# Patient Record
Sex: FEMALE | Race: WHITE | NOT HISPANIC OR LATINO | Employment: OTHER | URBAN - METROPOLITAN AREA
[De-identification: names, ages, dates, MRNs, and addresses within clinical notes are randomized per-mention and may not be internally consistent; named-entity substitution may affect disease eponyms.]

---

## 2020-07-13 ENCOUNTER — OFFICE VISIT CONVERTED (OUTPATIENT)
Dept: INTERNAL MEDICINE | Facility: CLINIC | Age: 60
End: 2020-07-13
Attending: INTERNAL MEDICINE

## 2020-07-13 ENCOUNTER — HOSPITAL ENCOUNTER (OUTPATIENT)
Dept: OTHER | Facility: HOSPITAL | Age: 60
Discharge: HOME | End: 2020-07-13
Attending: INTERNAL MEDICINE

## 2020-07-13 LAB
ALBUMIN SERPL-MCNC: 4.5 G/DL (ref 3.5–5)
ALBUMIN/GLOB SERPL: 1.5 {RATIO} (ref 1.4–2.6)
ALP SERPL-CCNC: 96 U/L (ref 43–160)
ALT SERPL-CCNC: 27 U/L (ref 10–40)
ANION GAP SERPL CALC-SCNC: 15 MMOL/L (ref 8–19)
AST SERPL-CCNC: 21 U/L (ref 15–50)
BASOPHILS # BLD AUTO: 0.05 10*3/UL (ref 0–0.2)
BASOPHILS NFR BLD AUTO: 0.5 % (ref 0–3)
BILIRUB SERPL-MCNC: 0.28 MG/DL (ref 0.2–1.3)
BUN SERPL-MCNC: 24 MG/DL (ref 5–25)
BUN/CREAT SERPL: 26 {RATIO} (ref 6–20)
CALCIUM SERPL-MCNC: 9.9 MG/DL (ref 8.7–10.4)
CHLORIDE SERPL-SCNC: 104 MMOL/L (ref 99–111)
CHOLEST SERPL-MCNC: 228 MG/DL (ref 107–200)
CHOLEST/HDLC SERPL: 5 {RATIO} (ref 3–6)
CONV ABS IMM GRAN: 0.03 10*3/UL (ref 0–0.2)
CONV CO2: 26 MMOL/L (ref 22–32)
CONV CREATININE URINE, RANDOM: 111 MG/DL (ref 10–300)
CONV IMMATURE GRAN: 0.3 % (ref 0–1.8)
CONV MICROALBUM.,U,RANDOM: <12 MG/L (ref 0–20)
CONV TOTAL PROTEIN: 7.5 G/DL (ref 6.3–8.2)
CREAT UR-MCNC: 0.94 MG/DL (ref 0.5–0.9)
DEPRECATED RDW RBC AUTO: 47.4 FL (ref 36.4–46.3)
EOSINOPHIL # BLD AUTO: 0.28 10*3/UL (ref 0–0.7)
EOSINOPHIL # BLD AUTO: 2.6 % (ref 0–7)
ERYTHROCYTE [DISTWIDTH] IN BLOOD BY AUTOMATED COUNT: 13.2 % (ref 11.7–14.4)
EST. AVERAGE GLUCOSE BLD GHB EST-MCNC: 140 MG/DL
GFR SERPLBLD BASED ON 1.73 SQ M-ARVRAT: >60 ML/MIN/{1.73_M2}
GLOBULIN UR ELPH-MCNC: 3 G/DL (ref 2–3.5)
GLUCOSE SERPL-MCNC: 129 MG/DL (ref 65–99)
HBA1C MFR BLD: 6.5 % (ref 3.5–5.7)
HCT VFR BLD AUTO: 46.4 % (ref 37–47)
HDLC SERPL-MCNC: 46 MG/DL (ref 40–60)
HGB BLD-MCNC: 14.9 G/DL (ref 12–16)
LDLC SERPL CALC-MCNC: 127 MG/DL (ref 70–100)
LYMPHOCYTES # BLD AUTO: 3.98 10*3/UL (ref 1–5)
LYMPHOCYTES NFR BLD AUTO: 37.1 % (ref 20–45)
MCH RBC QN AUTO: 31.2 PG (ref 27–31)
MCHC RBC AUTO-ENTMCNC: 32.1 G/DL (ref 33–37)
MCV RBC AUTO: 97.3 FL (ref 81–99)
MICROALBUMIN/CREAT UR: 10.8 MG/G{CRE} (ref 0–35)
MONOCYTES # BLD AUTO: 0.6 10*3/UL (ref 0.2–1.2)
MONOCYTES NFR BLD AUTO: 5.6 % (ref 3–10)
NEUTROPHILS # BLD AUTO: 5.79 10*3/UL (ref 2–8)
NEUTROPHILS NFR BLD AUTO: 53.9 % (ref 30–85)
NRBC CBCN: 0 % (ref 0–0.7)
OSMOLALITY SERPL CALC.SUM OF ELEC: 296 MOSM/KG (ref 273–304)
PLATELET # BLD AUTO: 297 10*3/UL (ref 130–400)
PMV BLD AUTO: 10.6 FL (ref 9.4–12.3)
POTASSIUM SERPL-SCNC: 5 MMOL/L (ref 3.5–5.3)
RBC # BLD AUTO: 4.77 10*6/UL (ref 4.2–5.4)
SODIUM SERPL-SCNC: 140 MMOL/L (ref 135–147)
TRIGL SERPL-MCNC: 276 MG/DL (ref 40–150)
TSH SERPL-ACNC: 1.47 M[IU]/L (ref 0.27–4.2)
VLDLC SERPL-MCNC: 55 MG/DL (ref 5–37)
WBC # BLD AUTO: 10.73 10*3/UL (ref 4.8–10.8)

## 2020-07-27 ENCOUNTER — OFFICE VISIT CONVERTED (OUTPATIENT)
Dept: INTERNAL MEDICINE | Facility: CLINIC | Age: 60
End: 2020-07-27
Attending: INTERNAL MEDICINE

## 2020-07-29 ENCOUNTER — OFFICE VISIT CONVERTED (OUTPATIENT)
Dept: INTERNAL MEDICINE | Facility: CLINIC | Age: 60
End: 2020-07-29
Attending: INTERNAL MEDICINE

## 2020-08-03 ENCOUNTER — OFFICE VISIT CONVERTED (OUTPATIENT)
Dept: INTERNAL MEDICINE | Facility: CLINIC | Age: 60
End: 2020-08-03
Attending: INTERNAL MEDICINE

## 2020-10-14 ENCOUNTER — OFFICE VISIT CONVERTED (OUTPATIENT)
Dept: INTERNAL MEDICINE | Facility: CLINIC | Age: 60
End: 2020-10-14
Attending: PHYSICIAN ASSISTANT

## 2020-10-15 ENCOUNTER — HOSPITAL ENCOUNTER (OUTPATIENT)
Dept: OTHER | Facility: HOSPITAL | Age: 60
Discharge: HOME | End: 2020-10-15
Attending: PHYSICIAN ASSISTANT

## 2020-10-15 LAB
ALBUMIN SERPL-MCNC: 4.3 G/DL (ref 3.5–5)
ALBUMIN/GLOB SERPL: 1.8 {RATIO} (ref 1.4–2.6)
ALP SERPL-CCNC: 88 U/L (ref 43–160)
ALT SERPL-CCNC: 35 U/L (ref 10–40)
ANION GAP SERPL CALC-SCNC: 19 MMOL/L (ref 8–19)
AST SERPL-CCNC: 27 U/L (ref 15–50)
BASOPHILS # BLD AUTO: 0.05 10*3/UL (ref 0–0.2)
BASOPHILS NFR BLD AUTO: 0.5 % (ref 0–3)
BILIRUB SERPL-MCNC: 0.31 MG/DL (ref 0.2–1.3)
BUN SERPL-MCNC: 21 MG/DL (ref 5–25)
BUN/CREAT SERPL: 23 {RATIO} (ref 6–20)
CALCIUM SERPL-MCNC: 9.3 MG/DL (ref 8.7–10.4)
CHLORIDE SERPL-SCNC: 105 MMOL/L (ref 99–111)
CHOLEST SERPL-MCNC: 201 MG/DL (ref 107–200)
CHOLEST/HDLC SERPL: 4.7 {RATIO} (ref 3–6)
CONV ABS IMM GRAN: 0.03 10*3/UL (ref 0–0.2)
CONV CO2: 20 MMOL/L (ref 22–32)
CONV IMMATURE GRAN: 0.3 % (ref 0–1.8)
CONV TOTAL PROTEIN: 6.7 G/DL (ref 6.3–8.2)
CREAT UR-MCNC: 0.92 MG/DL (ref 0.5–0.9)
DEPRECATED RDW RBC AUTO: 43.6 FL (ref 36.4–46.3)
EOSINOPHIL # BLD AUTO: 0.23 10*3/UL (ref 0–0.7)
EOSINOPHIL # BLD AUTO: 2.4 % (ref 0–7)
ERYTHROCYTE [DISTWIDTH] IN BLOOD BY AUTOMATED COUNT: 12.5 % (ref 11.7–14.4)
EST. AVERAGE GLUCOSE BLD GHB EST-MCNC: 140 MG/DL
GFR SERPLBLD BASED ON 1.73 SQ M-ARVRAT: >60 ML/MIN/{1.73_M2}
GLOBULIN UR ELPH-MCNC: 2.4 G/DL (ref 2–3.5)
GLUCOSE SERPL-MCNC: 132 MG/DL (ref 65–99)
HBA1C MFR BLD: 6.5 % (ref 3.5–5.7)
HCT VFR BLD AUTO: 44.5 % (ref 37–47)
HDLC SERPL-MCNC: 43 MG/DL (ref 40–60)
HGB BLD-MCNC: 14.9 G/DL (ref 12–16)
LDLC SERPL CALC-MCNC: 115 MG/DL (ref 70–100)
LYMPHOCYTES # BLD AUTO: 3.25 10*3/UL (ref 1–5)
LYMPHOCYTES NFR BLD AUTO: 33.5 % (ref 20–45)
MCH RBC QN AUTO: 31.4 PG (ref 27–31)
MCHC RBC AUTO-ENTMCNC: 33.5 G/DL (ref 33–37)
MCV RBC AUTO: 93.9 FL (ref 81–99)
MONOCYTES # BLD AUTO: 0.59 10*3/UL (ref 0.2–1.2)
MONOCYTES NFR BLD AUTO: 6.1 % (ref 3–10)
NEUTROPHILS # BLD AUTO: 5.54 10*3/UL (ref 2–8)
NEUTROPHILS NFR BLD AUTO: 57.2 % (ref 30–85)
NRBC CBCN: 0 % (ref 0–0.7)
OSMOLALITY SERPL CALC.SUM OF ELEC: 293 MOSM/KG (ref 273–304)
PLATELET # BLD AUTO: 264 10*3/UL (ref 130–400)
PMV BLD AUTO: 10.7 FL (ref 9.4–12.3)
POTASSIUM SERPL-SCNC: 4.5 MMOL/L (ref 3.5–5.3)
RBC # BLD AUTO: 4.74 10*6/UL (ref 4.2–5.4)
SODIUM SERPL-SCNC: 139 MMOL/L (ref 135–147)
TRIGL SERPL-MCNC: 216 MG/DL (ref 40–150)
TSH SERPL-ACNC: 2.12 M[IU]/L (ref 0.27–4.2)
VLDLC SERPL-MCNC: 43 MG/DL (ref 5–37)
WBC # BLD AUTO: 9.69 10*3/UL (ref 4.8–10.8)

## 2021-01-01 ENCOUNTER — OFFICE VISIT (OUTPATIENT)
Dept: INTERNAL MEDICINE | Facility: CLINIC | Age: 61
End: 2021-01-01

## 2021-01-01 ENCOUNTER — TELEPHONE (OUTPATIENT)
Dept: INTERNAL MEDICINE | Facility: CLINIC | Age: 61
End: 2021-01-01

## 2021-01-01 ENCOUNTER — PROCEDURE VISIT (OUTPATIENT)
Dept: INTERNAL MEDICINE | Facility: CLINIC | Age: 61
End: 2021-01-01

## 2021-01-01 VITALS
BODY MASS INDEX: 35.42 KG/M2 | HEIGHT: 66 IN | SYSTOLIC BLOOD PRESSURE: 108 MMHG | DIASTOLIC BLOOD PRESSURE: 74 MMHG | TEMPERATURE: 97.6 F | WEIGHT: 220.4 LBS | RESPIRATION RATE: 22 BRPM | HEART RATE: 72 BPM | OXYGEN SATURATION: 96 %

## 2021-01-01 VITALS
SYSTOLIC BLOOD PRESSURE: 130 MMHG | TEMPERATURE: 97.4 F | WEIGHT: 216.8 LBS | HEART RATE: 89 BPM | DIASTOLIC BLOOD PRESSURE: 74 MMHG | HEIGHT: 63 IN | OXYGEN SATURATION: 95 % | BODY MASS INDEX: 38.41 KG/M2

## 2021-01-01 DIAGNOSIS — E11.9 TYPE 2 DIABETES MELLITUS WITHOUT COMPLICATION, WITHOUT LONG-TERM CURRENT USE OF INSULIN (HCC): Primary | ICD-10-CM

## 2021-01-01 DIAGNOSIS — L72.9 CYST OF SKIN: Primary | ICD-10-CM

## 2021-01-01 DIAGNOSIS — M54.9 BACK PAIN, UNSPECIFIED BACK LOCATION, UNSPECIFIED BACK PAIN LATERALITY, UNSPECIFIED CHRONICITY: ICD-10-CM

## 2021-01-01 DIAGNOSIS — E78.5 HYPERLIPIDEMIA, UNSPECIFIED HYPERLIPIDEMIA TYPE: ICD-10-CM

## 2021-01-01 DIAGNOSIS — I10 ESSENTIAL HYPERTENSION: ICD-10-CM

## 2021-01-01 LAB
ALBUMIN SERPL-MCNC: 4.5 G/DL (ref 3.5–5.2)
ALBUMIN UR-MCNC: <1.2 MG/DL
ALBUMIN/GLOB SERPL: 1.5 G/DL
ALP SERPL-CCNC: 95 U/L (ref 39–117)
ALT SERPL W P-5'-P-CCNC: 33 U/L (ref 1–33)
ANION GAP SERPL CALCULATED.3IONS-SCNC: 12.2 MMOL/L (ref 5–15)
AST SERPL-CCNC: 36 U/L (ref 1–32)
BASOPHILS # BLD AUTO: 0.05 10*3/MM3 (ref 0–0.2)
BASOPHILS NFR BLD AUTO: 0.4 % (ref 0–1.5)
BILIRUB SERPL-MCNC: 0.4 MG/DL (ref 0–1.2)
BUN SERPL-MCNC: 20 MG/DL (ref 8–23)
BUN/CREAT SERPL: 22 (ref 7–25)
CALCIUM SPEC-SCNC: 9.6 MG/DL (ref 8.6–10.5)
CHLORIDE SERPL-SCNC: 102 MMOL/L (ref 98–107)
CHOLEST SERPL-MCNC: 224 MG/DL (ref 0–200)
CO2 SERPL-SCNC: 24.8 MMOL/L (ref 22–29)
CREAT SERPL-MCNC: 0.91 MG/DL (ref 0.57–1)
CREAT UR-MCNC: 142.9 MG/DL
DEPRECATED RDW RBC AUTO: 42.3 FL (ref 37–54)
EOSINOPHIL # BLD AUTO: 0.24 10*3/MM3 (ref 0–0.4)
EOSINOPHIL NFR BLD AUTO: 2 % (ref 0.3–6.2)
ERYTHROCYTE [DISTWIDTH] IN BLOOD BY AUTOMATED COUNT: 12.1 % (ref 12.3–15.4)
GFR SERPL CREATININE-BSD FRML MDRD: 62 ML/MIN/1.73
GLOBULIN UR ELPH-MCNC: 3 GM/DL
GLUCOSE SERPL-MCNC: 123 MG/DL (ref 65–99)
HBA1C MFR BLD: 7.74 % (ref 4.8–5.6)
HCT VFR BLD AUTO: 44.5 % (ref 34–46.6)
HDLC SERPL-MCNC: 44 MG/DL (ref 40–60)
HGB BLD-MCNC: 14.9 G/DL (ref 12–15.9)
IMM GRANULOCYTES # BLD AUTO: 0.05 10*3/MM3 (ref 0–0.05)
IMM GRANULOCYTES NFR BLD AUTO: 0.4 % (ref 0–0.5)
LDLC SERPL CALC-MCNC: 140 MG/DL (ref 0–100)
LDLC/HDLC SERPL: 3.08 {RATIO}
LYMPHOCYTES # BLD AUTO: 3.8 10*3/MM3 (ref 0.7–3.1)
LYMPHOCYTES NFR BLD AUTO: 31.7 % (ref 19.6–45.3)
MCH RBC QN AUTO: 31.5 PG (ref 26.6–33)
MCHC RBC AUTO-ENTMCNC: 33.5 G/DL (ref 31.5–35.7)
MCV RBC AUTO: 94.1 FL (ref 79–97)
MICROALBUMIN/CREAT UR: NORMAL MG/G{CREAT}
MONOCYTES # BLD AUTO: 0.67 10*3/MM3 (ref 0.1–0.9)
MONOCYTES NFR BLD AUTO: 5.6 % (ref 5–12)
NEUTROPHILS NFR BLD AUTO: 59.9 % (ref 42.7–76)
NEUTROPHILS NFR BLD AUTO: 7.18 10*3/MM3 (ref 1.7–7)
NRBC BLD AUTO-RTO: 0 /100 WBC (ref 0–0.2)
PLATELET # BLD AUTO: 280 10*3/MM3 (ref 140–450)
PMV BLD AUTO: 10.8 FL (ref 6–12)
POTASSIUM SERPL-SCNC: 4.5 MMOL/L (ref 3.5–5.2)
PROT SERPL-MCNC: 7.5 G/DL (ref 6–8.5)
RBC # BLD AUTO: 4.73 10*6/MM3 (ref 3.77–5.28)
SODIUM SERPL-SCNC: 139 MMOL/L (ref 136–145)
TRIGL SERPL-MCNC: 222 MG/DL (ref 0–150)
VLDLC SERPL-MCNC: 40 MG/DL (ref 5–40)
WBC # BLD AUTO: 11.99 10*3/MM3 (ref 3.4–10.8)

## 2021-01-01 PROCEDURE — 82043 UR ALBUMIN QUANTITATIVE: CPT | Performed by: INTERNAL MEDICINE

## 2021-01-01 PROCEDURE — 99214 OFFICE O/P EST MOD 30 MIN: CPT | Performed by: INTERNAL MEDICINE

## 2021-01-01 PROCEDURE — 83036 HEMOGLOBIN GLYCOSYLATED A1C: CPT | Performed by: INTERNAL MEDICINE

## 2021-01-01 PROCEDURE — 82570 ASSAY OF URINE CREATININE: CPT | Performed by: INTERNAL MEDICINE

## 2021-01-01 PROCEDURE — 80053 COMPREHEN METABOLIC PANEL: CPT | Performed by: INTERNAL MEDICINE

## 2021-01-01 PROCEDURE — 85025 COMPLETE CBC W/AUTO DIFF WBC: CPT | Performed by: INTERNAL MEDICINE

## 2021-01-01 PROCEDURE — 80061 LIPID PANEL: CPT | Performed by: INTERNAL MEDICINE

## 2021-01-01 PROCEDURE — 17110 DESTRUCTION B9 LES UP TO 14: CPT | Performed by: INTERNAL MEDICINE

## 2021-01-01 PROCEDURE — 36415 COLL VENOUS BLD VENIPUNCTURE: CPT | Performed by: INTERNAL MEDICINE

## 2021-01-01 RX ORDER — AMLODIPINE BESYLATE 10 MG/1
10 TABLET ORAL DAILY
Qty: 30 TABLET | Refills: 5 | Status: SHIPPED | OUTPATIENT
Start: 2021-01-01 | End: 2022-01-01 | Stop reason: SDUPTHER

## 2021-01-01 RX ORDER — LISINOPRIL 40 MG/1
40 TABLET ORAL DAILY
Qty: 30 TABLET | Refills: 5 | Status: SHIPPED | OUTPATIENT
Start: 2021-01-01 | End: 2022-01-01 | Stop reason: SDUPTHER

## 2021-01-01 RX ORDER — CYCLOBENZAPRINE HCL 10 MG
10 TABLET ORAL 3 TIMES DAILY PRN
Qty: 90 TABLET | Refills: 0 | Status: SHIPPED | OUTPATIENT
Start: 2021-01-01 | End: 2022-01-01 | Stop reason: SDUPTHER

## 2021-01-01 RX ORDER — METHYLPREDNISOLONE ACETATE 80 MG/ML
80 INJECTION, SUSPENSION INTRA-ARTICULAR; INTRALESIONAL; INTRAMUSCULAR; SOFT TISSUE ONCE
Status: DISCONTINUED | OUTPATIENT
Start: 2021-01-01 | End: 2022-01-01

## 2021-01-01 RX ORDER — ATENOLOL 25 MG/1
25 TABLET ORAL DAILY
Qty: 30 TABLET | Refills: 5 | Status: SHIPPED | OUTPATIENT
Start: 2021-01-01 | End: 2022-01-01 | Stop reason: SDUPTHER

## 2021-01-15 ENCOUNTER — OFFICE VISIT CONVERTED (OUTPATIENT)
Dept: INTERNAL MEDICINE | Facility: CLINIC | Age: 61
End: 2021-01-15
Attending: INTERNAL MEDICINE

## 2021-01-15 ENCOUNTER — HOSPITAL ENCOUNTER (OUTPATIENT)
Dept: OTHER | Facility: HOSPITAL | Age: 61
Discharge: HOME | End: 2021-01-15
Attending: INTERNAL MEDICINE

## 2021-01-15 LAB
ALBUMIN SERPL-MCNC: 4.6 G/DL (ref 3.5–5)
ALBUMIN/GLOB SERPL: 1.5 {RATIO} (ref 1.4–2.6)
ALP SERPL-CCNC: 102 U/L (ref 43–160)
ALT SERPL-CCNC: 38 U/L (ref 10–40)
ANION GAP SERPL CALC-SCNC: 18 MMOL/L (ref 8–19)
AST SERPL-CCNC: 29 U/L (ref 15–50)
BASOPHILS # BLD AUTO: 0.07 10*3/UL (ref 0–0.2)
BASOPHILS NFR BLD AUTO: 0.6 % (ref 0–3)
BILIRUB SERPL-MCNC: 0.27 MG/DL (ref 0.2–1.3)
BUN SERPL-MCNC: 20 MG/DL (ref 5–25)
BUN/CREAT SERPL: 21 {RATIO} (ref 6–20)
CALCIUM SERPL-MCNC: 10.4 MG/DL (ref 8.7–10.4)
CHLORIDE SERPL-SCNC: 100 MMOL/L (ref 99–111)
CHOLEST SERPL-MCNC: 242 MG/DL (ref 107–200)
CHOLEST/HDLC SERPL: 5.3 {RATIO} (ref 3–6)
CONV ABS IMM GRAN: 0.05 10*3/UL (ref 0–0.2)
CONV CO2: 23 MMOL/L (ref 22–32)
CONV IMMATURE GRAN: 0.4 % (ref 0–1.8)
CONV TOTAL PROTEIN: 7.7 G/DL (ref 6.3–8.2)
CREAT UR-MCNC: 0.95 MG/DL (ref 0.5–0.9)
DEPRECATED RDW RBC AUTO: 43.6 FL (ref 36.4–46.3)
EOSINOPHIL # BLD AUTO: 0.28 10*3/UL (ref 0–0.7)
EOSINOPHIL # BLD AUTO: 2.3 % (ref 0–7)
ERYTHROCYTE [DISTWIDTH] IN BLOOD BY AUTOMATED COUNT: 12.6 % (ref 11.7–14.4)
EST. AVERAGE GLUCOSE BLD GHB EST-MCNC: 151 MG/DL
GFR SERPLBLD BASED ON 1.73 SQ M-ARVRAT: >60 ML/MIN/{1.73_M2}
GLOBULIN UR ELPH-MCNC: 3.1 G/DL (ref 2–3.5)
GLUCOSE SERPL-MCNC: 105 MG/DL (ref 65–99)
HBA1C MFR BLD: 6.9 % (ref 3.5–5.7)
HCT VFR BLD AUTO: 45.8 % (ref 37–47)
HDLC SERPL-MCNC: 46 MG/DL (ref 40–60)
HGB BLD-MCNC: 15.2 G/DL (ref 12–16)
LDLC SERPL CALC-MCNC: 133 MG/DL (ref 70–100)
LYMPHOCYTES # BLD AUTO: 4.06 10*3/UL (ref 1–5)
LYMPHOCYTES NFR BLD AUTO: 33.9 % (ref 20–45)
MCH RBC QN AUTO: 30.8 PG (ref 27–31)
MCHC RBC AUTO-ENTMCNC: 33.2 G/DL (ref 33–37)
MCV RBC AUTO: 92.9 FL (ref 81–99)
MONOCYTES # BLD AUTO: 0.85 10*3/UL (ref 0.2–1.2)
MONOCYTES NFR BLD AUTO: 7.1 % (ref 3–10)
NEUTROPHILS # BLD AUTO: 6.67 10*3/UL (ref 2–8)
NEUTROPHILS NFR BLD AUTO: 55.7 % (ref 30–85)
NRBC CBCN: 0 % (ref 0–0.7)
OSMOLALITY SERPL CALC.SUM OF ELEC: 285 MOSM/KG (ref 273–304)
PLATELET # BLD AUTO: 279 10*3/UL (ref 130–400)
PMV BLD AUTO: 10.9 FL (ref 9.4–12.3)
POTASSIUM SERPL-SCNC: 4.7 MMOL/L (ref 3.5–5.3)
RBC # BLD AUTO: 4.93 10*6/UL (ref 4.2–5.4)
SODIUM SERPL-SCNC: 136 MMOL/L (ref 135–147)
TRIGL SERPL-MCNC: 313 MG/DL (ref 40–150)
TSH SERPL-ACNC: 2.49 M[IU]/L (ref 0.27–4.2)
VLDLC SERPL-MCNC: 63 MG/DL (ref 5–37)
WBC # BLD AUTO: 11.98 10*3/UL (ref 4.8–10.8)

## 2021-01-18 LAB
CONV HEPATITIS C AB WITH REFLEX TO CONFIRMATION: 0.1 S/CO RATIO (ref 0–0.9)
CONV HEPATITIS COMMENT: NORMAL

## 2021-05-10 NOTE — H&P
History and Physical      Patient Name: Karen Goldstein   Patient ID: [id number]   Sex: Female   YOB: 1960    Referring Provider: Luiza BRITO    Visit Date: July 13, 2020    Provider: Lakshmi Gibson MD   Location: [facility]   Location Address: [address] [place] [address], [address]  [place], [place]  [id number]   Location Phone: [phone]          Chief Complaint  · est care      History Of Present Illness  Karen Goldstein is a 60 year old /White female who presents for evaluation and treatment of:      chronic back pain, has seen pain management, was prescribed percocet, was too expensive, asked for oxycodone but they would not prescribe this for her so does not go anymore, taking Flexeril at bedtime as needed. States that she does smoke marijuana sometimes which helps with her pain.     Shoulder pain, MRI right shoulder scanned into chart    sebaceous cyst on back, has had one removed from chest, has another one starting on right leg. Would like to have one on back removed.     DM: newly diagnosed, on metformin 500mg daily, trying to limit bread    HTN: well controlled today    HLD: on statin    needs mammogram scheduled    needs colon cancer screening           Past Medical History  Disease Name Date Onset Notes   Arthritis --  --    Cervicalgia --  --    Cervicalgia 03/07/2017 --    DM2 (diabetes mellitus, type 2) --  --    Essential hypertension --  --    Hyperlipidemia --  --    Hypertension --  --    Lumbago/low back pain 03/07/2017 --    Paresthesia 03/07/2017 --    Sciatica 03/07/2017 --          Past Surgical History  Procedure Name Date Notes   Back --  --    Discectomy --  --    Tonsilectomy --  --    Tonsillectomy --  --          Medication List  Name Date Started Instructions   amlodipine 10 mg oral tablet  take 1 tablet (10 mg) by oral route once daily   atenolol 25 mg oral tablet  take 1 tablet (25 mg) by oral route once daily   cyclobenzaprine 10 mg oral tablet   take 1 tablet (10 mg) by oral route 3 times per day   gabapentin 300 mg oral capsule  take 1 capsule (300 mg) by oral route 3 times per day   lisinopril 40 mg oral tablet  take 1 tablet (40 mg) by oral route once daily   oxycodone-acetaminophen  mg oral tablet  take 1 tablet by oral route 2 times a day         Allergy List  Allergen Name Date Reaction Notes   Codeine Sulfate --  --  --    ibuprofen --  --  --          Family Medical History  Disease Name Relative/Age Notes   Spine Problems Father/  Sister/   --    Family history of certain chronic disabling diseases; arthritis Father/  Sister/   Father; Sister         Social History  Finding Status Start/Stop Quantity Notes   Alcohol Use Current some day --/-- --  occasionally drinks, less than 1 drink per day, has been drinking for 21-30 years   lives with other --  --/-- --  --    Recreational Drug Use Never --/-- --  no   Retired. --  --/-- --  --    Single. --  --/-- --  --    Tobacco Current every day --/-- 1 PPD current every day smoker, 1 packs per day, smoked 11-20 years         Review of Systems  · Constitutional  o Denies  o : fever, fatigue, weight loss, weight gain  · Eyes  o Denies  o : discharge from eye, blurred vision  · HENT  o Denies  o : headaches, nasal congestion  · Cardiovascular  o Denies  o : lower extremity edema, claudication, chest pressure, palpitations  · Respiratory  o Denies  o : shortness of breath, wheezing, frequent cough, hemoptysis, dyspnea on exertion  · Gastrointestinal  o Denies  o : nausea, vomiting, diarrhea, constipation, abdominal pain  · Integument  o Admits  o : new skin lesions  o Denies  o : rash  · Neurologic  o Denies  o : tingling or numbness, memory problems  · Musculoskeletal  o Admits  o : back pain, shoulder pain  · Psychiatric  o Denies  o : anxiety, depression      Vitals  Date Time BP Position Site L\R Cuff Size HR RR TEMP (F) WT  HT  BMI kg/m2 BSA m2 O2 Sat HC       03/07/2017 09:42 /87 Sitting     "   213lbs 16oz 5'  6\" 34.54 2.13     03/28/2017 10:58 AM      80 - R   200lbs 0oz 5'  6\" 32.28 2.06 97 %    07/13/2020 10:23 /74 Sitting    83 - R  97.5 207lbs 2oz 5'  6\" 33.43 2.09 98 %          Physical Examination  · Constitutional  o Appearance  o : no acute distress, well-nourished  · Head and Face  o Head  o :   § Inspection  § : atraumatic, normocephalic  · Eyes  o Eyes  o : extraocular movements intact, no scleral icterus, no conjunctival injection  · Ears, Nose, Mouth and Throat  o Ears  o :   § External Ears  § : normal  § Otoscopic Examination  § : tympanic membrane appearance within normal limits bilaterally  o Nose  o :   § Intranasal Exam  § : nares patent  o Oral Cavity  o :   § Oral Mucosa  § : moist mucous membranes  o Throat  o :   § Oropharynx  § : no inflammation or lesions present, tonsils within normal limits  · Neck  o Thyroid  o : gland size normal, nontender, no nodules or masses present on palpation, symmetric  · Respiratory  o Respiratory Effort  o : breathing comfortably, symmetric chest rise  o Auscultation of Lungs  o : clear to asculatation bilaterally, no wheezes, rales, or rhonchii  · Cardiovascular  o Heart  o :   § Auscultation of Heart  § : regular rate and rhythm, no murmurs, rubs, or gallops  o Peripheral Vascular System  o :   § Extremities  § : no edema  · Gastrointestinal  o Abdominal Examination  o :   § Abdomen  § : bowel sounds present, non-distended, non-tender  · Lymphatic  o Neck  o : no lymphadenopathy present  · Skin and Subcutaneous Tissue  o General Inspection  o : no lesions present, no areas of discoloration, skin turgor normal  · Neurologic  o Mental Status Examination  o :   § Orientation  § : grossly oriented to person, place and time  o Gait and Station  o :   § Gait Screening  § : normal gait  · Psychiatric  o General  o : normal mood and affect          Assessment  · Lumbago/low back pain     724.3/M54.40  · Screening for " depression     V79.0/Z13.89  · Screening for colon cancer     V76.51/Z12.11  · Visit for screening mammogram     V76.12/Z12.31  · Diabetes mellitus, type 2     250.00/E11.9  labs today  continue metformin  continue diet control  · Essential hypertension     401.9/I10  well controlled  · Hyperlipidemia     272.4/E78.5  labs today  continue statin  · Sebaceous cyst     706.2/L72.3  will schedule procedure visit for removal      Plan  · Orders  o ACO-18: Negative screen for clinical depression using a standardized tool () - V79.0/Z13.89 - 07/13/2020  o Cologuard (83271) - V76.51/Z12.11 - 07/13/2020  o Screening Mammography; Bilateral 2D (, 30784) - V76.12/Z12.31 - 07/13/2020  o Diabetes 2 Panel (Urine Microalbumin, CMP, Lipid, A1c, ) [facility] (09585, 02647, 19969, 51816) - 250.00/E11.9, 272.4/E78.5 - 07/13/2020  o CBC with Auto Diff [facility] (67802) - 250.00/E11.9 - 07/13/2020  o TSH [facility] (06536) - 250.00/E11.9 - 07/13/2020  o ACO-17: Screened for tobacco use AND received tobacco cessation intervention (4004F) - - 07/13/2020  o ACO-39: Current medications updated and reviewed () - - 07/13/2020  · Instructions  o Depression Screen completed and scanned into the EMR under the designated folder within the patient's documents.  o Today's PHQ-9 result is _3__  o Continue blood sugar monitoring daily and record. Bring your log to office visits. Call the office for readings below 70 and above 250 or any complications.  o Daily foot care. Avoid walking barefoot. Annual Dilated Eye Exam.  o Discussed with patient blood pressure monitoring, hemoglobin A1C levels need to be below 7.0, and LDL (Lipid) goals below 70.  o Patient advised to monitor blood pressure (B/P) at home and journal readings. Patient informed that a B/P reading at home of more than 130/80 is considered hypertension. For readings greater lyof677/90 or higher patient is advised to follow up in the office with readings for management. Patient advised to limit  sodium intake.  o Advised that cheeses and other sources of dairy fats, animal fats, fast food, and the extras (candy, pastries, pies, doughnuts and cookies) all contain LDL raising nutrients. Advised to increase fruits, vegetables, whole grains, and to monitor portion sizes.   o Take all medications as prescribed/directed.  o Patient was educated/instructed on their diagnosis, treatment and medications prior to discharge from the clinic today.  o Call the office with any concerns or questions.  · Disposition  o Follow up in 3 months  o Labs drawn in clinic  o Radiology Order to be scheduled  o Needs Procedure Visit (40 minutes)            Electronically Signed by: Lakshmi Gibson MD -Author on July 13, 2020 10:50:33 AM

## 2021-05-13 NOTE — PROGRESS NOTES
Progress Note      Patient Name: Karen Goldstein   Patient ID: [id number]   Sex: Female   YOB: 1960    Primary Care Provider: Lakshmi Gibson MD   Referring Provider: Lakshmi Gibson MD    Visit Date: July 29, 2020    Provider: Lakshmi Gibosn MD   Location: [facility]   Location Address: [address] [place] [address]  [id number]   Location Phone: [phone]          Chief Complaint  · dressing change      History Of Present Illness  Karen Goldstein is a 60 year old /White female who presents for evaluation and treatment of:      Patient presents for follow up after removal of sebaceous cyst from lower back.     Packing in place       Past Medical History  Disease Name Date Onset Notes   Arthritis --  --    Cervical cancer screening 3/2020 --    Cervicalgia --  --    Cervicalgia 03/07/2017 --    DM2 (diabetes mellitus, type 2) --  --    Essential hypertension --  --    Hyperlipidemia --  --    Hypertension --  --    Lumbago/low back pain 03/07/2017 --    Paresthesia 03/07/2017 --    Sciatica 03/07/2017 --    Screening for breast cancer 2016 --    Screening for colon cancer --  --          Past Surgical History  Procedure Name Date Notes   Back --  --    Discectomy --  --    Tonsilectomy --  --    Tonsillectomy --  --          Medication List  Name Date Started Instructions   amlodipine 10 mg oral tablet  take 1 tablet (10 mg) by oral route once daily   apple cider vinegar oral  --    atenolol 25 mg oral tablet  take 1 tablet (25 mg) by oral route once daily   biotin 10,000 mcg oral capsule  take 1 capsule by oral route 2 times a day   Co Q-10 200 mg oral capsule  take 1 capsule by oral route daily   cyclobenzaprine 10 mg oral tablet  take 1 tablet (10 mg) by oral route 3 times per day   Fish, Flax andBorage Oil(Prim) 400-400-200 mg oral capsule  take 1 capsule by oral route 2 times a day   gabapentin 300 mg oral capsule  take 1 capsule (300 mg) by oral route  "3 times per day   lisinopril 40 mg oral tablet  take 1 tablet (40 mg) by oral route once daily   Multi For Her 50 Plus 400-80 mcg oral capsule  take 1 capsule by oral route daily   oxycodone-acetaminophen  mg oral tablet  take 1 tablet by oral route 2 times a day   Vitamin C 1,000 mg oral tablet  take 1 tablet by oral route 2 times a day   Vitamin D3 125 mcg (5,000 unit) oral tablet  take 1 tablet by oral route 2 times a day   Vitamins B Complex oral capsule  take 1 capsule by oral route daily   zinc 50 mg oral tablet  take 1 tablet by oral route daily         Allergy List  Allergen Name Date Reaction Notes   Codeine Sulfate --  --  --    ibuprofen --  --  --          Family Medical History  Disease Name Relative/Age Notes   Spine Problems Father/  Sister/   --    Family history of certain chronic disabling diseases; arthritis Father/  Sister/   Father; Sister         Social History  Finding Status Start/Stop Quantity Notes   Alcohol Use Current some day --/-- --  occasionally drinks, less than 1 drink per day, has been drinking for 21-30 years   lives with other --  --/-- --  --    Recreational Drug Use Never --/-- --  no   Retired. --  --/-- --  --    Single. --  --/-- --  --    Tobacco Current every day --/-- 1 PPD current every day smoker, 1 packs per day, smoked 11-20 years         Review of Systems  · Constitutional  o Denies  o : fever, fatigue, weight loss, weight gain  · Cardiovascular  o Denies  o : lower extremity edema, claudication, chest pressure, palpitations  · Respiratory  o Denies  o : shortness of breath, wheezing, frequent cough, hemoptysis, dyspnea on exertion  · Gastrointestinal  o Denies  o : nausea, vomiting, diarrhea, constipation, abdominal pain      Vitals  Date Time BP Position Site L\R Cuff Size HR RR TEMP (F) WT  HT  BMI kg/m2 BSA m2 O2 Sat HC       07/13/2020 10:23 /74 Sitting    83 - R  97.5 207lbs 2oz 5'  6\" 33.43 2.09 98 %    07/27/2020 08:13 /82 Sitting    81 - R  " "97 211lbs 8oz 5'  6\" 34.14 2.11 97 %    07/29/2020 09:29 /76 Sitting    88 - R  97.6 211lbs 0oz 5'  6\" 34.06 2.11 95 %          Physical Examination  · Constitutional  o Appearance  o : no acute distress, well-nourished  · Head and Face  o Head  o :   § Inspection  § : atraumatic, normocephalic  · Eyes  o Eyes  o : extraocular movements intact, no scleral icterus, no conjunctival injection  · Ears, Nose, Mouth and Throat  o Ears  o :   § External Ears  § : normal  o Nose  o :   § Intranasal Exam  § : nares patent  o Oral Cavity  o :   § Oral Mucosa  § : moist mucous membranes  · Respiratory  o Respiratory Effort  o : breathing comfortably, symmetric chest rise  o Auscultation of Lungs  o : clear to asculatation bilaterally, no wheezes, rales, or rhonchii  · Cardiovascular  o Heart  o :   § Auscultation of Heart  § : regular rate and rhythm, no murmurs, rubs, or gallops  o Peripheral Vascular System  o :   § Extremities  § : no edema  · Skin and Subcutaneous Tissue  o General Inspection  o : incision healing well, packing in place and clean  · Neurologic  o Mental Status Examination  o :   § Orientation  § : grossly oriented to person, place and time  o Gait and Station  o :   § Gait Screening  § : normal gait  · Psychiatric  o General  o : normal mood and affect     Packing removed from wound. Wound flushed with sterile saline. Closure of incision completed with additional 2 simple interrupted sutures.           Assessment  · Sebaceous cyst     706.2/L72.3  packing removed and closure of incision completed  follow up in 5 days for removal of sutures    Problems Reconciled  Plan  · Orders  o ACO-39: Current medications updated and reviewed () - - 07/29/2020  · Medications  o Medications have been Reconciled  o Transition of Care or Provider Policy  · Instructions  o Patient was educated/instructed on their diagnosis, treatment and medications prior to discharge from the clinic today.  o Call the office " with any concerns or questions.            Electronically Signed by: Lakshmi Gibson MD -Author on August 3, 2020 09:39:46 AM

## 2021-05-13 NOTE — PROGRESS NOTES
"   Progress Note      Patient Name: Karen Goldstein   Patient ID: [id number]   Sex: Female   YOB: 1960    Primary Care Provider: Lakshmi Gibson MD   Referring Provider: Lakshmi Gibson MD    Visit Date: August 3, 2020    Provider: Lakshmi Gibson MD   Location: [facility]   Location Address: [address] [place] [address], [address]  [id number]   Location Phone: [phone]          Chief Complaint  · \"She is going to check my stitches.\"      History Of Present Illness  Karen Goldstein is a 60 year old /White female who presents for evaluation and treatment of:      follow up after procedure  removal of sutures    denies any complaints with the procedure site       Past Medical History  Disease Name Date Onset Notes   Arthritis --  --    Cervical cancer screening 3/2020 --    Cervicalgia --  --    Cervicalgia 03/07/2017 --    DM2 (diabetes mellitus, type 2) --  --    Essential hypertension --  --    Hyperlipidemia --  --    Hypertension --  --    Lumbago/low back pain 03/07/2017 --    Paresthesia 03/07/2017 --    Sciatica 03/07/2017 --    Screening for breast cancer 2016 --    Screening for colon cancer --  --          Past Surgical History  Procedure Name Date Notes   Back --  --    Discectomy --  --    Tonsilectomy --  --    Tonsillectomy --  --          Medication List  Name Date Started Instructions   amlodipine 10 mg oral tablet  take 1 tablet (10 mg) by oral route once daily   apple cider vinegar oral  --    atenolol 25 mg oral tablet  take 1 tablet (25 mg) by oral route once daily   biotin 10,000 mcg oral capsule  take 1 capsule by oral route 2 times a day   Co Q-10 200 mg oral capsule  take 1 capsule by oral route daily   cyclobenzaprine 10 mg oral tablet  take 1 tablet (10 mg) by oral route 3 times per day   Fish, Flax andBorage Oil(Prim) 400-400-200 mg oral capsule  take 1 capsule by oral route 2 times a day   gabapentin 300 mg oral capsule  take 1 capsule (300 " "mg) by oral route 3 times per day   lisinopril 40 mg oral tablet  take 1 tablet (40 mg) by oral route once daily   Multi For Her 50 Plus 400-80 mcg oral capsule  take 1 capsule by oral route daily   oxycodone-acetaminophen  mg oral tablet  take 1 tablet by oral route 2 times a day   Vitamin C 1,000 mg oral tablet  take 1 tablet by oral route 2 times a day   Vitamin D3 125 mcg (5,000 unit) oral tablet  take 1 tablet by oral route 2 times a day   Vitamins B Complex oral capsule  take 1 capsule by oral route daily   zinc 50 mg oral tablet  take 1 tablet by oral route daily         Allergy List  Allergen Name Date Reaction Notes   Codeine Sulfate --  --  --    ibuprofen --  --  --          Family Medical History  Disease Name Relative/Age Notes   Spine Problems Father/  Sister/   --    Family history of certain chronic disabling diseases; arthritis Father/  Sister/   Father; Sister         Social History  Finding Status Start/Stop Quantity Notes   Alcohol Use Current some day --/-- --  occasionally drinks, less than 1 drink per day, has been drinking for 21-30 years   lives with other --  --/-- --  --    Recreational Drug Use Never --/-- --  no   Retired. --  --/-- --  --    Single. --  --/-- --  --    Tobacco Current every day --/-- 1 PPD current every day smoker, 1 packs per day, smoked 11-20 years         Review of Systems  · Constitutional  o Denies  o : fever, fatigue, weight loss, weight gain  · Cardiovascular  o Denies  o : lower extremity edema, claudication, chest pressure, palpitations  · Respiratory  o Denies  o : shortness of breath, wheezing, frequent cough, hemoptysis, dyspnea on exertion  · Gastrointestinal  o Denies  o : nausea, vomiting, diarrhea, constipation, abdominal pain      Vitals  Date Time BP Position Site L\R Cuff Size HR RR TEMP (F) WT  HT  BMI kg/m2 BSA m2 O2 Sat HC       07/29/2020 09:29 /76 Sitting    88 - R  97.6 211lbs 0oz 5'  6\" 34.06 2.11 95 %    08/03/2020 08:44 /74 " "Sitting    80 - R  97.4 213lbs 2oz 5'  3\" 37.75 2.07 99 %    08/03/2020 08:44 AM         213lbs 2oz 5'  3\" 37.75 2.07           Physical Examination  · Constitutional  o Appearance  o : no acute distress, well-nourished  · Head and Face  o Head  o :   § Inspection  § : atraumatic, normocephalic  · Ears, Nose, Mouth and Throat  o Ears  o :   § External Ears  § : normal  o Nose  o :   § Intranasal Exam  § : nares patent  o Oral Cavity  o :   § Oral Mucosa  § : moist mucous membranes  · Respiratory  o Respiratory Effort  o : breathing comfortably, symmetric chest rise  o Auscultation of Lungs  o : clear to asculatation bilaterally, no wheezes, rales, or rhonchii  · Cardiovascular  o Heart  o :   § Auscultation of Heart  § : regular rate and rhythm, no murmurs, rubs, or gallops  o Peripheral Vascular System  o :   § Extremities  § : no edema  · Skin and Subcutaneous Tissue  o General Inspection  o : incision healing well, 6 simple interupted sutures in place  · Neurologic  o Mental Status Examination  o :   § Orientation  § : grossly oriented to person, place and time  o Gait and Station  o :   § Gait Screening  § : normal gait          Assessment  · Suture check     V58.49/Z48.89    Problems Reconciled  Plan  · Orders  o ACO-39: Current medications updated and reviewed () - - 08/03/2020  o Suture removal (53982, 55902) - V58.49/Z48.89 - 08/03/2020  · Medications  o Medications have been Reconciled  o Transition of Care or Provider Policy  · Instructions  o Patient was educated/instructed on their diagnosis, treatment and medications prior to discharge from the clinic today.  o Call the office with any concerns or questions.  · Disposition  o follow up as needed            Electronically Signed by: Lakshmi Gibson MD -Author on August 3, 2020 09:23:11 AM  "

## 2021-05-13 NOTE — PROGRESS NOTES
Progress Note      Patient Name: Karen Goldstein   Patient ID: [id number]   Sex: Female   YOB: 1960    Primary Care Provider: Lakshmi Gibson MD   Referring Provider: Lakshmi Gibson MD    Visit Date: October 14, 2020    Provider: Jeana Rivers PA-C   Location: [facility]   Location Address: [address] [place] [address], [facility]  [place], [place]  [id number]   Location Phone: [phone]          Chief Complaint  · Follow up  · Concerned about metformin recall and does not feel comfortable taking the medication any longer      History Of Present Illness  Karen Goldstein is a 60 year old /White female who presents for evaluation and treatment of:      DM: taking metformin 1x/day.  She does not check bg at home and never has  She had concerns about metformin due to recall.   She had eye exam in April and goes every April. She wear contacts daily .  She has numbness/tingling in bilateral feet on and off due to LBP.    Bp: denies cp, palpitations.    Nicotine: does not want to quit smoking    mammogram: was scheduled but had to cancel due to flare of back pain  She has number and is going to call and set up apt.       Past Medical History  Disease Name Date Onset Notes   Arthritis --  --    Cervical cancer screening 3/2020 --    Cervicalgia --  --    Cervicalgia 03/07/2017 --    DM2 (diabetes mellitus, type 2) --  --    Essential hypertension --  --    Hyperlipidemia --  --    Hypertension --  --    Lumbago/low back pain 03/07/2017 --    Paresthesia 03/07/2017 --    Sciatica 03/07/2017 --    Screening for breast cancer 2016 --    Screening for colon cancer --  --          Past Surgical History  Procedure Name Date Notes   Back --  --    Discectomy --  --    Tonsilectomy --  --    Tonsillectomy --  --          Medication List  Name Date Started Instructions   amlodipine 10 mg oral tablet 09/01/2020 take 1 tablet (10 mg) by oral route once daily for 30 days   apple cider vinegar oral  --     atenolol 25 mg oral tablet 09/01/2020 take 1 tablet (25 mg) by oral route once daily for 30 days   biotin 10,000 mcg oral capsule  take 1 capsule by oral route 2 times a day   Co Q-10 200 mg oral capsule  take 1 capsule by oral route daily   cyclobenzaprine 10 mg oral tablet  take 1 tablet (10 mg) by oral route 3 times per day   Fish, Flax andBorage Oil(Prim) 400-400-200 mg oral capsule  take 1 capsule by oral route 2 times a day   lisinopril 40 mg oral tablet 09/01/2020 take 1 tablet (40 mg) by oral route once daily for 30 days   metformin 500 mg oral tablet 09/01/2020 take 1 tablet by oral route daily for 30 days   Multi For Her 50 Plus 400-80 mcg oral capsule  take 1 capsule by oral route daily   Vitamin C 1,000 mg oral tablet  take 1 tablet by oral route 2 times a day   Vitamin D3 125 mcg (5,000 unit) oral tablet  take 1 tablet by oral route 2 times a day   Vitamins B Complex oral capsule  take 1 capsule by oral route daily   zinc 50 mg oral tablet  take 1 tablet by oral route daily         Allergy List  Allergen Name Date Reaction Notes   Codeine Sulfate --  --  --    ibuprofen --  --  --        Allergies Reconciled  Family Medical History  Disease Name Relative/Age Notes   Spine Problems Father/  Sister/   --    Family history of certain chronic disabling diseases; arthritis Father/  Sister/   Father; Sister         Social History  Finding Status Start/Stop Quantity Notes   Alcohol Use Current some day --/-- --  occasionally drinks, less than 1 drink per day, has been drinking for 21-30 years   lives with other --  --/-- --  --    Recreational Drug Use Never --/-- --  no   Retired. --  --/-- --  --    Single. --  --/-- --  --    Tobacco Current every day --/-- 1 PPD current every day smoker, 1 packs per day, smoked 11-20 years         Review of Systems  · Constitutional  o Denies  o : fatigue, night sweats  · Eyes  o Denies  o : double vision, blurred vision  · HENT  o Denies  o : vertigo, recent head  "injury  · Breasts  o Denies  o : abnormal changes in breast size  · Cardiovascular  o Denies  o : chest pain, irregular heart beats  · Respiratory  o Denies  o : shortness of breath, productive cough  · Gastrointestinal  o Denies  o : nausea, vomiting  · Genitourinary  o Denies  o : dysuria, urinary retention  · Integument  o Denies  o : hair growth change, new skin lesions  · Neurologic  o Admits  o : tingling or numbness  o Denies  o : altered mental status, seizures  · Musculoskeletal  o Denies  o : joint swelling, limitation of motion  · Endocrine  o Denies  o : cold intolerance, heat intolerance  · Heme-Lymph  o Denies  o : petechiae, lymph node enlargement or tenderness  · Allergic-Immunologic  o Denies  o : frequent illnesses      Vitals  Date Time BP Position Site L\R Cuff Size HR RR TEMP (F) WT  HT  BMI kg/m2 BSA m2 O2 Sat FR L/min FiO2 HC       07/29/2020 09:29 /76 Sitting    88 - R  97.6 211lbs 0oz 5'  6\" 34.06 2.11 95 %      08/03/2020 08:44 /74 Sitting    80 - R  97.4 213lbs 2oz 5'  3\" 37.75 2.07 99 %  21%    10/14/2020 01:55 /62 Sitting    81 - R  98.1 214lbs 16oz 5'  3\" 38.09 2.08 95 %  21%          Physical Examination  · Constitutional  o Appearance  o : no acute distress, well-nourished  · Head and Face  o Head  o :   § Inspection  § : atraumatic, normocephalic  · Eyes  o Eyes  o : extraocular movements intact, no scleral icterus, no conjunctival injection  · Ears, Nose, Mouth and Throat  o Ears  o :   § External Ears  § : normal  o Nose  o :   § Intranasal Exam  § : nares patent  o Oral Cavity  o :   § Oral Mucosa  § : moist mucous membranes  · Respiratory  o Respiratory Effort  o : breathing comfortably, symmetric chest rise  o Auscultation of Lungs  o : clear to asculatation bilaterally, no wheezes, rales, or rhonchii  · Cardiovascular  o Heart  o :   § Auscultation of Heart  § : regular rate and rhythm, no murmurs, rubs, or gallops  o Peripheral Vascular System  o : "   § Extremities  § : no edema  · Skin and Subcutaneous Tissue  o General Inspection  o : no lesions present, no areas of discoloration, skin turgor normal  · Neurologic  o Mental Status Examination  o :   § Orientation  § : grossly oriented to person, place and time  o Gait and Station  o :   § Gait Screening  § : normal gait  · Psychiatric  o General  o : normal mood and affect  · Left DM Foot Exam  o Sensation  o : normal sensory exam perceptible to 10-gram nylon monofilament exam (5/5), vibration and light touch.  o Visual Inspection  o : visual inspection is normal with no signs of breakdown, ulcerations or deformities unless otherwise noted.   o Vascular  o : palpable dorsalis pedis and posterir tibialis pulses present, normal capillary refill  · Right DM Foot Exam  o Sensation  o : normal sensory exam perceptible to 10-gram nylon monofilament exam (5/5), vibration and light touch.  o Visual Inspection  o : visual inspection is normal with no signs of breakdown, ulcerations or deformities unless otherwise noted.   o Vascular  o : palpable dorsalis pedis and posterir tibialis pulses present, normal capillary refill      Figure 1.0: Blank Canvas     Figure 2.0: Diabetic Foot Screen             Assessment  · Visit for screening mammogram     V76.12/Z12.31  Pt will reschedule mammogram apt  · Hyperlipidemia     272.4/E78.5  Pt will rtc for fasting labs. Discussed need to start statin if chol still elevated due to risks with dm.  · Nicotine dependence     305.1/F17.200  Discussed risks of smoking with patient including death, bp elevation, mi, stroke, CKD, blindness, slow wound healing. Pt is not ready to quit smoking at this time, encouraged to RTC once ready to quit.  · Lumbago/low back pain     724.3/M54.40  · Hypertension     401.9/I10  BP low today. Cont current meds. Discussed if still low at follow up we may discuss decreasing or d/c bp medication.   · DM2 (diabetes mellitus, type  2)     250.00/E11.9  Discussed dm. Pt will rtc for labs. Discussed medication recall. Pt will restart metformin and educated on monitoring bg at home. Requesting recent eye dr note.      Plan  · Orders  o Smoking cessation counseling, 3-10 minutes [facility] ([id number]) - 305.1/F17.200 - 10/14/2020  o ACO-17: Screened for tobacco use AND received tobacco cessation intervention (4004F) - 305.1/F17.200 - 10/14/2020  o Hgb A1c [facility] ([id number]) - 250.00/E11.9 - 10/14/2020  o Physical, Primary Care Panel (CBC, CMP, Lipid, TSH) [facility] ([id number], [id number], 11282, [id number]) - 401.9/I10, 250.00/E11.9, 724.3/M54.40, 305.1/F17.200 - 10/14/2020  o ACO-39: Current medications updated and reviewed (1159F, ) - - 10/14/2020  o ACO-14: Influenza immunization was not administered for reasons documented [facility] () - - 10/14/2020   Refuses  · Medications  o blood glucose strip-disp meter   SI meter, 100 strips to check bg once daily. dx dm type 2   DISP: (1) Kit with 0 refills  Prescribed on 10/14/2020     o Medications have been Reconciled  o Transition of Care or Provider Policy  · Instructions  o Advised that cheeses and other sources of dairy fats, animal fats, fast food, and the extras (candy, pastries, pies, doughnuts and cookies) all contain LDL raising nutrients. Advised to increase fruits, vegetables, whole grains, and to monitor portion sizes.   o *Form of nicotine being used: cigarettes  o Patient was strongly encouraged to discontinue use of any nicotine containing product or minimize the use of the product.  o Handouts were given to patient: labs  o Patient was educated/instructed on their diagnosis, treatment and medications prior to discharge from the clinic today.  o Electronically Identified Patient Education Materials Provided Electronically  · Disposition  o Call or Return if symptoms worsen or persist.  o Follow up in 3 months  o Follow up with Dr. Gibson            Electronically Signed by: CHRISTA GermainC -Author on   2020 02:47:51 PM  Electronically Co-signed by: Lakshmi Gibson MD -Reviewer on October 14, 2020 04:08:19 PM

## 2021-05-13 NOTE — PROGRESS NOTES
Progress Note      Patient Name: Karen Goldstein   Patient ID: [id number]   Sex: Female   YOB: 1960    Primary Care Provider: Lakshmi Gibson MD   Referring Provider: Lakshmi Gibson MD    Visit Date: July 27, 2020    Provider: Lakshmi Gibson MD   Location: [facility]   Location Address: [address] [place] [address]  [id number]   Location Phone: [phone]          Chief Complaint  · sebaceous cyst on back      History Of Present Illness  Karen Goldstein is a 60 year old /White female who presents for evaluation and treatment of:      patient presents for removal of sebaceous cyst on lower back    cyst is sometimes painful    not currently red or inflammed       Past Medical History  Disease Name Date Onset Notes   Arthritis --  --    Cervical cancer screening 3/2020 --    Cervicalgia --  --    Cervicalgia 03/07/2017 --    DM2 (diabetes mellitus, type 2) --  --    Essential hypertension --  --    Hyperlipidemia --  --    Hypertension --  --    Lumbago/low back pain 03/07/2017 --    Paresthesia 03/07/2017 --    Sciatica 03/07/2017 --    Screening for breast cancer 2016 --    Screening for colon cancer --  --          Past Surgical History  Procedure Name Date Notes   Back --  --    Discectomy --  --    Tonsilectomy --  --    Tonsillectomy --  --          Medication List  Name Date Started Instructions   amlodipine 10 mg oral tablet  take 1 tablet (10 mg) by oral route once daily   apple cider vinegar oral  --    atenolol 25 mg oral tablet  take 1 tablet (25 mg) by oral route once daily   biotin 10,000 mcg oral capsule  take 1 capsule by oral route 2 times a day   Co Q-10 200 mg oral capsule  take 1 capsule by oral route daily   cyclobenzaprine 10 mg oral tablet  take 1 tablet (10 mg) by oral route 3 times per day   Fish, Flax andBorage Oil(Prim) 400-400-200 mg oral capsule  take 1 capsule by oral route 2 times a day   gabapentin 300 mg oral capsule  take 1  "capsule (300 mg) by oral route 3 times per day   lisinopril 40 mg oral tablet  take 1 tablet (40 mg) by oral route once daily   Multi For Her 50 Plus 400-80 mcg oral capsule  take 1 capsule by oral route daily   oxycodone-acetaminophen  mg oral tablet  take 1 tablet by oral route 2 times a day   Vitamin C 1,000 mg oral tablet  take 1 tablet by oral route 2 times a day   Vitamin D3 125 mcg (5,000 unit) oral tablet  take 1 tablet by oral route 2 times a day   Vitamins B Complex oral capsule  take 1 capsule by oral route daily   zinc 50 mg oral tablet  take 1 tablet by oral route daily         Allergy List  Allergen Name Date Reaction Notes   Codeine Sulfate --  --  --    ibuprofen --  --  --          Family Medical History  Disease Name Relative/Age Notes   Spine Problems Father/  Sister/   --    Family history of certain chronic disabling diseases; arthritis Father/  Sister/   Father; Sister         Social History  Finding Status Start/Stop Quantity Notes   Alcohol Use Current some day --/-- --  occasionally drinks, less than 1 drink per day, has been drinking for 21-30 years   lives with other --  --/-- --  --    Recreational Drug Use Never --/-- --  no   Retired. --  --/-- --  --    Single. --  --/-- --  --    Tobacco Current every day --/-- 1 PPD current every day smoker, 1 packs per day, smoked 11-20 years         Review of Systems  · Constitutional  o Denies  o : fever, fatigue, weight loss, weight gain  · Cardiovascular  o Denies  o : lower extremity edema, claudication, chest pressure, palpitations  · Respiratory  o Denies  o : shortness of breath, wheezing, frequent cough, hemoptysis, dyspnea on exertion  · Gastrointestinal  o Denies  o : nausea, vomiting, diarrhea, constipation, abdominal pain  · Integument  o Admits  o : new skin lesions      Vitals  Date Time BP Position Site L\R Cuff Size HR RR TEMP (F) WT  HT  BMI kg/m2 BSA m2 O2 Sat        03/28/2017 10:58 AM      80 - R   200lbs 0oz 5'  6\" " "32.28 2.06 97 %    07/13/2020 10:23 /74 Sitting    83 - R  97.5 207lbs 2oz 5'  6\" 33.43 2.09 98 %    07/27/2020 08:13 /82 Sitting    81 - R  97 211lbs 8oz 5'  6\" 34.14 2.11 97 %          Physical Examination  · Constitutional  o Appearance  o : no acute distress, well-nourished  · Head and Face  o Head  o :   § Inspection  § : atraumatic, normocephalic  · Eyes  o Eyes  o : extraocular movements intact, no scleral icterus, no conjunctival injection  · Ears, Nose, Mouth and Throat  o Ears  o :   § External Ears  § : normal  o Nose  o :   § Intranasal Exam  § : nares patent  o Oral Cavity  o :   § Oral Mucosa  § : moist mucous membranes  · Respiratory  o Respiratory Effort  o : breathing comfortably, symmetric chest rise  o Auscultation of Lungs  o : clear to asculatation bilaterally, no wheezes, rales, or rhonchii  · Cardiovascular  o Heart  o :   § Auscultation of Heart  § : regular rate and rhythm, no murmurs, rubs, or gallops  o Peripheral Vascular System  o :   § Extremities  § : no edema  · Skin and Subcutaneous Tissue  o General Inspection  o : 1cm sebaceous cyst on right lower back.   · Neurologic  o Mental Status Examination  o :   § Orientation  § : grossly oriented to person, place and time  o Gait and Station  o :   § Gait Screening  § : normal gait  · Psychiatric  o General  o : normal mood and affect     Removal of sebaceous cyst:  Risks of procedure discussed with patient including but not limited to bleeding, infection, damage nerve/vessels/underlying tissues, scarring, need for repeat procedures. Written consent obtained.   Area on lower back was prepped and draped in usual sterile fashion. Local analgesia was obtained by subcutaneous infiltration of 2cc of 1% lidocaine with epinephrine. Incision was made over the cyst and tissue dissected from the cyst wall. Cyst wall and contents were removed. Hemostasis was achieved with direct pressure. wound was flushed with sterile saline. Sterile " packing strip was placed in the center of the wound at deepest point and distal ends of incision were closed with 4 simple interupted sutures. Betadine was cleaned from skin. Wound was dressed with antibiotic ointment and adhesive bandage. Patient tolerated procedure well.             Assessment  · Sebaceous cyst     706.2/L72.3  removed  patient tolerated procedure well  return in 2 days for removal of packing and closure of incision    Problems Reconciled  Plan  · Orders  o ACO-39: Current medications updated and reviewed () - - 07/27/2020  o Excision of sebaceous cyst of back (77911) - 706.2/L72.3 - 08/03/2020  · Medications  o Medications have been Reconciled  o Transition of Care or Provider Policy  · Instructions  o Patient was educated/instructed on their diagnosis, treatment and medications prior to discharge from the clinic today.  o Call the office with any concerns or questions.  · Disposition  o follow up in 2 days            Electronically Signed by: Lakshmi Gibson MD -Author on August 3, 2020 09:33:58 AM

## 2021-05-14 VITALS
TEMPERATURE: 98.1 F | DIASTOLIC BLOOD PRESSURE: 62 MMHG | HEART RATE: 81 BPM | BODY MASS INDEX: 38.09 KG/M2 | WEIGHT: 215 LBS | SYSTOLIC BLOOD PRESSURE: 100 MMHG | OXYGEN SATURATION: 95 % | HEIGHT: 63 IN

## 2021-05-14 VITALS
WEIGHT: 212.37 LBS | DIASTOLIC BLOOD PRESSURE: 80 MMHG | HEIGHT: 63 IN | BODY MASS INDEX: 37.63 KG/M2 | HEART RATE: 87 BPM | TEMPERATURE: 97 F | SYSTOLIC BLOOD PRESSURE: 130 MMHG | OXYGEN SATURATION: 96 %

## 2021-05-14 NOTE — PROGRESS NOTES
Progress Note      Patient Name: Karen Goldstein   Patient ID: [id number]   Sex: Female   YOB: 1960    Primary Care Provider: Lakshmi Gibson MD   Referring Provider: Lakshmi Gibson MD    Visit Date: January 15, 2021    Provider: Lakshmi Gibson MD   Location: [facility]   Location Address: [address] [place] [address]  [id number]   Location Phone: [phone]          Chief Complaint  · Welcome to Medicare Visit      History Of Present Illness  The patient is a 60 year old /White female who has come to this office for her Welcome to Medicare Visit. Her Primary Care Provider is Lakshmi iGbson MD. Her comprehensive Care Team list, including suppliers, has been updated on the Facesheet. Her medical/surgical/family history, height, weight, BMI, and blood pressure have been reviewed and are in the chart.   Medications are listed in the medication list.   The active problem list includes: Arthritis, Cervicalgia, DM2 (diabetes mellitus, type 2), Hyperlipidemia, Hypertension, Lumbago/low back pain, Paresthesia, and Sciatica   The patient does not have a history of substance use.   Patient reports her diet is adequate.   Patient reports her physical activity is adequate.   A hearing loss screen was completed today and the result is negative.   Patient does not have any risk factors for depression. Patient completed the PHQ-9 today and it has been scanned in the chart. The total score is.   The Timed-Up-and-Go screen was administered today and the result is positive.   The [place] Index of [place] in ADLs indicated full function (score of 6).   A Falls Risk Assessment has been completed, including a review of home fall hazards and medication review.   Her level of safety is noted to be within normal limits. Her balance/gait is within normal limits. There WHAT IS FALL ASSESSMENT in the past year. Patient-specific home safety recommendations have been  reviewed and a copy has been given to patient.   She denies issues with leaking urine.   There are no additional risk factors identified.   Living Will/Advanced Directive previously executed but not in chart.   Personalized health advice was given to the patient and a written health screening schedule was established; see Plan for details.             Past Medical History  Disease Name Date Onset Notes   Arthritis --  --    Cervical cancer screening 3/2020 --    Cervicalgia --  --    Cervicalgia 03/07/2017 --    DM2 (diabetes mellitus, type 2) --  --    Essential hypertension --  --    Hyperlipidemia --  --    Hypertension --  --    Lumbago/low back pain 03/07/2017 --    Paresthesia 03/07/2017 --    Sciatica 03/07/2017 --    Screening for breast cancer 2016 --    Screening for colon cancer --  --          Past Surgical History  Procedure Name Date Notes   Back --  --    Discectomy --  --    Tonsilectomy --  --    Tonsillectomy --  --          Medication List  Name Date Started Instructions   amlodipine 10 mg oral tablet 09/01/2020 take 1 tablet (10 mg) by oral route once daily for 30 days   apple cider vinegar oral  --    atenolol 25 mg oral tablet 09/01/2020 take 1 tablet (25 mg) by oral route once daily for 30 days   biotin 10,000 mcg oral capsule  take 1 capsule by oral route 2 times a day   blood glucose strip-disp meter 10/14/2020 1 meter, 100 strips to check bg once daily. dx dm type 2   Co Q-10 200 mg oral capsule  take 1 capsule by oral route daily   cyclobenzaprine 10 mg oral tablet  take 1 tablet (10 mg) by oral route 3 times per day   Fish, Flax andBorage Oil(Prim) 400-400-200 mg oral capsule  take 1 capsule by oral route 2 times a day   lisinopril 40 mg oral tablet 09/01/2020 take 1 tablet (40 mg) by oral route once daily for 30 days   metformin 500 mg oral tablet 09/01/2020 take 1 tablet by oral route daily for 30 days   Multi For Her 50 Plus 400-80 mcg oral capsule  take 1 capsule by oral route daily    Vitamin C 1,000 mg oral tablet  take 1 tablet by oral route 2 times a day   Vitamin D3 125 mcg (5,000 unit) oral tablet  take 1 tablet by oral route 2 times a day   Vitamins B Complex oral capsule  take 1 capsule by oral route daily   zinc 50 mg oral tablet  take 1 tablet by oral route daily         Allergy List  Allergen Name Date Reaction Notes   Codeine Sulfate --  --  --    ibuprofen --  --  --          Family Medical History  Disease Name Relative/Age Notes   Spine Problems Father/  Sister/   --    Family history of certain chronic disabling diseases; arthritis Father/  Sister/   Father; Sister         Social History  Finding Status Start/Stop Quantity Notes   Alcohol Use Current some day --/-- --  occasionally drinks, less than 1 drink per day, has been drinking for 21-30 years   lives with other --  --/-- --  --    Recreational Drug Use Never --/-- --  no   Retired. --  --/-- --  --    Single. --  --/-- --  --    Tobacco Current every day --/-- 1 PPD current every day smoker, 1 packs per day, smoked 11-20 years         Physical Examination  · Head and Face  o Head  o :   § Inspection  § : atraumatic, normocephalic  · Eyes  o Eyes  o : extraocular movements intact, no scleral icterus, no conjunctival injection  · Ears, Nose, Mouth and Throat  o Ears  o :   § External Ears  § : normal  o Nose  o :   § Intranasal Exam  § : nares patent  o Oral Cavity  o :   § Oral Mucosa  § : moist mucous membranes  · Respiratory  o Respiratory Effort  o : breathing comfortably, symmetric chest rise  o Auscultation of Lungs  o : clear to asculatation bilaterally, no wheezes, rales, or rhonchii  · Cardiovascular  o Heart  o :   § Auscultation of Heart  § : regular rate and rhythm, no murmurs, rubs, or gallops  o Peripheral Vascular System  o :   § Extremities  § : no edema  · Neurologic  o Mental Status Examination  o :   § Orientation  § : grossly oriented to person, place and time  o Gait and Station  o :   § Gait  Screening  § : normal gait  · Psychiatric  o General  o : normal mood and affect              Assessment  · Welcome to Medicare preventive visit     V70.0/Z00.00  · Screening for depression     V79.0/Z13.89      Plan  · Orders  o Falls Risk Assessment Completed (3288F) - V70.0/Z00.00 - 01/15/2021  o Brief hearing screening (written) [facility] () - V70.0/Z00.00 - 01/15/2021  o Welcome to Medicare Exam () - V70.0/Z00.00 - 01/15/2021  o Presence/Absence of Urinary Incontinence Assessed [facility] (1090F) - V70.0/Z00.00 - 01/15/2021  o ACO-13: Fall Risk Screening with no falls in past year or only one fall without injury in the past year (1101F) - V70.0/Z00.00 - 01/15/2021  o ACO-18: Negative screen for clinical depression using a standardized tool () - V79.0/Z13.89 - 01/15/2021  o ACO-39: Current medications updated and reviewed (, 1159F) - - 01/15/2021  · Medications  o Medications have been Reconciled  o Transition of Care or Provider Policy  · Instructions  o Written health screening schedule for next 5-10 years was established with patient; information scanned in chart and given to patient.  o Fall prevention methods discussed and a copy of recommendations given to patient.            Electronically Signed by: Lakshmi Gibson MD -Author on February 10, 2021 08:06:28 AM

## 2021-05-14 NOTE — PROGRESS NOTES
Progress Note      Patient Name: Karen Goldstein   Patient ID: [id number]   Sex: Female   YOB: 1960    Primary Care Provider: Lakshmi Gibson MD   Referring Provider: Lakshmi Gibson MD    Visit Date: January 15, 2021    Provider: Lakshmi Gibson MD   Location: [facility]   Location Address: [address] [place] [address], [address]  [id number]   Location Phone: [phone]          Chief Complaint  · 3 month f/up  · DM II  · HTN      History Of Present Illness  Karen Goldstein is a 60 year old /White female who presents for evaluation and treatment of:      3 month f/up-    DMII- well controlled per patient, due for labs today, tolerating medication well    HTN- well controlled in clinic today, tolerating medication well, denies headache, chest pain, vision changes, dizziness    due for mammogram- no showed in Sept 2020 for mammo, could not stand so she had to cancel. will reschedule when she can stand       Past Medical History  Disease Name Date Onset Notes   Arthritis --  --    Cervical cancer screening 3/2020 --    Cervicalgia --  --    Cervicalgia 03/07/2017 --    DM2 (diabetes mellitus, type 2) --  --    Essential hypertension --  --    Hyperlipidemia --  --    Hypertension --  --    Lumbago/low back pain 03/07/2017 --    Paresthesia 03/07/2017 --    Sciatica 03/07/2017 --    Screening for breast cancer 2016 --    Screening for colon cancer --  --          Past Surgical History  Procedure Name Date Notes   Back --  --    Discectomy --  --    Tonsilectomy --  --    Tonsillectomy --  --          Medication List  Name Date Started Instructions   amlodipine 10 mg oral tablet 09/01/2020 take 1 tablet (10 mg) by oral route once daily for 30 days   apple cider vinegar oral  --    atenolol 25 mg oral tablet 09/01/2020 take 1 tablet (25 mg) by oral route once daily for 30 days   biotin 10,000 mcg oral capsule  take 1 capsule by oral route 2 times a day   blood glucose  strip-disp meter 10/14/2020 1 meter, 100 strips to check bg once daily. dx dm type 2   Co Q-10 200 mg oral capsule  take 1 capsule by oral route daily   cyclobenzaprine 10 mg oral tablet  take 1 tablet (10 mg) by oral route 3 times per day   Fish, Flax andBorage Oil(Prim) 400-400-200 mg oral capsule  take 1 capsule by oral route 2 times a day   lisinopril 40 mg oral tablet 09/01/2020 take 1 tablet (40 mg) by oral route once daily for 30 days   metformin 500 mg oral tablet 09/01/2020 take 1 tablet by oral route daily for 30 days   Multi For Her 50 Plus 400-80 mcg oral capsule  take 1 capsule by oral route daily   Vitamin C 1,000 mg oral tablet  take 1 tablet by oral route 2 times a day   Vitamin D3 125 mcg (5,000 unit) oral tablet  take 1 tablet by oral route 2 times a day   Vitamins B Complex oral capsule  take 1 capsule by oral route daily   zinc 50 mg oral tablet  take 1 tablet by oral route daily         Allergy List  Allergen Name Date Reaction Notes   Codeine Sulfate --  --  --    ibuprofen --  --  --        Allergies Reconciled  Family Medical History  Disease Name Relative/Age Notes   Spine Problems Father/  Sister/   --    Family history of certain chronic disabling diseases; arthritis Father/  Sister/   Father; Sister         Social History  Finding Status Start/Stop Quantity Notes   Alcohol Use Current some day --/-- --  occasionally drinks, less than 1 drink per day, has been drinking for 21-30 years   lives with other --  --/-- --  --    Recreational Drug Use Never --/-- --  no   Retired. --  --/-- --  --    Single. --  --/-- --  --    Tobacco Current every day --/-- 1 PPD current every day smoker, 1 packs per day, smoked 11-20 years         Review of Systems  · Constitutional  o Denies  o : fever, fatigue, weight loss, weight gain  · Cardiovascular  o Denies  o : lower extremity edema, claudication, chest pressure, palpitations  · Respiratory  o Denies  o : shortness of breath, wheezing, cough,  "hemoptysis, dyspnea on exertion  · Gastrointestinal  o Denies  o : nausea, vomiting, diarrhea, constipation, abdominal pain      Vitals  Date Time BP Position Site L\R Cuff Size HR RR TEMP (F) WT  HT  BMI kg/m2 BSA m2 O2 Sat FR L/min FiO2 HC       08/03/2020 08:44 /74 Sitting    80 - R  97.4 213lbs 2oz 5'  3\" 37.75 2.07 99 %  21%    10/14/2020 01:55 /62 Sitting    81 - R  98.1 214lbs 16oz 5'  3\" 38.09 2.08 95 %  21%    01/15/2021 01:34 /80 Sitting    87 - R  97 212lbs 6oz 5'  3\" 37.62 2.07 96 %            Physical Examination  · Constitutional  o Appearance  o : no acute distress, well-nourished  · Head and Face  o Head  o :   § Inspection  § : atraumatic, normocephalic  · Eyes  o Eyes  o : extraocular movements intact, no scleral icterus, no conjunctival injection  · Ears, Nose, Mouth and Throat  o Ears  o :   § External Ears  § : normal  o Nose  o :   § Intranasal Exam  § : nares patent  o Oral Cavity  o :   § Oral Mucosa  § : moist mucous membranes  · Respiratory  o Respiratory Effort  o : breathing comfortably, symmetric chest rise  o Auscultation of Lungs  o : clear to asculatation bilaterally, no wheezes, rales, or rhonchii  · Cardiovascular  o Heart  o :   § Auscultation of Heart  § : regular rate and rhythm, no murmurs, rubs, or gallops  o Peripheral Vascular System  o :   § Extremities  § : no edema  · Neurologic  o Mental Status Examination  o :   § Orientation  § : grossly oriented to person, place and time  o Gait and Station  o :   § Gait Screening  § : normal gait  · Psychiatric  o General  o : normal mood and affect              Assessment  · Need for hepatitis C screening test     V73.89/Z11.59  · Hyperlipidemia     272.4/E78.5  · Hypertension     401.9/I10  well controlled  continue current medications  checking CMP lab today  · DM2 (diabetes mellitus, type 2)     250.00/E11.9  well controlled per patient  tolerating medication well  checking labs today, will adjust medication " regimen based on results      Plan  · Orders  o Hepatitis C antibody MEDICARE screening [facility] (, [address]) - V73.89/Z11.59 - 01/15/2021  o Hgb A1c [facility] ([address]2008) - 250.00/E11.9 - 01/15/2021  o Physical, Primary Care Panel (CBC, CMP, Lipid, TSH) [facility] (26480, 33002, 24612, 29584) - 401.9/I10, 272.4/E78.5 - 01/15/2021  o ACO-39: Current medications updated and reviewed (, 1159F) - - 01/15/2021  · Medications  o Medications have been Reconciled  o Transition of Care or Provider Policy  · Instructions  o Medicare suggests a once in a lifetime screening for Hepatitis C for all Medicare beneficiaries born between 5199-3039.  o Advised that cheeses and other sources of dairy fats, animal fats, fast food, and the extras (candy, pastries, pies, doughnuts and cookies) all contain LDL raising nutrients. Advised to increase fruits, vegetables, whole grains, and to monitor portion sizes.   o Take all medications as prescribed/directed.  o Patient was educated/instructed on their diagnosis, treatment and medications prior to discharge from the clinic today.  o Call the office with any concerns or questions.  · Disposition  o Follow up in 3 months            Electronically Signed by: Lakshmi Gibson MD -Author on February 10, 2021 08:11:06 AM

## 2021-05-15 VITALS
HEART RATE: 80 BPM | HEIGHT: 63 IN | DIASTOLIC BLOOD PRESSURE: 74 MMHG | BODY MASS INDEX: 37.76 KG/M2 | SYSTOLIC BLOOD PRESSURE: 130 MMHG | TEMPERATURE: 97.4 F | OXYGEN SATURATION: 99 % | WEIGHT: 213.12 LBS

## 2021-05-15 VITALS
OXYGEN SATURATION: 98 % | HEIGHT: 66 IN | HEART RATE: 83 BPM | WEIGHT: 207.12 LBS | SYSTOLIC BLOOD PRESSURE: 122 MMHG | BODY MASS INDEX: 33.29 KG/M2 | TEMPERATURE: 97.5 F | DIASTOLIC BLOOD PRESSURE: 74 MMHG

## 2021-05-15 VITALS
DIASTOLIC BLOOD PRESSURE: 76 MMHG | WEIGHT: 211 LBS | OXYGEN SATURATION: 95 % | TEMPERATURE: 97.6 F | HEIGHT: 66 IN | SYSTOLIC BLOOD PRESSURE: 120 MMHG | HEART RATE: 88 BPM | BODY MASS INDEX: 33.91 KG/M2

## 2021-05-15 VITALS
DIASTOLIC BLOOD PRESSURE: 82 MMHG | OXYGEN SATURATION: 97 % | WEIGHT: 211.5 LBS | HEIGHT: 66 IN | TEMPERATURE: 97 F | SYSTOLIC BLOOD PRESSURE: 124 MMHG | BODY MASS INDEX: 33.99 KG/M2 | HEART RATE: 81 BPM

## 2021-07-01 ENCOUNTER — OFFICE VISIT (OUTPATIENT)
Dept: INTERNAL MEDICINE | Facility: CLINIC | Age: 61
End: 2021-07-01

## 2021-07-01 VITALS
HEART RATE: 91 BPM | TEMPERATURE: 97.4 F | DIASTOLIC BLOOD PRESSURE: 80 MMHG | RESPIRATION RATE: 15 BRPM | OXYGEN SATURATION: 94 % | WEIGHT: 223 LBS | HEIGHT: 63 IN | BODY MASS INDEX: 39.51 KG/M2 | SYSTOLIC BLOOD PRESSURE: 138 MMHG

## 2021-07-01 DIAGNOSIS — H92.01 RIGHT EAR PAIN: ICD-10-CM

## 2021-07-01 DIAGNOSIS — J01.90 ACUTE SINUSITIS, RECURRENCE NOT SPECIFIED, UNSPECIFIED LOCATION: Primary | ICD-10-CM

## 2021-07-01 PROCEDURE — 99213 OFFICE O/P EST LOW 20 MIN: CPT | Performed by: PHYSICIAN ASSISTANT

## 2021-07-01 RX ORDER — AMOXICILLIN AND CLAVULANATE POTASSIUM 875; 125 MG/1; MG/1
1 TABLET, FILM COATED ORAL 2 TIMES DAILY
Qty: 14 TABLET | Refills: 0 | Status: SHIPPED | OUTPATIENT
Start: 2021-07-01 | End: 2021-07-08

## 2021-07-01 NOTE — PROGRESS NOTES
Chief Complaint  Otitis Media ([facility]. finished abx )    Subjective          Karen Goldstein presents to [facility]  Pt here with c/o pain in R ear  She states sx worse when she bends over. Admits to forehead pain too.  She was seen by  6/22 and was given ear drops and antibiotics.  Denies fever, chills, sore throat, nasal congestion, runny nose, cough, sob.  No sick contacts.   Appetite is normal   Energy normal.   She has tried ibuprofen which helped with ear pain.  She can taste and smell.      Past Medical History:   Diagnosis Date   • Arthritis    • Cervicalgia 03/07/2017   • Diabetes mellitus (CMS/[facility])    • DM2 (diabetes mellitus, type 2) (CMS/[facility])    • Essential hypertension    • Hyperlipidemia    • Hypertension    • Hypertension    • Lumbago 03/07/2017    low back pain   • Paresthesia 03/07/2017   • Sciatica 03/07/2017        Past Surgical History:   Procedure Laterality Date   • BACK SURGERY     • OTHER SURGICAL HISTORY      discectomy   • TONSILLECTOMY          Current Outpatient Medications on File Prior to Visit   Medication Sig Dispense Refill   • amLODIPine (NORVASC) 10 MG tablet amlodipine 10 mg oral tablet take 1 tablet (10 mg) by oral route once daily for 30 days 3/2/2021  Active     • Apple Cider Vinegar 188 MG capsule      • ascorbic acid (VITAMIN C) 1000 MG tablet Vitamin C 1,000 mg oral tablet take 1 tablet by oral route 2 times a day   Active     • atenolol (TENORMIN) 25 MG tablet atenolol 25 mg oral tablet take 1 tablet (25 mg) by oral route once daily for 30 days 3/2/2021  Active     • B Complex Vitamins (B COMPLEX-B12 PO) Vitamins B Complex oral capsule take 1 capsule by oral route daily   Active     • Biotin 10 MG capsule biotin 10,000 mcg oral capsule take 1 capsule by oral route 2 times a day   Active     • Cholecalciferol 125 MCG (5000 UT) tablet Vitamin D3 125 mcg (5,000 unit) oral tablet take 1 tablet by oral route 2  "times a day   Active     • Coenzyme Q10 200 MG capsule Co Q-10 200 mg oral capsule take 1 capsule by oral route daily   Active     • lisinopril (PRINIVIL,ZESTRIL) 40 MG tablet lisinopril 40 mg oral tablet take 1 tablet (40 mg) by oral route once daily for 30 days 3/2/2021  Active     • metFORMIN (GLUCOPHAGE) 500 MG tablet metformin 500 mg oral tablet take 1 tablet by oral route daily for 30 days 3/2/2021  Active     • Zinc 100 MG tablet zinc 50 mg oral tablet take 1 tablet by oral route daily   Active       No current facility-administered medications on file prior to visit.        Allergies   Allergen Reactions   • Codeine Nausea And Vomiting   • Ibuprofen GI Intolerance       Social History     Tobacco Use   Smoking Status Current Every Day Smoker   • Packs/day: 1.00   • Years: 20.00   • Pack years: 20.00   • Types: Cigarettes   Smokeless Tobacco Never Used   Tobacco Comment    smoked 11-20 years          Objective   Vital Signs:   /80   Pulse 91   Temp 97.4 °F (36.3 °C)   Resp 15   Ht 160 cm (63\")   Wt 101 kg (223 lb)   SpO2 94%   BMI 39.50 kg/m²     Physical Exam  Vitals reviewed.   Constitutional:       Appearance: Normal appearance.   HENT:      Head: Normocephalic and atraumatic.      Nose: Nose normal.      Mouth/Throat:      Mouth: Mucous membranes are moist.   Eyes:      Extraocular Movements: Extraocular movements intact.      Conjunctiva/sclera: Conjunctivae normal.      Pupils: Pupils are equal, round, and reactive to light.   Cardiovascular:      Rate and Rhythm: Normal rate and regular rhythm.   Pulmonary:      Effort: Pulmonary effort is normal.      Breath sounds: Normal breath sounds.   Abdominal:      General: Abdomen is flat. Bowel sounds are normal.      Palpations: Abdomen is soft.   Musculoskeletal:         General: Normal range of motion.   Neurological:      General: No focal deficit present.      Mental Status: She is alert and oriented to person, place, and time. "   Psychiatric:         Mood and Affect: Mood normal.        Result Review :                 Assessment and Plan    Diagnoses and all orders for this visit:    1. Acute sinusitis, recurrence not specified, unspecified location (Primary)  Comments:  Discussed sinus infection. Increase water intake, rest, hand hygiene. Tylenol/motrin PRN fever or pain. Start OTC antihistamines and nasal steroid. Pt will let us know if symptoms not resolved in 7-10 days or if symptoms worsen.    2. Right ear pain    Other orders  -     amoxicillin-clavulanate (Augmentin) 875-125 MG per tablet; Take 1 tablet by mouth 2 (Two) Times a Day for 7 days.  Dispense: 14 tablet; Refill: 0        Follow Up   No follow-ups on file.  Patient was given instructions and counseling regarding her condition or for health maintenance advice. Please see specific information pulled into the AVS if appropriate.

## 2021-09-02 PROBLEM — I10 ESSENTIAL HYPERTENSION: Status: ACTIVE | Noted: 2021-01-01

## 2021-09-02 PROBLEM — E11.9 DM2 (DIABETES MELLITUS, TYPE 2) (HCC): Status: ACTIVE | Noted: 2021-01-01

## 2021-09-02 PROBLEM — M54.30 SCIATICA: Status: ACTIVE | Noted: 2017-03-07

## 2021-09-02 PROBLEM — M19.90 ARTHRITIS: Status: ACTIVE | Noted: 2021-01-01

## 2021-09-02 PROBLEM — E78.5 HYPERLIPIDEMIA: Status: ACTIVE | Noted: 2021-01-01

## 2021-09-03 NOTE — TELEPHONE ENCOUNTER
Patient walked in and said refills were supposed to be sent at her appointment yesterday but weren't there. Meds sent to pharmacy.

## 2021-10-15 NOTE — PROGRESS NOTES
Patient presents for removal of cyst from right anterior thigh. Cyst has been growing in size and has become bothersome.     Risk and Benefits were discussed and Written consent obtained for procedure.   Patient was prepped and draped in standard sterile fashion.   5cc of 1% lidocaine with epinephrine was instilled subcutaneously into area around lesion. After an appropriate waiting period for medication to take effect, patient's sensation was tested. Patient complained that she was still able to feel sharp sensation in several areas around the lesion. An additional 5cc of 1% lidocaine with epinephrine was instilled subcutaneously into the area. Again an appropriate waiting period was given and sensation tested. Patient continued to complain that she could feel sharp sensation in one area just proximal to the lesion in the line of planned incision.   It was determined at that time that it would be best to stop the procedure and refer to dermatology for further management of cyst.     Patient left clinic in stable condition.

## 2021-11-02 NOTE — TELEPHONE ENCOUNTER
Caller: Karen Goldstein    Relationship: Self    Best call back number: [phone]     Who are you requesting to speak with (clinical staff, provider,  specific staff member): CLINICAL    What was the call regarding: THE PATIENT STATED SHE WAS REFERRED TO DERMATOLOGY BY DOCTOR YIN.  PATIENT STATED SHE HAS YET BEEN REACHED OUT TO IN REGARDS TO AN APPOINTMENT. SHE IS REQUESTING TO BE FOLLOWED UP WITH FROM OFFICE.     Do you require a callback: YES, PLEASE CALL AND ADVISE

## 2021-11-19 NOTE — TELEPHONE ENCOUNTER
Red rule verified and correct.    Pt calling regarding referral to dermatology.    Advised it was sent on 10/15/21 and approved.    Recommended she call the Derm office.      [facility]  [facility] [address] [phone]    Patient verbalized understanding.

## 2022-01-01 ENCOUNTER — APPOINTMENT (OUTPATIENT)
Dept: CT IMAGING | Facility: HOSPITAL | Age: 62
End: 2022-01-01

## 2022-01-01 ENCOUNTER — TELEPHONE (OUTPATIENT)
Dept: NUTRITION | Facility: HOSPITAL | Age: 62
End: 2022-01-01

## 2022-01-01 ENCOUNTER — APPOINTMENT (OUTPATIENT)
Dept: ULTRASOUND IMAGING | Facility: HOSPITAL | Age: 62
End: 2022-01-01

## 2022-01-01 ENCOUNTER — APPOINTMENT (OUTPATIENT)
Dept: GENERAL RADIOLOGY | Facility: HOSPITAL | Age: 62
End: 2022-01-01

## 2022-01-01 ENCOUNTER — TELEPHONE (OUTPATIENT)
Dept: INTERNAL MEDICINE | Facility: CLINIC | Age: 62
End: 2022-01-01

## 2022-01-01 ENCOUNTER — APPOINTMENT (OUTPATIENT)
Dept: NEUROLOGY | Facility: HOSPITAL | Age: 62
End: 2022-01-01

## 2022-01-01 ENCOUNTER — HOSPITAL ENCOUNTER (OUTPATIENT)
Dept: MRI IMAGING | Facility: HOSPITAL | Age: 62
Discharge: HOME | End: 2022-07-15
Admitting: INTERNAL MEDICINE

## 2022-01-01 ENCOUNTER — TELEPHONE (OUTPATIENT)
Dept: ONCOLOGY | Facility: HOSPITAL | Age: 62
End: 2022-01-01

## 2022-01-01 ENCOUNTER — TELEPHONE (OUTPATIENT)
Dept: GASTROENTEROLOGY | Facility: CLINIC | Age: 62
End: 2022-01-01

## 2022-01-01 ENCOUNTER — HOSPITAL ENCOUNTER (EMERGENCY)
Facility: HOSPITAL | Age: 62
Discharge: HOME | End: 2022-02-22
Attending: EMERGENCY MEDICINE | Admitting: EMERGENCY MEDICINE

## 2022-01-01 ENCOUNTER — LAB (OUTPATIENT)
Dept: ONCOLOGY | Facility: HOSPITAL | Age: 62
End: 2022-01-01

## 2022-01-01 ENCOUNTER — CONSULT (OUTPATIENT)
Dept: ONCOLOGY | Facility: HOSPITAL | Age: 62
End: 2022-01-01

## 2022-01-01 ENCOUNTER — OFFICE VISIT (OUTPATIENT)
Dept: PULMONOLOGY | Facility: CLINIC | Age: 62
End: 2022-01-01

## 2022-01-01 ENCOUNTER — TRANSITIONAL CARE MANAGEMENT TELEPHONE ENCOUNTER (OUTPATIENT)
Dept: CALL CENTER | Facility: HOSPITAL | Age: 62
End: 2022-01-01

## 2022-01-01 ENCOUNTER — OFFICE VISIT (OUTPATIENT)
Dept: INTERNAL MEDICINE | Facility: CLINIC | Age: 62
End: 2022-01-01

## 2022-01-01 ENCOUNTER — HOSPITAL ENCOUNTER (OUTPATIENT)
Dept: MRI IMAGING | Facility: HOSPITAL | Age: 62
Discharge: HOME | End: 2022-04-06
Admitting: PHYSICIAN ASSISTANT

## 2022-01-01 ENCOUNTER — APPOINTMENT (OUTPATIENT)
Dept: ONCOLOGY | Facility: HOSPITAL | Age: 62
End: 2022-01-01

## 2022-01-01 ENCOUNTER — OFFICE VISIT (OUTPATIENT)
Dept: PODIATRY | Facility: CLINIC | Age: 62
End: 2022-01-01

## 2022-01-01 ENCOUNTER — TELEPHONE (OUTPATIENT)
Dept: ONCOLOGY | Facility: OTHER | Age: 62
End: 2022-01-01

## 2022-01-01 ENCOUNTER — APPOINTMENT (OUTPATIENT)
Dept: MRI IMAGING | Facility: HOSPITAL | Age: 62
End: 2022-01-01

## 2022-01-01 ENCOUNTER — TELEPHONE (OUTPATIENT)
Dept: SURGERY | Facility: CLINIC | Age: 62
End: 2022-01-01

## 2022-01-01 ENCOUNTER — READMISSION MANAGEMENT (OUTPATIENT)
Dept: CALL CENTER | Facility: HOSPITAL | Age: 62
End: 2022-01-01

## 2022-01-01 ENCOUNTER — DOCUMENTATION (OUTPATIENT)
Dept: ONCOLOGY | Facility: HOSPITAL | Age: 62
End: 2022-01-01

## 2022-01-01 ENCOUNTER — HOSPITAL ENCOUNTER (INPATIENT)
Facility: HOSPITAL | Age: 62
LOS: 3 days | End: 2022-07-23
Attending: EMERGENCY MEDICINE | Admitting: FAMILY MEDICINE

## 2022-01-01 ENCOUNTER — HOSPITAL ENCOUNTER (INPATIENT)
Facility: HOSPITAL | Age: 62
LOS: 2 days | Discharge: HOME | End: 2022-07-08
Attending: EMERGENCY MEDICINE | Admitting: INTERNAL MEDICINE

## 2022-01-01 VITALS
RESPIRATION RATE: 12 BRPM | HEIGHT: 67 IN | HEART RATE: 76 BPM | DIASTOLIC BLOOD PRESSURE: 66 MMHG | OXYGEN SATURATION: 97 % | TEMPERATURE: 97.9 F | BODY MASS INDEX: 31.08 KG/M2 | WEIGHT: 198 LBS | SYSTOLIC BLOOD PRESSURE: 112 MMHG

## 2022-01-01 VITALS
HEART RATE: 80 BPM | OXYGEN SATURATION: 97 % | WEIGHT: 203.2 LBS | BODY MASS INDEX: 32.66 KG/M2 | SYSTOLIC BLOOD PRESSURE: 114 MMHG | HEIGHT: 66 IN | DIASTOLIC BLOOD PRESSURE: 72 MMHG | TEMPERATURE: 97.9 F

## 2022-01-01 VITALS
TEMPERATURE: 98.4 F | BODY MASS INDEX: 29.69 KG/M2 | HEIGHT: 66 IN | DIASTOLIC BLOOD PRESSURE: 59 MMHG | HEART RATE: 88 BPM | OXYGEN SATURATION: 95 % | WEIGHT: 184.75 LBS | SYSTOLIC BLOOD PRESSURE: 99 MMHG | RESPIRATION RATE: 16 BRPM

## 2022-01-01 VITALS
WEIGHT: 187.39 LBS | RESPIRATION RATE: 26 BRPM | SYSTOLIC BLOOD PRESSURE: 127 MMHG | OXYGEN SATURATION: 97 % | DIASTOLIC BLOOD PRESSURE: 94 MMHG | TEMPERATURE: 97.9 F | BODY MASS INDEX: 30.12 KG/M2 | HEIGHT: 66 IN | HEART RATE: 106 BPM

## 2022-01-01 VITALS
OXYGEN SATURATION: 99 % | BODY MASS INDEX: 31.53 KG/M2 | TEMPERATURE: 97.3 F | WEIGHT: 196.2 LBS | HEART RATE: 82 BPM | HEIGHT: 66 IN | DIASTOLIC BLOOD PRESSURE: 67 MMHG | SYSTOLIC BLOOD PRESSURE: 115 MMHG

## 2022-01-01 VITALS
BODY MASS INDEX: 29.73 KG/M2 | WEIGHT: 185 LBS | DIASTOLIC BLOOD PRESSURE: 60 MMHG | TEMPERATURE: 97.8 F | HEART RATE: 105 BPM | SYSTOLIC BLOOD PRESSURE: 90 MMHG | HEIGHT: 66 IN | RESPIRATION RATE: 19 BRPM | OXYGEN SATURATION: 92 %

## 2022-01-01 VITALS
DIASTOLIC BLOOD PRESSURE: 56 MMHG | HEIGHT: 66 IN | BODY MASS INDEX: 32.67 KG/M2 | TEMPERATURE: 99.7 F | RESPIRATION RATE: 18 BRPM | HEART RATE: 100 BPM | WEIGHT: 203.26 LBS | SYSTOLIC BLOOD PRESSURE: 95 MMHG | OXYGEN SATURATION: 94 %

## 2022-01-01 VITALS — HEART RATE: 89 BPM | WEIGHT: 191.58 LBS | BODY MASS INDEX: 30.79 KG/M2 | HEIGHT: 66 IN | TEMPERATURE: 99.2 F

## 2022-01-01 DIAGNOSIS — F17.210 NICOTINE DEPENDENCE, CIGARETTES, UNCOMPLICATED: ICD-10-CM

## 2022-01-01 DIAGNOSIS — R18.8 CIRRHOSIS OF LIVER WITH ASCITES, UNSPECIFIED HEPATIC CIRRHOSIS TYPE: ICD-10-CM

## 2022-01-01 DIAGNOSIS — E11.9 TYPE 2 DIABETES MELLITUS WITHOUT COMPLICATION, WITHOUT LONG-TERM CURRENT USE OF INSULIN: Primary | ICD-10-CM

## 2022-01-01 DIAGNOSIS — L65.0 TELOGEN EFFLUVIUM: ICD-10-CM

## 2022-01-01 DIAGNOSIS — R10.11 RIGHT UPPER QUADRANT ABDOMINAL PAIN: Primary | ICD-10-CM

## 2022-01-01 DIAGNOSIS — C78.7 LIVER METASTASIS: ICD-10-CM

## 2022-01-01 DIAGNOSIS — E78.5 HYPERLIPIDEMIA, UNSPECIFIED HYPERLIPIDEMIA TYPE: ICD-10-CM

## 2022-01-01 DIAGNOSIS — C79.51 BONE METASTASIS: ICD-10-CM

## 2022-01-01 DIAGNOSIS — C34.90 PRIMARY MALIGNANT NEOPLASM OF LUNG METASTATIC TO OTHER SITE, UNSPECIFIED LATERALITY: Primary | ICD-10-CM

## 2022-01-01 DIAGNOSIS — C34.01 MALIGNANT NEOPLASM OF HILUS OF RIGHT LUNG: Primary | ICD-10-CM

## 2022-01-01 DIAGNOSIS — R11.11 VOMITING WITHOUT NAUSEA, INTRACTABILITY OF VOMITING NOT SPECIFIED, UNSPECIFIED VOMITING TYPE: Primary | ICD-10-CM

## 2022-01-01 DIAGNOSIS — K74.60 CIRRHOSIS OF LIVER WITH ASCITES, UNSPECIFIED HEPATIC CIRRHOSIS TYPE: ICD-10-CM

## 2022-01-01 DIAGNOSIS — R26.2 DIFFICULTY WALKING: ICD-10-CM

## 2022-01-01 DIAGNOSIS — E86.0 DEHYDRATION: ICD-10-CM

## 2022-01-01 DIAGNOSIS — C34.01 MALIGNANT NEOPLASM OF HILUS OF RIGHT LUNG: ICD-10-CM

## 2022-01-01 DIAGNOSIS — R13.12 DYSPHAGIA, OROPHARYNGEAL: ICD-10-CM

## 2022-01-01 DIAGNOSIS — M76.822 POSTERIOR TIBIAL TENDINITIS OF LEFT LEG: ICD-10-CM

## 2022-01-01 DIAGNOSIS — Z72.0 TOBACCO ABUSE: ICD-10-CM

## 2022-01-01 DIAGNOSIS — R53.83 FATIGUE, UNSPECIFIED TYPE: ICD-10-CM

## 2022-01-01 DIAGNOSIS — E80.6 HYPERBILIRUBINEMIA: ICD-10-CM

## 2022-01-01 DIAGNOSIS — M87.876 OS NAVICULARE PEDIS MALACIA: ICD-10-CM

## 2022-01-01 DIAGNOSIS — I10 ESSENTIAL HYPERTENSION: ICD-10-CM

## 2022-01-01 DIAGNOSIS — R74.8 ELEVATED LIVER ENZYMES: ICD-10-CM

## 2022-01-01 DIAGNOSIS — Q74.2 ACCESSORY BONE OF FOOT: ICD-10-CM

## 2022-01-01 DIAGNOSIS — R06.09 DYSPNEA ON EXERTION: ICD-10-CM

## 2022-01-01 DIAGNOSIS — Z71.6 ENCOUNTER FOR SMOKING CESSATION COUNSELING: ICD-10-CM

## 2022-01-01 DIAGNOSIS — D72.829 LEUKOCYTOSIS, UNSPECIFIED TYPE: ICD-10-CM

## 2022-01-01 DIAGNOSIS — M79.672 LEFT FOOT PAIN: ICD-10-CM

## 2022-01-01 DIAGNOSIS — R17 ELEVATED BILIRUBIN: ICD-10-CM

## 2022-01-01 DIAGNOSIS — M79.672 FOOT PAIN, LEFT: Primary | ICD-10-CM

## 2022-01-01 LAB
ACB CMPLX DNA BAL NAA+NON-PRB-NCNCRNG: NOT DETECTED
ALBUMIN SERPL-MCNC: 2 G/DL (ref 3.5–5.2)
ALBUMIN SERPL-MCNC: 2.3 G/DL (ref 3.5–5.2)
ALBUMIN SERPL-MCNC: 2.5 G/DL (ref 3.5–5.2)
ALBUMIN SERPL-MCNC: 2.91 G/DL (ref 3.5–5.2)
ALBUMIN SERPL-MCNC: 3.1 G/DL (ref 3.5–5.2)
ALBUMIN SERPL-MCNC: 3.5 G/DL (ref 3.5–5.2)
ALBUMIN SERPL-MCNC: 3.9 G/DL (ref 3.5–5.2)
ALBUMIN SERPL-MCNC: 4 G/DL (ref 3.5–5.2)
ALBUMIN SERPL-MCNC: 4.3 G/DL (ref 3.5–5.2)
ALBUMIN/GLOB SERPL: 0.7 G/DL
ALBUMIN/GLOB SERPL: 0.8 G/DL
ALBUMIN/GLOB SERPL: 0.8 G/DL
ALBUMIN/GLOB SERPL: 0.9 G/DL
ALBUMIN/GLOB SERPL: 1 G/DL
ALBUMIN/GLOB SERPL: 1 G/DL
ALBUMIN/GLOB SERPL: 1.1 G/DL
ALBUMIN/GLOB SERPL: 1.3 G/DL
ALBUMIN/GLOB SERPL: 1.3 G/DL
ALP SERPL-CCNC: 106 U/L (ref 39–117)
ALP SERPL-CCNC: 119 U/L (ref 39–117)
ALP SERPL-CCNC: 325 U/L (ref 39–117)
ALP SERPL-CCNC: 385 U/L (ref 39–117)
ALP SERPL-CCNC: 412 U/L (ref 39–117)
ALP SERPL-CCNC: 420 U/L (ref 39–117)
ALP SERPL-CCNC: 422 U/L (ref 39–117)
ALP SERPL-CCNC: 488 U/L (ref 39–117)
ALP SERPL-CCNC: 506 U/L (ref 39–117)
ALP SERPL-CCNC: 597 U/L (ref 39–117)
ALP SERPL-CCNC: 92 U/L (ref 39–117)
ALPHA-FETOPROTEIN: <2 NG/ML (ref 0–8.3)
ALT SERPL W P-5'-P-CCNC: 104 U/L (ref 1–33)
ALT SERPL W P-5'-P-CCNC: 1098 U/L (ref 1–33)
ALT SERPL W P-5'-P-CCNC: 111 U/L (ref 1–33)
ALT SERPL W P-5'-P-CCNC: 121 U/L (ref 1–33)
ALT SERPL W P-5'-P-CCNC: 131 U/L (ref 1–33)
ALT SERPL W P-5'-P-CCNC: 20 U/L (ref 1–33)
ALT SERPL W P-5'-P-CCNC: 23 U/L (ref 1–33)
ALT SERPL W P-5'-P-CCNC: 40 U/L (ref 1–33)
ALT SERPL W P-5'-P-CCNC: 779 U/L (ref 1–33)
ALT SERPL W P-5'-P-CCNC: 860 U/L (ref 1–33)
ALT SERPL W P-5'-P-CCNC: 93 U/L (ref 1–33)
AMMONIA BLD-SCNC: 75 UMOL/L (ref 11–51)
AMMONIA BLD-SCNC: 77 UMOL/L (ref 11–51)
ANION GAP SERPL CALCULATED.3IONS-SCNC: 12.6 MMOL/L (ref 5–15)
ANION GAP SERPL CALCULATED.3IONS-SCNC: 12.7 MMOL/L (ref 5–15)
ANION GAP SERPL CALCULATED.3IONS-SCNC: 13.2 MMOL/L (ref 5–15)
ANION GAP SERPL CALCULATED.3IONS-SCNC: 13.4 MMOL/L (ref 5–15)
ANION GAP SERPL CALCULATED.3IONS-SCNC: 14 MMOL/L (ref 5–15)
ANION GAP SERPL CALCULATED.3IONS-SCNC: 15.3 MMOL/L (ref 5–15)
ANION GAP SERPL CALCULATED.3IONS-SCNC: 17.6 MMOL/L (ref 5–15)
ANION GAP SERPL CALCULATED.3IONS-SCNC: 18.6 MMOL/L (ref 5–15)
ANION GAP SERPL CALCULATED.3IONS-SCNC: 19.4 MMOL/L (ref 5–15)
ANION GAP SERPL CALCULATED.3IONS-SCNC: 19.8 MMOL/L (ref 5–15)
ANION GAP SERPL CALCULATED.3IONS-SCNC: 20.1 MMOL/L (ref 5–15)
ANION GAP SERPL CALCULATED.3IONS-SCNC: 21 MMOL/L (ref 5–15)
ANION GAP SERPL CALCULATED.3IONS-SCNC: 30.2 MMOL/L (ref 5–15)
ANISOCYTOSIS BLD QL: ABNORMAL
ARTERIAL PATENCY WRIST A: POSITIVE
AST SERPL-CCNC: 156 U/L (ref 1–32)
AST SERPL-CCNC: 189 U/L (ref 1–32)
AST SERPL-CCNC: 260 U/L (ref 1–32)
AST SERPL-CCNC: 29 U/L (ref 1–32)
AST SERPL-CCNC: 297 U/L (ref 1–32)
AST SERPL-CCNC: 3128 U/L (ref 1–32)
AST SERPL-CCNC: 3496 U/L (ref 1–32)
AST SERPL-CCNC: 35 U/L (ref 1–32)
AST SERPL-CCNC: 416 U/L (ref 1–32)
AST SERPL-CCNC: 4462 U/L (ref 1–32)
AST SERPL-CCNC: 45 U/L (ref 1–32)
BACTERIA SPEC AEROBE CULT: NORMAL
BACTERIA SPEC AEROBE CULT: NORMAL
BACTERIA UR QL AUTO: ABNORMAL /HPF
BACTERIA UR QL AUTO: ABNORMAL /HPF
BASE EXCESS BLDA CALC-SCNC: -1.6 MMOL/L (ref -2–2)
BASE EXCESS BLDA CALC-SCNC: -11.8 MMOL/L (ref -2–2)
BASE EXCESS BLDA CALC-SCNC: -11.9 MMOL/L (ref -2–2)
BASE EXCESS BLDA CALC-SCNC: -17.1 MMOL/L (ref -2–2)
BASE EXCESS BLDA CALC-SCNC: -8 MMOL/L (ref -2–2)
BASOPHILS # BLD AUTO: 0.02 10*3/MM3 (ref 0–0.2)
BASOPHILS # BLD AUTO: 0.02 10*3/MM3 (ref 0–0.2)
BASOPHILS # BLD AUTO: 0.03 10*3/MM3 (ref 0–0.2)
BASOPHILS # BLD AUTO: 0.04 10*3/MM3 (ref 0–0.2)
BASOPHILS # BLD AUTO: 0.05 10*3/MM3 (ref 0–0.2)
BASOPHILS # BLD AUTO: 0.05 10*3/MM3 (ref 0–0.2)
BASOPHILS # BLD AUTO: 0.06 10*3/MM3 (ref 0–0.2)
BASOPHILS # BLD AUTO: 0.07 10*3/MM3 (ref 0–0.2)
BASOPHILS # BLD AUTO: 0.08 10*3/MM3 (ref 0–0.2)
BASOPHILS NFR BLD AUTO: 0.1 % (ref 0–1.5)
BASOPHILS NFR BLD AUTO: 0.2 % (ref 0–1.5)
BASOPHILS NFR BLD AUTO: 0.2 % (ref 0–1.5)
BASOPHILS NFR BLD AUTO: 0.3 % (ref 0–1.5)
BASOPHILS NFR BLD AUTO: 0.4 % (ref 0–1.5)
BASOPHILS NFR BLD AUTO: 0.6 % (ref 0–1.5)
BASOPHILS NFR BLD AUTO: 0.6 % (ref 0–1.5)
BDY SITE: ABNORMAL
BILIRUB SERPL-MCNC: 0.2 MG/DL (ref 0–1.2)
BILIRUB SERPL-MCNC: 0.3 MG/DL (ref 0–1.2)
BILIRUB SERPL-MCNC: 0.5 MG/DL (ref 0–1.2)
BILIRUB SERPL-MCNC: 11.8 MG/DL (ref 0–1.2)
BILIRUB SERPL-MCNC: 12.7 MG/DL (ref 0–1.2)
BILIRUB SERPL-MCNC: 12.8 MG/DL (ref 0–1.2)
BILIRUB SERPL-MCNC: 12.9 MG/DL (ref 0–1.2)
BILIRUB SERPL-MCNC: 13.5 MG/DL (ref 0–1.2)
BILIRUB SERPL-MCNC: 2.5 MG/DL (ref 0–1.2)
BILIRUB SERPL-MCNC: 2.9 MG/DL (ref 0–1.2)
BILIRUB SERPL-MCNC: 5.6 MG/DL (ref 0–1.2)
BILIRUB UR QL STRIP: ABNORMAL
BILIRUB UR QL STRIP: ABNORMAL
BILIRUB UR QL STRIP: NEGATIVE
BLACTX-M ISLT/SPM QL: NORMAL
BLAIMP ISLT/SPM QL: NORMAL
BLAKPC ISLT/SPM QL: NORMAL
BLAOXA-48-LIKE ISLT/SPM QL: NORMAL
BLAVIM ISLT/SPM QL: NORMAL
BUN SERPL-MCNC: 15 MG/DL (ref 8–23)
BUN SERPL-MCNC: 15 MG/DL (ref 8–23)
BUN SERPL-MCNC: 16 MG/DL (ref 8–23)
BUN SERPL-MCNC: 17 MG/DL (ref 8–23)
BUN SERPL-MCNC: 24 MG/DL (ref 8–23)
BUN SERPL-MCNC: 27 MG/DL (ref 8–23)
BUN SERPL-MCNC: 27 MG/DL (ref 8–23)
BUN SERPL-MCNC: 35 MG/DL (ref 8–23)
BUN SERPL-MCNC: 37 MG/DL (ref 8–23)
BUN SERPL-MCNC: 49 MG/DL (ref 8–23)
BUN SERPL-MCNC: 51 MG/DL (ref 8–23)
BUN SERPL-MCNC: 55 MG/DL (ref 8–23)
BUN SERPL-MCNC: 65 MG/DL (ref 8–23)
BUN/CREAT SERPL: 14 (ref 7–25)
BUN/CREAT SERPL: 15.5 (ref 7–25)
BUN/CREAT SERPL: 18.7 (ref 7–25)
BUN/CREAT SERPL: 21.1 (ref 7–25)
BUN/CREAT SERPL: 22.9 (ref 7–25)
BUN/CREAT SERPL: 23.3 (ref 7–25)
BUN/CREAT SERPL: 24.2 (ref 7–25)
BUN/CREAT SERPL: 26 (ref 7–25)
BUN/CREAT SERPL: 27.1 (ref 7–25)
BUN/CREAT SERPL: 28.5 (ref 7–25)
BUN/CREAT SERPL: 29.1 (ref 7–25)
BUN/CREAT SERPL: 32.5 (ref 7–25)
BUN/CREAT SERPL: 34 (ref 7–25)
C PNEUM DNA NPH QL NAA+NON-PROBE: NOT DETECTED
CA-I BLDA-SCNC: 1.07 MMOL/L (ref 1.13–1.32)
CA-I BLDA-SCNC: 1.13 MMOL/L (ref 1.13–1.32)
CA-I BLDA-SCNC: 1.17 MMOL/L (ref 1.13–1.32)
CA-I BLDA-SCNC: 1.19 MMOL/L (ref 1.13–1.32)
CALCIUM SPEC-SCNC: 10 MG/DL (ref 8.6–10.5)
CALCIUM SPEC-SCNC: 10.1 MG/DL (ref 8.6–10.5)
CALCIUM SPEC-SCNC: 10.2 MG/DL (ref 8.6–10.5)
CALCIUM SPEC-SCNC: 10.3 MG/DL (ref 8.6–10.5)
CALCIUM SPEC-SCNC: 6.5 MG/DL (ref 8.6–10.5)
CALCIUM SPEC-SCNC: 7.3 MG/DL (ref 8.6–10.5)
CALCIUM SPEC-SCNC: 7.4 MG/DL (ref 8.6–10.5)
CALCIUM SPEC-SCNC: 7.7 MG/DL (ref 8.6–10.5)
CALCIUM SPEC-SCNC: 9 MG/DL (ref 8.6–10.5)
CALCIUM SPEC-SCNC: 9.3 MG/DL (ref 8.6–10.5)
CALCIUM SPEC-SCNC: 9.3 MG/DL (ref 8.6–10.5)
CALCIUM SPEC-SCNC: 9.4 MG/DL (ref 8.6–10.5)
CALCIUM SPEC-SCNC: 9.8 MG/DL (ref 8.6–10.5)
CANCER AG19-9 SERPL-ACNC: 228 U/ML
CEA SERPL-MCNC: 6.88 NG/ML
CHLORIDE BLDA-SCNC: 100 MMOL/L (ref 98–106)
CHLORIDE BLDA-SCNC: 97 MMOL/L (ref 98–106)
CHLORIDE BLDA-SCNC: 97 MMOL/L (ref 98–106)
CHLORIDE BLDA-SCNC: 98 MMOL/L (ref 98–106)
CHLORIDE SERPL-SCNC: 101 MMOL/L (ref 98–107)
CHLORIDE SERPL-SCNC: 88 MMOL/L (ref 98–107)
CHLORIDE SERPL-SCNC: 89 MMOL/L (ref 98–107)
CHLORIDE SERPL-SCNC: 90 MMOL/L (ref 98–107)
CHLORIDE SERPL-SCNC: 90 MMOL/L (ref 98–107)
CHLORIDE SERPL-SCNC: 91 MMOL/L (ref 98–107)
CHLORIDE SERPL-SCNC: 92 MMOL/L (ref 98–107)
CHLORIDE SERPL-SCNC: 93 MMOL/L (ref 98–107)
CHLORIDE SERPL-SCNC: 95 MMOL/L (ref 98–107)
CHLORIDE SERPL-SCNC: 99 MMOL/L (ref 98–107)
CHOLEST SERPL-MCNC: 198 MG/DL (ref 0–200)
CHOLEST SERPL-MCNC: 229 MG/DL (ref 0–200)
CLARITY UR: CLEAR
CO2 SERPL-SCNC: 13.8 MMOL/L (ref 22–29)
CO2 SERPL-SCNC: 14.4 MMOL/L (ref 22–29)
CO2 SERPL-SCNC: 15 MMOL/L (ref 22–29)
CO2 SERPL-SCNC: 16.2 MMOL/L (ref 22–29)
CO2 SERPL-SCNC: 19.3 MMOL/L (ref 22–29)
CO2 SERPL-SCNC: 19.4 MMOL/L (ref 22–29)
CO2 SERPL-SCNC: 20.6 MMOL/L (ref 22–29)
CO2 SERPL-SCNC: 20.8 MMOL/L (ref 22–29)
CO2 SERPL-SCNC: 21.4 MMOL/L (ref 22–29)
CO2 SERPL-SCNC: 22 MMOL/L (ref 22–29)
CO2 SERPL-SCNC: 22.6 MMOL/L (ref 22–29)
CO2 SERPL-SCNC: 22.9 MMOL/L (ref 22–29)
CO2 SERPL-SCNC: 26.7 MMOL/L (ref 22–29)
COHGB MFR BLD: 0.1 % (ref 0–1.5)
COHGB MFR BLD: 0.3 % (ref 0–1.5)
COHGB MFR BLD: 0.3 % (ref 0–1.5)
COHGB MFR BLD: 0.4 % (ref 0–1.5)
COHGB MFR BLD: 0.5 % (ref 0–1.5)
COLOR UR: ABNORMAL
COLOR UR: ABNORMAL
COLOR UR: YELLOW
CREAT SERPL-MCNC: 0.76 MG/DL (ref 0.57–1)
CREAT SERPL-MCNC: 0.91 MG/DL (ref 0.57–1)
CREAT SERPL-MCNC: 0.97 MG/DL (ref 0.57–1)
CREAT SERPL-MCNC: 1.04 MG/DL (ref 0.57–1)
CREAT SERPL-MCNC: 1.05 MG/DL (ref 0.57–1)
CREAT SERPL-MCNC: 1.07 MG/DL (ref 0.57–1)
CREAT SERPL-MCNC: 1.16 MG/DL (ref 0.57–1)
CREAT SERPL-MCNC: 1.27 MG/DL (ref 0.57–1)
CREAT SERPL-MCNC: 1.29 MG/DL (ref 0.57–1)
CREAT SERPL-MCNC: 1.44 MG/DL (ref 0.57–1)
CREAT SERPL-MCNC: 1.57 MG/DL (ref 0.57–1)
CREAT SERPL-MCNC: 1.93 MG/DL (ref 0.57–1)
CREAT SERPL-MCNC: 2.69 MG/DL (ref 0.57–1)
CYTO UR: NORMAL
D-LACTATE SERPL-SCNC: 1.9 MMOL/L (ref 0.5–2)
D-LACTATE SERPL-SCNC: 11.8 MMOL/L (ref 0.5–2)
D-LACTATE SERPL-SCNC: 3.8 MMOL/L (ref 0.5–2)
D-LACTATE SERPL-SCNC: 3.9 MMOL/L (ref 0.5–2)
D-LACTATE SERPL-SCNC: 4 MMOL/L (ref 0.5–2)
D-LACTATE SERPL-SCNC: 4.4 MMOL/L (ref 0.5–2)
D-LACTATE SERPL-SCNC: 4.9 MMOL/L (ref 0.5–2)
D-LACTATE SERPL-SCNC: 5.2 MMOL/L (ref 0.5–2)
D-LACTATE SERPL-SCNC: 5.3 MMOL/L (ref 0.5–2)
D-LACTATE SERPL-SCNC: 5.8 MMOL/L (ref 0.5–2)
D-LACTATE SERPL-SCNC: 8.3 MMOL/L (ref 0.5–2)
D-LACTATE SERPL-SCNC: 8.4 MMOL/L (ref 0.5–2)
D-LACTATE SERPL-SCNC: 8.5 MMOL/L (ref 0.5–2)
D-LACTATE SERPL-SCNC: 8.8 MMOL/L (ref 0.5–2)
D-LACTATE SERPL-SCNC: 9.8 MMOL/L (ref 0.5–2)
DEPRECATED RDW RBC AUTO: 40.1 FL (ref 37–54)
DEPRECATED RDW RBC AUTO: 41.6 FL (ref 37–54)
DEPRECATED RDW RBC AUTO: 42.2 FL (ref 37–54)
DEPRECATED RDW RBC AUTO: 48.5 FL (ref 37–54)
DEPRECATED RDW RBC AUTO: 48.8 FL (ref 37–54)
DEPRECATED RDW RBC AUTO: 57.2 FL (ref 37–54)
DEPRECATED RDW RBC AUTO: 63.9 FL (ref 37–54)
DEPRECATED RDW RBC AUTO: 65.6 FL (ref 37–54)
DEPRECATED RDW RBC AUTO: 70.8 FL (ref 37–54)
DEPRECATED RDW RBC AUTO: 73.7 FL (ref 37–54)
DEPRECATED RDW RBC AUTO: 74.4 FL (ref 37–54)
E CLOAC COMP DNA BAL NAA+NON-PRB-NCNCRNG: NOT DETECTED
E COLI DNA BAL NAA+NON-PRB-NCNCRNG: NOT DETECTED
EGFRCR SERPLBLD CKD-EPI 2021: 19.2 ML/MIN/1.73
EGFRCR SERPLBLD CKD-EPI 2021: 28.6 ML/MIN/1.73
EGFRCR SERPLBLD CKD-EPI 2021: 36.7 ML/MIN/1.73
EGFRCR SERPLBLD CKD-EPI 2021: 40.7 ML/MIN/1.73
EGFRCR SERPLBLD CKD-EPI 2021: 46.4 ML/MIN/1.73
EGFRCR SERPLBLD CKD-EPI 2021: 47.3 ML/MIN/1.73
EGFRCR SERPLBLD CKD-EPI 2021: 52.8 ML/MIN/1.73
EGFRCR SERPLBLD CKD-EPI 2021: 58.1 ML/MIN/1.73
EGFRCR SERPLBLD CKD-EPI 2021: 60.1 ML/MIN/1.73
EGFRCR SERPLBLD CKD-EPI 2021: 65.4 ML/MIN/1.73
EGFRCR SERPLBLD CKD-EPI 2021: 70.6 ML/MIN/1.73
EGFRCR SERPLBLD CKD-EPI 2021: 88.2 ML/MIN/1.73
EOSINOPHIL # BLD AUTO: 0.01 10*3/MM3 (ref 0–0.4)
EOSINOPHIL # BLD AUTO: 0.02 10*3/MM3 (ref 0–0.4)
EOSINOPHIL # BLD AUTO: 0.02 10*3/MM3 (ref 0–0.4)
EOSINOPHIL # BLD AUTO: 0.04 10*3/MM3 (ref 0–0.4)
EOSINOPHIL # BLD AUTO: 0.05 10*3/MM3 (ref 0–0.4)
EOSINOPHIL # BLD AUTO: 0.1 10*3/MM3 (ref 0–0.4)
EOSINOPHIL # BLD AUTO: 0.19 10*3/MM3 (ref 0–0.4)
EOSINOPHIL # BLD AUTO: 0.24 10*3/MM3 (ref 0–0.4)
EOSINOPHIL # BLD AUTO: 0.25 10*3/MM3 (ref 0–0.4)
EOSINOPHIL NFR BLD AUTO: 0.1 % (ref 0.3–6.2)
EOSINOPHIL NFR BLD AUTO: 0.1 % (ref 0.3–6.2)
EOSINOPHIL NFR BLD AUTO: 0.2 % (ref 0.3–6.2)
EOSINOPHIL NFR BLD AUTO: 0.3 % (ref 0.3–6.2)
EOSINOPHIL NFR BLD AUTO: 0.4 % (ref 0.3–6.2)
EOSINOPHIL NFR BLD AUTO: 0.6 % (ref 0.3–6.2)
EOSINOPHIL NFR BLD AUTO: 1.4 % (ref 0.3–6.2)
EOSINOPHIL NFR BLD AUTO: 2 % (ref 0.3–6.2)
EOSINOPHIL NFR BLD AUTO: 2.1 % (ref 0.3–6.2)
EOSINOPHIL NFR FLD MANUAL: 1 %
ERYTHROCYTE [DISTWIDTH] IN BLOOD BY AUTOMATED COUNT: 12.3 % (ref 12.3–15.4)
ERYTHROCYTE [DISTWIDTH] IN BLOOD BY AUTOMATED COUNT: 12.9 % (ref 12.3–15.4)
ERYTHROCYTE [DISTWIDTH] IN BLOOD BY AUTOMATED COUNT: 12.9 % (ref 12.3–15.4)
ERYTHROCYTE [DISTWIDTH] IN BLOOD BY AUTOMATED COUNT: 14.7 % (ref 12.3–15.4)
ERYTHROCYTE [DISTWIDTH] IN BLOOD BY AUTOMATED COUNT: 15 % (ref 12.3–15.4)
ERYTHROCYTE [DISTWIDTH] IN BLOOD BY AUTOMATED COUNT: 18.1 % (ref 12.3–15.4)
ERYTHROCYTE [DISTWIDTH] IN BLOOD BY AUTOMATED COUNT: 20.1 % (ref 12.3–15.4)
ERYTHROCYTE [DISTWIDTH] IN BLOOD BY AUTOMATED COUNT: 20.5 % (ref 12.3–15.4)
ERYTHROCYTE [DISTWIDTH] IN BLOOD BY AUTOMATED COUNT: 20.6 % (ref 12.3–15.4)
ERYTHROCYTE [DISTWIDTH] IN BLOOD BY AUTOMATED COUNT: 20.7 % (ref 12.3–15.4)
ERYTHROCYTE [DISTWIDTH] IN BLOOD BY AUTOMATED COUNT: 21.1 % (ref 12.3–15.4)
FHHB: 2.5 % (ref 0–5)
FHHB: 4.3 % (ref 0–5)
FHHB: 5.9 % (ref 0–5)
FHHB: 6.6 % (ref 0–5)
FHHB: 7 % (ref 0–5)
FLUAV AG NPH QL: NEGATIVE
FLUAV SUBTYP SPEC NAA+PROBE: NOT DETECTED
FLUBV AG NPH QL IA: NEGATIVE
FLUBV RNA ISLT QL NAA+PROBE: NOT DETECTED
GAS FLOW AIRWAY: 15 LPM
GAS FLOW AIRWAY: 60 LPM
GFR SERPL CREATININE-BSD FRML MDRD: 53 ML/MIN/1.73
GLOBULIN UR ELPH-MCNC: 2.2 GM/DL
GLOBULIN UR ELPH-MCNC: 2.2 GM/DL
GLOBULIN UR ELPH-MCNC: 2.5 GM/DL
GLOBULIN UR ELPH-MCNC: 2.8 GM/DL
GLOBULIN UR ELPH-MCNC: 3 GM/DL
GLOBULIN UR ELPH-MCNC: 3.2 GM/DL
GLOBULIN UR ELPH-MCNC: 3.2 GM/DL
GLOBULIN UR ELPH-MCNC: 3.4 GM/DL
GLOBULIN UR ELPH-MCNC: 3.4 GM/DL
GLOBULIN UR ELPH-MCNC: 3.7 GM/DL
GLOBULIN UR ELPH-MCNC: 3.8 GM/DL
GLUCOSE BLDA-MCNC: 123 MMOL/L (ref 65–99)
GLUCOSE BLDA-MCNC: 152 MMOL/L (ref 65–99)
GLUCOSE BLDA-MCNC: 188 MMOL/L (ref 65–99)
GLUCOSE BLDA-MCNC: 202 MMOL/L (ref 65–99)
GLUCOSE BLDC GLUCOMTR-MCNC: 104 MG/DL (ref 70–99)
GLUCOSE BLDC GLUCOMTR-MCNC: 106 MG/DL (ref 70–99)
GLUCOSE BLDC GLUCOMTR-MCNC: 108 MG/DL (ref 70–99)
GLUCOSE BLDC GLUCOMTR-MCNC: 123 MG/DL (ref 70–99)
GLUCOSE BLDC GLUCOMTR-MCNC: 124 MG/DL (ref 70–99)
GLUCOSE BLDC GLUCOMTR-MCNC: 132 MG/DL (ref 70–99)
GLUCOSE BLDC GLUCOMTR-MCNC: 140 MG/DL (ref 70–99)
GLUCOSE BLDC GLUCOMTR-MCNC: 142 MG/DL (ref 70–99)
GLUCOSE BLDC GLUCOMTR-MCNC: 147 MG/DL (ref 70–99)
GLUCOSE BLDC GLUCOMTR-MCNC: 151 MG/DL (ref 70–99)
GLUCOSE BLDC GLUCOMTR-MCNC: 151 MG/DL (ref 70–99)
GLUCOSE BLDC GLUCOMTR-MCNC: 158 MG/DL (ref 70–99)
GLUCOSE BLDC GLUCOMTR-MCNC: 183 MG/DL (ref 70–99)
GLUCOSE BLDC GLUCOMTR-MCNC: 186 MG/DL (ref 70–99)
GLUCOSE BLDC GLUCOMTR-MCNC: 214 MG/DL (ref 70–99)
GLUCOSE BLDC GLUCOMTR-MCNC: 218 MG/DL (ref 70–99)
GLUCOSE BLDC GLUCOMTR-MCNC: 223 MG/DL (ref 70–99)
GLUCOSE BLDC GLUCOMTR-MCNC: 231 MG/DL (ref 70–99)
GLUCOSE BLDC GLUCOMTR-MCNC: 262 MG/DL (ref 70–99)
GLUCOSE BLDC GLUCOMTR-MCNC: 65 MG/DL (ref 70–99)
GLUCOSE BLDC GLUCOMTR-MCNC: 66 MG/DL (ref 70–99)
GLUCOSE BLDC GLUCOMTR-MCNC: 75 MG/DL (ref 70–99)
GLUCOSE BLDC GLUCOMTR-MCNC: 77 MG/DL (ref 70–99)
GLUCOSE BLDC GLUCOMTR-MCNC: 77 MG/DL (ref 70–99)
GLUCOSE BLDC GLUCOMTR-MCNC: 79 MG/DL (ref 70–99)
GLUCOSE BLDC GLUCOMTR-MCNC: 80 MG/DL (ref 70–99)
GLUCOSE BLDC GLUCOMTR-MCNC: 80 MG/DL (ref 70–99)
GLUCOSE BLDC GLUCOMTR-MCNC: 83 MG/DL (ref 70–99)
GLUCOSE BLDC GLUCOMTR-MCNC: 87 MG/DL (ref 70–99)
GLUCOSE BLDC GLUCOMTR-MCNC: 87 MG/DL (ref 70–99)
GLUCOSE BLDC GLUCOMTR-MCNC: 88 MG/DL (ref 70–99)
GLUCOSE BLDC GLUCOMTR-MCNC: 89 MG/DL (ref 70–99)
GLUCOSE BLDC GLUCOMTR-MCNC: 90 MG/DL (ref 70–99)
GLUCOSE BLDC GLUCOMTR-MCNC: 91 MG/DL (ref 70–99)
GLUCOSE BLDC GLUCOMTR-MCNC: 92 MG/DL (ref 70–99)
GLUCOSE BLDC GLUCOMTR-MCNC: 92 MG/DL (ref 70–99)
GLUCOSE BLDC GLUCOMTR-MCNC: 94 MG/DL (ref 70–99)
GLUCOSE BLDC GLUCOMTR-MCNC: 94 MG/DL (ref 70–99)
GLUCOSE BLDC GLUCOMTR-MCNC: 96 MG/DL (ref 70–99)
GLUCOSE BLDC GLUCOMTR-MCNC: 98 MG/DL (ref 70–99)
GLUCOSE SERPL-MCNC: 102 MG/DL (ref 65–99)
GLUCOSE SERPL-MCNC: 103 MG/DL (ref 65–99)
GLUCOSE SERPL-MCNC: 103 MG/DL (ref 65–99)
GLUCOSE SERPL-MCNC: 111 MG/DL (ref 65–99)
GLUCOSE SERPL-MCNC: 114 MG/DL (ref 65–99)
GLUCOSE SERPL-MCNC: 126 MG/DL (ref 65–99)
GLUCOSE SERPL-MCNC: 127 MG/DL (ref 65–99)
GLUCOSE SERPL-MCNC: 157 MG/DL (ref 65–99)
GLUCOSE SERPL-MCNC: 185 MG/DL (ref 65–99)
GLUCOSE SERPL-MCNC: 240 MG/DL (ref 65–99)
GLUCOSE SERPL-MCNC: 256 MG/DL (ref 65–99)
GLUCOSE SERPL-MCNC: 79 MG/DL (ref 65–99)
GLUCOSE SERPL-MCNC: 90 MG/DL (ref 65–99)
GLUCOSE UR STRIP-MCNC: NEGATIVE MG/DL
GP B STREP DNA BAL NAA+NON-PRB-NCNCRNG: NOT DETECTED
GRAM STN SPEC: NORMAL
HADV DNA SPEC NAA+PROBE: NOT DETECTED
HAEM INFLU DNA BAL NAA+NON-PRB-NCNCRNG: NOT DETECTED
HBA1C MFR BLD: 6.9 % (ref 4.8–5.6)
HBA1C MFR BLD: 7.1 % (ref 4.8–5.6)
HCO3 BLDA-SCNC: 11.3 MMOL/L (ref 22–26)
HCO3 BLDA-SCNC: 18.4 MMOL/L (ref 22–26)
HCO3 BLDA-SCNC: 19.5 MMOL/L (ref 22–26)
HCO3 BLDA-SCNC: 20.6 MMOL/L (ref 22–26)
HCO3 BLDA-SCNC: 25.3 MMOL/L (ref 22–26)
HCOV RNA LOWER RESP QL NAA+NON-PROBE: NOT DETECTED
HCT VFR BLD AUTO: 39.4 % (ref 34–46.6)
HCT VFR BLD AUTO: 41.1 % (ref 34–46.6)
HCT VFR BLD AUTO: 42.6 % (ref 34–46.6)
HCT VFR BLD AUTO: 43.2 % (ref 34–46.6)
HCT VFR BLD AUTO: 43.3 % (ref 34–46.6)
HCT VFR BLD AUTO: 43.4 % (ref 34–46.6)
HCT VFR BLD AUTO: 44.9 % (ref 34–46.6)
HCT VFR BLD AUTO: 44.9 % (ref 34–46.6)
HCT VFR BLD AUTO: 45.5 % (ref 34–46.6)
HCT VFR BLD AUTO: 47.3 % (ref 34–46.6)
HCT VFR BLD AUTO: 50.5 % (ref 34–46.6)
HDLC SERPL-MCNC: 38 MG/DL (ref 40–60)
HDLC SERPL-MCNC: 46 MG/DL (ref 40–60)
HGB BLD-MCNC: 12.9 G/DL (ref 12–15.9)
HGB BLD-MCNC: 13.7 G/DL (ref 12–15.9)
HGB BLD-MCNC: 14.3 G/DL (ref 12–15.9)
HGB BLD-MCNC: 15 G/DL (ref 12–15.9)
HGB BLD-MCNC: 15.3 G/DL (ref 12–15.9)
HGB BLD-MCNC: 15.4 G/DL (ref 12–15.9)
HGB BLD-MCNC: 15.8 G/DL (ref 12–15.9)
HGB BLD-MCNC: 15.8 G/DL (ref 12–15.9)
HGB BLD-MCNC: 17 G/DL (ref 12–15.9)
HGB BLDA-MCNC: 13.4 G/DL (ref 11.7–14.6)
HGB BLDA-MCNC: 13.8 G/DL (ref 11.7–14.6)
HGB BLDA-MCNC: 14 G/DL (ref 11.7–14.6)
HGB BLDA-MCNC: 14.8 G/DL (ref 11.7–14.6)
HGB BLDA-MCNC: 15.1 G/DL (ref 11.7–14.6)
HGB UR QL STRIP.AUTO: NEGATIVE
HMPV RNA NPH QL NAA+NON-PROBE: NOT DETECTED
HOLD SPECIMEN: NORMAL
HPIV RNA LOWER RESP QL NAA+NON-PROBE: NOT DETECTED
HYALINE CASTS UR QL AUTO: ABNORMAL /LPF
HYALINE CASTS UR QL AUTO: ABNORMAL /LPF
IMM GRANULOCYTES # BLD AUTO: 0.03 10*3/MM3 (ref 0–0.05)
IMM GRANULOCYTES # BLD AUTO: 0.04 10*3/MM3 (ref 0–0.05)
IMM GRANULOCYTES # BLD AUTO: 0.08 10*3/MM3 (ref 0–0.05)
IMM GRANULOCYTES # BLD AUTO: 0.08 10*3/MM3 (ref 0–0.05)
IMM GRANULOCYTES # BLD AUTO: 0.09 10*3/MM3 (ref 0–0.05)
IMM GRANULOCYTES # BLD AUTO: 0.1 10*3/MM3 (ref 0–0.05)
IMM GRANULOCYTES # BLD AUTO: 0.16 10*3/MM3 (ref 0–0.05)
IMM GRANULOCYTES # BLD AUTO: 0.16 10*3/MM3 (ref 0–0.05)
IMM GRANULOCYTES # BLD AUTO: 0.71 10*3/MM3 (ref 0–0.05)
IMM GRANULOCYTES NFR BLD AUTO: 0.3 % (ref 0–0.5)
IMM GRANULOCYTES NFR BLD AUTO: 0.5 % (ref 0–0.5)
IMM GRANULOCYTES NFR BLD AUTO: 0.5 % (ref 0–0.5)
IMM GRANULOCYTES NFR BLD AUTO: 0.6 % (ref 0–0.5)
IMM GRANULOCYTES NFR BLD AUTO: 0.7 % (ref 0–0.5)
IMM GRANULOCYTES NFR BLD AUTO: 0.8 % (ref 0–0.5)
IMM GRANULOCYTES NFR BLD AUTO: 0.8 % (ref 0–0.5)
IMM GRANULOCYTES NFR BLD AUTO: 0.9 % (ref 0–0.5)
IMM GRANULOCYTES NFR BLD AUTO: 5.2 % (ref 0–0.5)
INHALED O2 CONCENTRATION: 100 %
INHALED O2 CONCENTRATION: 85 %
INHALED O2 CONCENTRATION: 90 %
INR PPP: 1.08 (ref 0.86–1.15)
INR PPP: 2.42 (ref 0.86–1.15)
K AEROGENES DNA BAL NAA+NON-PRB-NCNCRNG: NOT DETECTED
K OXYTOCA DNA BAL NAA+NON-PRB-NCNCRNG: NOT DETECTED
K PNEU GRP DNA BAL NAA+NON-PRB-NCNCRNG: NOT DETECTED
KETONES UR QL STRIP: ABNORMAL
L PNEUMO DNA LOWER RESP QL NAA+NON-PROBE: NOT DETECTED
L PNEUMO1 AG UR QL IA: NEGATIVE
LAB AP CASE REPORT: NORMAL
LAB AP CLINICAL INFORMATION: NORMAL
LAB AP SPECIAL STAINS: NORMAL
LACTATE BLDA-SCNC: 7.55 MMOL/L (ref 0.5–2)
LACTATE BLDA-SCNC: 7.63 MMOL/L (ref 0.5–2)
LACTATE BLDA-SCNC: 7.71 MMOL/L (ref 0.5–2)
LACTATE BLDA-SCNC: 9.46 MMOL/L (ref 0.5–2)
LDH SERPL-CCNC: 277 U/L (ref 135–214)
LDLC SERPL CALC-MCNC: 124 MG/DL (ref 0–100)
LDLC SERPL CALC-MCNC: 148 MG/DL (ref 0–100)
LDLC/HDLC SERPL: 2.61 {RATIO}
LDLC/HDLC SERPL: 3.78 {RATIO}
LEUKOCYTE ESTERASE UR QL STRIP.AUTO: ABNORMAL
LEUKOCYTE ESTERASE UR QL STRIP.AUTO: ABNORMAL
LEUKOCYTE ESTERASE UR QL STRIP.AUTO: NEGATIVE
LIPASE SERPL-CCNC: 117 U/L (ref 13–60)
LIPASE SERPL-CCNC: 31 U/L (ref 13–60)
LIPASE SERPL-CCNC: 80 U/L (ref 13–60)
LYMPHOCYTES # BLD AUTO: 0.72 10*3/MM3 (ref 0.7–3.1)
LYMPHOCYTES # BLD AUTO: 1.1 10*3/MM3 (ref 0.7–3.1)
LYMPHOCYTES # BLD AUTO: 1.31 10*3/MM3 (ref 0.7–3.1)
LYMPHOCYTES # BLD AUTO: 1.75 10*3/MM3 (ref 0.7–3.1)
LYMPHOCYTES # BLD AUTO: 1.89 10*3/MM3 (ref 0.7–3.1)
LYMPHOCYTES # BLD AUTO: 2 10*3/MM3 (ref 0.7–3.1)
LYMPHOCYTES # BLD AUTO: 2.29 10*3/MM3 (ref 0.7–3.1)
LYMPHOCYTES # BLD AUTO: 3.06 10*3/MM3 (ref 0.7–3.1)
LYMPHOCYTES # BLD AUTO: 3.13 10*3/MM3 (ref 0.7–3.1)
LYMPHOCYTES # BLD MANUAL: 0.33 10*3/MM3 (ref 0.7–3.1)
LYMPHOCYTES NFR BLD AUTO: 13.1 % (ref 19.6–45.3)
LYMPHOCYTES NFR BLD AUTO: 13.6 % (ref 19.6–45.3)
LYMPHOCYTES NFR BLD AUTO: 15.4 % (ref 19.6–45.3)
LYMPHOCYTES NFR BLD AUTO: 25.1 % (ref 19.6–45.3)
LYMPHOCYTES NFR BLD AUTO: 26.9 % (ref 19.6–45.3)
LYMPHOCYTES NFR BLD AUTO: 27.6 % (ref 19.6–45.3)
LYMPHOCYTES NFR BLD AUTO: 5.3 % (ref 19.6–45.3)
LYMPHOCYTES NFR BLD AUTO: 5.8 % (ref 19.6–45.3)
LYMPHOCYTES NFR BLD AUTO: 7 % (ref 19.6–45.3)
LYMPHOCYTES NFR FLD MANUAL: 4 %
M CATARRHALIS DNA BAL NAA+NON-PRB-NCNCRNG: NOT DETECTED
M PNEUMO IGG SER IA-ACNC: NOT DETECTED
MACROCYTES BLD QL SMEAR: ABNORMAL
MACROPHAGE FLUID: 3 %
MAGNESIUM SERPL-MCNC: 2 MG/DL (ref 1.6–2.4)
MAGNESIUM SERPL-MCNC: 2.1 MG/DL (ref 1.6–2.4)
MAGNESIUM SERPL-MCNC: 2.1 MG/DL (ref 1.6–2.4)
MAGNESIUM SERPL-MCNC: 2.4 MG/DL (ref 1.6–2.4)
MCH RBC QN AUTO: 30.2 PG (ref 26.6–33)
MCH RBC QN AUTO: 30.6 PG (ref 26.6–33)
MCH RBC QN AUTO: 30.8 PG (ref 26.6–33)
MCH RBC QN AUTO: 31.4 PG (ref 26.6–33)
MCH RBC QN AUTO: 31.5 PG (ref 26.6–33)
MCH RBC QN AUTO: 31.8 PG (ref 26.6–33)
MCH RBC QN AUTO: 31.8 PG (ref 26.6–33)
MCH RBC QN AUTO: 31.9 PG (ref 26.6–33)
MCH RBC QN AUTO: 32.3 PG (ref 26.6–33)
MCHC RBC AUTO-ENTMCNC: 31.6 G/DL (ref 31.5–35.7)
MCHC RBC AUTO-ENTMCNC: 31.6 G/DL (ref 31.5–35.7)
MCHC RBC AUTO-ENTMCNC: 32.7 G/DL (ref 31.5–35.7)
MCHC RBC AUTO-ENTMCNC: 33.3 G/DL (ref 31.5–35.7)
MCHC RBC AUTO-ENTMCNC: 33.4 G/DL (ref 31.5–35.7)
MCHC RBC AUTO-ENTMCNC: 33.6 G/DL (ref 31.5–35.7)
MCHC RBC AUTO-ENTMCNC: 33.7 G/DL (ref 31.5–35.7)
MCHC RBC AUTO-ENTMCNC: 34.1 G/DL (ref 31.5–35.7)
MCHC RBC AUTO-ENTMCNC: 34.3 G/DL (ref 31.5–35.7)
MCHC RBC AUTO-ENTMCNC: 34.7 G/DL (ref 31.5–35.7)
MCHC RBC AUTO-ENTMCNC: 34.7 G/DL (ref 31.5–35.7)
MCV RBC AUTO: 100.7 FL (ref 79–97)
MCV RBC AUTO: 100.7 FL (ref 79–97)
MCV RBC AUTO: 88.7 FL (ref 79–97)
MCV RBC AUTO: 90.5 FL (ref 79–97)
MCV RBC AUTO: 90.6 FL (ref 79–97)
MCV RBC AUTO: 90.7 FL (ref 79–97)
MCV RBC AUTO: 90.8 FL (ref 79–97)
MCV RBC AUTO: 92.2 FL (ref 79–97)
MCV RBC AUTO: 92.6 FL (ref 79–97)
MCV RBC AUTO: 93.6 FL (ref 79–97)
MCV RBC AUTO: 98.5 FL (ref 79–97)
MECA+MECC ISLT/SPM QL: NORMAL
METHGB BLD QL: 0.4 % (ref 0–1.5)
METHGB BLD QL: 0.5 % (ref 0–1.5)
METHGB BLD QL: 0.6 % (ref 0–1.5)
MODALITY: ABNORMAL
MONOCYTES # BLD AUTO: 0.2 10*3/MM3 (ref 0.1–0.9)
MONOCYTES # BLD AUTO: 0.78 10*3/MM3 (ref 0.1–0.9)
MONOCYTES # BLD AUTO: 0.79 10*3/MM3 (ref 0.1–0.9)
MONOCYTES # BLD AUTO: 0.8 10*3/MM3 (ref 0.1–0.9)
MONOCYTES # BLD AUTO: 1.14 10*3/MM3 (ref 0.1–0.9)
MONOCYTES # BLD AUTO: 1.32 10*3/MM3 (ref 0.1–0.9)
MONOCYTES # BLD AUTO: 1.43 10*3/MM3 (ref 0.1–0.9)
MONOCYTES # BLD AUTO: 1.52 10*3/MM3 (ref 0.1–0.9)
MONOCYTES # BLD AUTO: 1.53 10*3/MM3 (ref 0.1–0.9)
MONOCYTES NFR BLD AUTO: 1.5 % (ref 5–12)
MONOCYTES NFR BLD AUTO: 12.4 % (ref 5–12)
MONOCYTES NFR BLD AUTO: 6.4 % (ref 5–12)
MONOCYTES NFR BLD AUTO: 6.9 % (ref 5–12)
MONOCYTES NFR BLD AUTO: 7.6 % (ref 5–12)
MONOCYTES NFR BLD AUTO: 8 % (ref 5–12)
MONOCYTES NFR BLD AUTO: 8.8 % (ref 5–12)
MONOCYTES NFR BLD AUTO: 9 % (ref 5–12)
MONOCYTES NFR BLD AUTO: 9.1 % (ref 5–12)
MRSA DNA SPEC QL NAA+PROBE: NORMAL
NDM GENE: NORMAL
NEUTROPHILS # BLD AUTO: 16.37 10*3/MM3 (ref 1.7–7)
NEUTROPHILS NFR BLD AUTO: 11.06 10*3/MM3 (ref 1.7–7)
NEUTROPHILS NFR BLD AUTO: 11.74 10*3/MM3 (ref 1.7–7)
NEUTROPHILS NFR BLD AUTO: 12.82 10*3/MM3 (ref 1.7–7)
NEUTROPHILS NFR BLD AUTO: 15.82 10*3/MM3 (ref 1.7–7)
NEUTROPHILS NFR BLD AUTO: 16.15 10*3/MM3 (ref 1.7–7)
NEUTROPHILS NFR BLD AUTO: 3.75 10*3/MM3 (ref 1.7–7)
NEUTROPHILS NFR BLD AUTO: 59 % (ref 42.7–76)
NEUTROPHILS NFR BLD AUTO: 62.9 % (ref 42.7–76)
NEUTROPHILS NFR BLD AUTO: 65.3 % (ref 42.7–76)
NEUTROPHILS NFR BLD AUTO: 7.14 10*3/MM3 (ref 1.7–7)
NEUTROPHILS NFR BLD AUTO: 74.9 % (ref 42.7–76)
NEUTROPHILS NFR BLD AUTO: 76 % (ref 42.7–76)
NEUTROPHILS NFR BLD AUTO: 76.7 % (ref 42.7–76)
NEUTROPHILS NFR BLD AUTO: 8.15 10*3/MM3 (ref 1.7–7)
NEUTROPHILS NFR BLD AUTO: 84.2 % (ref 42.7–76)
NEUTROPHILS NFR BLD AUTO: 85 % (ref 42.7–76)
NEUTROPHILS NFR BLD AUTO: 86 % (ref 42.7–76)
NEUTROPHILS NFR BLD AUTO: 9.69 10*3/MM3 (ref 1.7–7)
NEUTROPHILS NFR BLD MANUAL: 89 % (ref 42.7–76)
NEUTROPHILS NFR FLD MANUAL: 92 %
NEUTS BAND NFR BLD MANUAL: 9 % (ref 0–5)
NITRITE UR QL STRIP: NEGATIVE
NOTE: ABNORMAL
NRBC BLD AUTO-RTO: 0 /100 WBC (ref 0–0.2)
NRBC BLD AUTO-RTO: 0.2 /100 WBC (ref 0–0.2)
NRBC BLD AUTO-RTO: 0.2 /100 WBC (ref 0–0.2)
NRBC BLD AUTO-RTO: 0.4 /100 WBC (ref 0–0.2)
NT-PROBNP SERPL-MCNC: 3046 PG/ML (ref 0–900)
OXYHGB MFR BLDV: 92.3 % (ref 94–99)
OXYHGB MFR BLDV: 92.9 % (ref 94–99)
OXYHGB MFR BLDV: 93.1 % (ref 94–99)
OXYHGB MFR BLDV: 94.7 % (ref 94–99)
OXYHGB MFR BLDV: 96.8 % (ref 94–99)
P AERUGINOSA DNA BAL NAA+NON-PRB-NCNCRNG: NOT DETECTED
PATH REPORT.FINAL DX SPEC: NORMAL
PATH REPORT.GROSS SPEC: NORMAL
PCO2 BLDA: 35.5 MM HG (ref 35–45)
PCO2 BLDA: 51.7 MM HG (ref 35–45)
PCO2 BLDA: 54.6 MM HG (ref 35–45)
PCO2 BLDA: 60.3 MM HG (ref 35–45)
PCO2 BLDA: 70 MM HG (ref 35–45)
PH BLDA: 7.06 PH UNITS (ref 7.35–7.45)
PH BLDA: 7.1 PH UNITS (ref 7.35–7.45)
PH BLDA: 7.12 PH UNITS (ref 7.35–7.45)
PH BLDA: 7.19 PH UNITS (ref 7.35–7.45)
PH BLDA: 7.31 PH UNITS (ref 7.35–7.45)
PH UR STRIP.AUTO: 5.5 [PH] (ref 5–8)
PH UR STRIP.AUTO: 5.5 [PH] (ref 5–8)
PH UR STRIP.AUTO: <=5 [PH] (ref 5–8)
PHOSPHATE SERPL-MCNC: 11.3 MG/DL (ref 2.5–4.5)
PHOSPHATE SERPL-MCNC: 2.8 MG/DL (ref 2.5–4.5)
PHOSPHATE SERPL-MCNC: 3 MG/DL (ref 2.5–4.5)
PHOSPHATE SERPL-MCNC: 3.9 MG/DL (ref 2.5–4.5)
PHOSPHATE SERPL-MCNC: 6.5 MG/DL (ref 2.5–4.5)
PHOSPHATE SERPL-MCNC: 7 MG/DL (ref 2.5–4.5)
PLATELET # BLD AUTO: 149 10*3/MM3 (ref 140–450)
PLATELET # BLD AUTO: 206 10*3/MM3 (ref 140–450)
PLATELET # BLD AUTO: 216 10*3/MM3 (ref 140–450)
PLATELET # BLD AUTO: 258 10*3/MM3 (ref 140–450)
PLATELET # BLD AUTO: 268 10*3/MM3 (ref 140–450)
PLATELET # BLD AUTO: 276 10*3/MM3 (ref 140–450)
PLATELET # BLD AUTO: 279 10*3/MM3 (ref 140–450)
PLATELET # BLD AUTO: 287 10*3/MM3 (ref 140–450)
PLATELET # BLD AUTO: 322 10*3/MM3 (ref 140–450)
PLATELET # BLD AUTO: 404 10*3/MM3 (ref 140–450)
PLATELET # BLD AUTO: 92 10*3/MM3 (ref 140–450)
PMV BLD AUTO: 10.1 FL (ref 6–12)
PMV BLD AUTO: 10.1 FL (ref 6–12)
PMV BLD AUTO: 10.2 FL (ref 6–12)
PMV BLD AUTO: 10.3 FL (ref 6–12)
PMV BLD AUTO: 10.3 FL (ref 6–12)
PMV BLD AUTO: 10.5 FL (ref 6–12)
PMV BLD AUTO: 10.6 FL (ref 6–12)
PMV BLD AUTO: 10.7 FL (ref 6–12)
PMV BLD AUTO: 11 FL (ref 6–12)
PMV BLD AUTO: 9.6 FL (ref 6–12)
PMV BLD AUTO: 9.9 FL (ref 6–12)
PO2 BLD: 115 MM[HG] (ref 0–500)
PO2 BLD: 117 MM[HG] (ref 0–500)
PO2 BLD: 86 MM[HG] (ref 0–500)
PO2 BLD: 86 MM[HG] (ref 0–500)
PO2 BLD: 92 MM[HG] (ref 0–500)
PO2 BLDA: 103.7 MM HG (ref 80–100)
PO2 BLDA: 117 MM HG (ref 80–100)
PO2 BLDA: 73.1 MM HG (ref 80–100)
PO2 BLDA: 86.3 MM HG (ref 80–100)
PO2 BLDA: 92.2 MM HG (ref 80–100)
POLYCHROMASIA BLD QL SMEAR: ABNORMAL
POTASSIUM BLDA-SCNC: 4.92 MMOL/L (ref 3.5–5)
POTASSIUM BLDA-SCNC: 5.11 MMOL/L (ref 3.5–5)
POTASSIUM BLDA-SCNC: 5.2 MMOL/L (ref 3.5–5)
POTASSIUM BLDA-SCNC: 6 MMOL/L (ref 3.5–5)
POTASSIUM SERPL-SCNC: 4.3 MMOL/L (ref 3.5–5.2)
POTASSIUM SERPL-SCNC: 4.4 MMOL/L (ref 3.5–5.2)
POTASSIUM SERPL-SCNC: 4.6 MMOL/L (ref 3.5–5.2)
POTASSIUM SERPL-SCNC: 4.6 MMOL/L (ref 3.5–5.2)
POTASSIUM SERPL-SCNC: 4.7 MMOL/L (ref 3.5–5.2)
POTASSIUM SERPL-SCNC: 4.9 MMOL/L (ref 3.5–5.2)
POTASSIUM SERPL-SCNC: 5 MMOL/L (ref 3.5–5.2)
POTASSIUM SERPL-SCNC: 5.1 MMOL/L (ref 3.5–5.2)
POTASSIUM SERPL-SCNC: 5.2 MMOL/L (ref 3.5–5.2)
POTASSIUM SERPL-SCNC: 5.3 MMOL/L (ref 3.5–5.2)
POTASSIUM SERPL-SCNC: 5.7 MMOL/L (ref 3.5–5.2)
POTASSIUM SERPL-SCNC: 6.3 MMOL/L (ref 3.5–5.2)
POTASSIUM SERPL-SCNC: 6.3 MMOL/L (ref 3.5–5.2)
PROCALCITONIN SERPL-MCNC: 21.65 NG/ML (ref 0–0.25)
PROCALCITONIN SERPL-MCNC: 23.28 NG/ML (ref 0–0.25)
PROCALCITONIN SERPL-MCNC: 24.41 NG/ML (ref 0–0.25)
PROT SERPL-MCNC: 4.2 G/DL (ref 6–8.5)
PROT SERPL-MCNC: 4.2 G/DL (ref 6–8.5)
PROT SERPL-MCNC: 4.5 G/DL (ref 6–8.5)
PROT SERPL-MCNC: 5.1 G/DL (ref 6–8.5)
PROT SERPL-MCNC: 5.9 G/DL (ref 6–8.5)
PROT SERPL-MCNC: 5.9 G/DL (ref 6–8.5)
PROT SERPL-MCNC: 6.3 G/DL (ref 6–8.5)
PROT SERPL-MCNC: 7.2 G/DL (ref 6–8.5)
PROT SERPL-MCNC: 7.3 G/DL (ref 6–8.5)
PROT SERPL-MCNC: 7.6 G/DL (ref 6–8.5)
PROT SERPL-MCNC: 7.7 G/DL (ref 6–8.5)
PROT UR QL STRIP: ABNORMAL
PROT UR QL STRIP: NEGATIVE
PROT UR QL STRIP: NEGATIVE
PROTEUS SP DNA BAL NAA+NON-PRB-NCNCRNG: NOT DETECTED
PROTHROMBIN TIME: 14.2 SECONDS (ref 11.8–14.9)
PROTHROMBIN TIME: 26.8 SECONDS (ref 11.8–14.9)
RBC # BLD AUTO: 4 10*6/MM3 (ref 3.77–5.28)
RBC # BLD AUTO: 4.3 10*6/MM3 (ref 3.77–5.28)
RBC # BLD AUTO: 4.31 10*6/MM3 (ref 3.77–5.28)
RBC # BLD AUTO: 4.53 10*6/MM3 (ref 3.77–5.28)
RBC # BLD AUTO: 4.55 10*6/MM3 (ref 3.77–5.28)
RBC # BLD AUTO: 4.85 10*6/MM3 (ref 3.77–5.28)
RBC # BLD AUTO: 4.87 10*6/MM3 (ref 3.77–5.28)
RBC # BLD AUTO: 4.96 10*6/MM3 (ref 3.77–5.28)
RBC # BLD AUTO: 5.02 10*6/MM3 (ref 3.77–5.28)
RBC # BLD AUTO: 5.13 10*6/MM3 (ref 3.77–5.28)
RBC # BLD AUTO: 5.56 10*6/MM3 (ref 3.77–5.28)
RBC # UR STRIP: ABNORMAL /HPF
RBC # UR STRIP: ABNORMAL /HPF
REF LAB TEST METHOD: ABNORMAL
REF LAB TEST METHOD: ABNORMAL
RHINOVIRUS RNA SPEC NAA+PROBE: NOT DETECTED
RSV RNA NPH QL NAA+NON-PROBE: NOT DETECTED
S AUREUS DNA BAL NAA+NON-PRB-NCNCRNG: NOT DETECTED
S MARCESCENS DNA BAL NAA+NON-PRB-NCNCRNG: NOT DETECTED
S PNEUM AG SPEC QL LA: NEGATIVE
S PNEUM DNA BAL NAA+NON-PRB-NCNCRNG: NOT DETECTED
S PYO AG THROAT QL: NEGATIVE
S PYO DNA BAL NAA+NON-PRB-NCNCRNG: NOT DETECTED
SAO2 % BLDCOA: 93 % (ref 95–99)
SAO2 % BLDCOA: 93.4 % (ref 95–99)
SAO2 % BLDCOA: 94 % (ref 95–99)
SAO2 % BLDCOA: 95.7 % (ref 95–99)
SAO2 % BLDCOA: 97.5 % (ref 95–99)
SARS-COV-2 RNA PNL SPEC NAA+PROBE: DETECTED
SCAN SLIDE: NORMAL
SMALL PLATELETS BLD QL SMEAR: ADEQUATE
SODIUM BLDA-SCNC: 126.9 MMOL/L (ref 136–146)
SODIUM BLDA-SCNC: 132.6 MMOL/L (ref 136–146)
SODIUM BLDA-SCNC: 134.2 MMOL/L (ref 136–146)
SODIUM BLDA-SCNC: 134.8 MMOL/L (ref 136–146)
SODIUM SERPL-SCNC: 125 MMOL/L (ref 136–145)
SODIUM SERPL-SCNC: 126 MMOL/L (ref 136–145)
SODIUM SERPL-SCNC: 128 MMOL/L (ref 136–145)
SODIUM SERPL-SCNC: 129 MMOL/L (ref 136–145)
SODIUM SERPL-SCNC: 131 MMOL/L (ref 136–145)
SODIUM SERPL-SCNC: 132 MMOL/L (ref 136–145)
SODIUM SERPL-SCNC: 132 MMOL/L (ref 136–145)
SODIUM SERPL-SCNC: 133 MMOL/L (ref 136–145)
SODIUM SERPL-SCNC: 133 MMOL/L (ref 136–145)
SODIUM SERPL-SCNC: 134 MMOL/L (ref 136–145)
SODIUM SERPL-SCNC: 135 MMOL/L (ref 136–145)
SODIUM SERPL-SCNC: 137 MMOL/L (ref 136–145)
SODIUM SERPL-SCNC: 139 MMOL/L (ref 136–145)
SP GR UR STRIP: 1.01 (ref 1–1.03)
SP GR UR STRIP: 1.02 (ref 1–1.03)
SP GR UR STRIP: >1.03 (ref 1–1.03)
SQUAMOUS #/AREA URNS HPF: ABNORMAL /HPF
SQUAMOUS #/AREA URNS HPF: ABNORMAL /HPF
TARGETS BLD QL SMEAR: ABNORMAL
TRIGL SERPL-MCNC: 160 MG/DL (ref 0–150)
TRIGL SERPL-MCNC: 237 MG/DL (ref 0–150)
UROBILINOGEN UR QL STRIP: ABNORMAL
VANCOMYCIN SERPL-MCNC: 32.65 MCG/ML (ref 5–40)
VARIANT LYMPHS NFR BLD MANUAL: 2 % (ref 19.6–45.3)
VISUAL PRESENCE OF BLOOD: NORMAL
VLDLC SERPL-MCNC: 28 MG/DL (ref 5–40)
VLDLC SERPL-MCNC: 43 MG/DL (ref 5–40)
WBC # UR STRIP: ABNORMAL /HPF
WBC # UR STRIP: ABNORMAL /HPF
WBC MORPH BLD: NORMAL
WBC NRBC COR # BLD: 11.36 10*3/MM3 (ref 3.4–10.8)
WBC NRBC COR # BLD: 12.48 10*3/MM3 (ref 3.4–10.8)
WBC NRBC COR # BLD: 12.95 10*3/MM3 (ref 3.4–10.8)
WBC NRBC COR # BLD: 13.64 10*3/MM3 (ref 3.4–10.8)
WBC NRBC COR # BLD: 14.43 10*3/MM3 (ref 3.4–10.8)
WBC NRBC COR # BLD: 16.7 10*3/MM3 (ref 3.4–10.8)
WBC NRBC COR # BLD: 16.86 10*3/MM3 (ref 3.4–10.8)
WBC NRBC COR # BLD: 18.78 10*3/MM3 (ref 3.4–10.8)
WBC NRBC COR # BLD: 19.02 10*3/MM3 (ref 3.4–10.8)
WBC NRBC COR # BLD: 20.21 10*3/MM3 (ref 3.4–10.8)
WBC NRBC COR # BLD: 6.35 10*3/MM3 (ref 3.4–10.8)
WHOLE BLOOD HOLD COAG: NORMAL
WHOLE BLOOD HOLD COAG: NORMAL
WHOLE BLOOD HOLD SPECIMEN: NORMAL

## 2022-01-01 PROCEDURE — 83605 ASSAY OF LACTIC ACID: CPT | Performed by: FAMILY MEDICINE

## 2022-01-01 PROCEDURE — 25010000002 MIDAZOLAM PER 1 MG: Performed by: RADIOLOGY

## 2022-01-01 PROCEDURE — 36556 INSERT NON-TUNNEL CV CATH: CPT | Performed by: INTERNAL MEDICINE

## 2022-01-01 PROCEDURE — 0 GADOBENATE DIMEGLUMINE 529 MG/ML SOLUTION: Performed by: INTERNAL MEDICINE

## 2022-01-01 PROCEDURE — 94660 CPAP INITIATION&MGMT: CPT

## 2022-01-01 PROCEDURE — 83735 ASSAY OF MAGNESIUM: CPT | Performed by: INTERNAL MEDICINE

## 2022-01-01 PROCEDURE — 25010000002 CALCIUM GLUCONATE-NACL 1-0.675 GM/50ML-% SOLUTION: Performed by: PHYSICIAN ASSISTANT

## 2022-01-01 PROCEDURE — 63710000001 INSULIN REGULAR HUMAN PER 5 UNITS: Performed by: PHYSICIAN ASSISTANT

## 2022-01-01 PROCEDURE — 63710000001 INSULIN REGULAR HUMAN PER 5 UNITS: Performed by: HOSPITALIST

## 2022-01-01 PROCEDURE — 85025 COMPLETE CBC W/AUTO DIFF WBC: CPT | Performed by: INTERNAL MEDICINE

## 2022-01-01 PROCEDURE — 99291 CRITICAL CARE FIRST HOUR: CPT | Performed by: INTERNAL MEDICINE

## 2022-01-01 PROCEDURE — 83605 ASSAY OF LACTIC ACID: CPT | Performed by: EMERGENCY MEDICINE

## 2022-01-01 PROCEDURE — 25010000002 FENTANYL CITRATE (PF) 50 MCG/ML SOLUTION: Performed by: RADIOLOGY

## 2022-01-01 PROCEDURE — 82962 GLUCOSE BLOOD TEST: CPT

## 2022-01-01 PROCEDURE — 81003 URINALYSIS AUTO W/O SCOPE: CPT | Performed by: EMERGENCY MEDICINE

## 2022-01-01 PROCEDURE — A9577 INJ MULTIHANCE: HCPCS | Performed by: EMERGENCY MEDICINE

## 2022-01-01 PROCEDURE — 99222 1ST HOSP IP/OBS MODERATE 55: CPT | Performed by: INTERNAL MEDICINE

## 2022-01-01 PROCEDURE — 83050 HGB METHEMOGLOBIN QUAN: CPT | Performed by: HOSPITALIST

## 2022-01-01 PROCEDURE — 25010000002 VANCOMYCIN 5 G RECONSTITUTED SOLUTION: Performed by: EMERGENCY MEDICINE

## 2022-01-01 PROCEDURE — 99223 1ST HOSP IP/OBS HIGH 75: CPT | Performed by: INTERNAL MEDICINE

## 2022-01-01 PROCEDURE — 83690 ASSAY OF LIPASE: CPT

## 2022-01-01 PROCEDURE — 74183 MRI ABD W/O CNTR FLWD CNTR: CPT

## 2022-01-01 PROCEDURE — 82375 ASSAY CARBOXYHB QUANT: CPT | Performed by: INTERNAL MEDICINE

## 2022-01-01 PROCEDURE — 87081 CULTURE SCREEN ONLY: CPT | Performed by: EMERGENCY MEDICINE

## 2022-01-01 PROCEDURE — 25010000002 CEFEPIME PER 500 MG: Performed by: FAMILY MEDICINE

## 2022-01-01 PROCEDURE — 25010000002 ENOXAPARIN PER 10 MG: Performed by: INTERNAL MEDICINE

## 2022-01-01 PROCEDURE — 94003 VENT MGMT INPAT SUBQ DAY: CPT

## 2022-01-01 PROCEDURE — 82805 BLOOD GASES W/O2 SATURATION: CPT | Performed by: INTERNAL MEDICINE

## 2022-01-01 PROCEDURE — 5A1945Z RESPIRATORY VENTILATION, 24-96 CONSECUTIVE HOURS: ICD-10-PCS | Performed by: INTERNAL MEDICINE

## 2022-01-01 PROCEDURE — BW211ZZ COMPUTERIZED TOMOGRAPHY (CT SCAN) OF ABDOMEN AND PELVIS USING LOW OSMOLAR CONTRAST: ICD-10-PCS | Performed by: RADIOLOGY

## 2022-01-01 PROCEDURE — 99214 OFFICE O/P EST MOD 30 MIN: CPT | Performed by: PHYSICIAN ASSISTANT

## 2022-01-01 PROCEDURE — 83036 HEMOGLOBIN GLYCOSYLATED A1C: CPT | Performed by: INTERNAL MEDICINE

## 2022-01-01 PROCEDURE — 94799 UNLISTED PULMONARY SVC/PX: CPT

## 2022-01-01 PROCEDURE — G0463 HOSPITAL OUTPT CLINIC VISIT: HCPCS | Performed by: INTERNAL MEDICINE

## 2022-01-01 PROCEDURE — 25010000002 PROPOFOL 10 MG/ML EMULSION: Performed by: INTERNAL MEDICINE

## 2022-01-01 PROCEDURE — 85025 COMPLETE CBC W/AUTO DIFF WBC: CPT | Performed by: FAMILY MEDICINE

## 2022-01-01 PROCEDURE — 84100 ASSAY OF PHOSPHORUS: CPT | Performed by: INTERNAL MEDICINE

## 2022-01-01 PROCEDURE — 0 IOPAMIDOL PER 1 ML: Performed by: EMERGENCY MEDICINE

## 2022-01-01 PROCEDURE — 71045 X-RAY EXAM CHEST 1 VIEW: CPT

## 2022-01-01 PROCEDURE — 88342 IMHCHEM/IMCYTCHM 1ST ANTB: CPT | Performed by: INTERNAL MEDICINE

## 2022-01-01 PROCEDURE — 63710000001 INSULIN LISPRO (HUMAN) PER 5 UNITS: Performed by: FAMILY MEDICINE

## 2022-01-01 PROCEDURE — 99233 SBSQ HOSP IP/OBS HIGH 50: CPT | Performed by: INTERNAL MEDICINE

## 2022-01-01 PROCEDURE — 84145 PROCALCITONIN (PCT): CPT | Performed by: INTERNAL MEDICINE

## 2022-01-01 PROCEDURE — 99233 SBSQ HOSP IP/OBS HIGH 50: CPT | Performed by: FAMILY MEDICINE

## 2022-01-01 PROCEDURE — 74177 CT ABD & PELVIS W/CONTRAST: CPT

## 2022-01-01 PROCEDURE — 99223 1ST HOSP IP/OBS HIGH 75: CPT | Performed by: FAMILY MEDICINE

## 2022-01-01 PROCEDURE — 80053 COMPREHEN METABOLIC PANEL: CPT | Performed by: FAMILY MEDICINE

## 2022-01-01 PROCEDURE — 83050 HGB METHEMOGLOBIN QUAN: CPT | Performed by: INTERNAL MEDICINE

## 2022-01-01 PROCEDURE — 36600 WITHDRAWAL OF ARTERIAL BLOOD: CPT | Performed by: INTERNAL MEDICINE

## 2022-01-01 PROCEDURE — 83615 LACTATE (LD) (LDH) ENZYME: CPT

## 2022-01-01 PROCEDURE — 73718 MRI LOWER EXTREMITY W/O DYE: CPT

## 2022-01-01 PROCEDURE — 87071 CULTURE AEROBIC QUANT OTHER: CPT | Performed by: INTERNAL MEDICINE

## 2022-01-01 PROCEDURE — 0B9F8ZX DRAINAGE OF RIGHT LOWER LUNG LOBE, VIA NATURAL OR ARTIFICIAL OPENING ENDOSCOPIC, DIAGNOSTIC: ICD-10-PCS | Performed by: INTERNAL MEDICINE

## 2022-01-01 PROCEDURE — 85610 PROTHROMBIN TIME: CPT | Performed by: INTERNAL MEDICINE

## 2022-01-01 PROCEDURE — 80061 LIPID PANEL: CPT | Performed by: INTERNAL MEDICINE

## 2022-01-01 PROCEDURE — 97161 PT EVAL LOW COMPLEX 20 MIN: CPT

## 2022-01-01 PROCEDURE — 36600 WITHDRAWAL OF ARTERIAL BLOOD: CPT | Performed by: PHYSICIAN ASSISTANT

## 2022-01-01 PROCEDURE — 99406 BEHAV CHNG SMOKING 3-10 MIN: CPT | Performed by: NURSE PRACTITIONER

## 2022-01-01 PROCEDURE — 25010000002 ALBUMIN HUMAN 25% PER 50 ML: Performed by: INTERNAL MEDICINE

## 2022-01-01 PROCEDURE — 0FB13ZX EXCISION OF RIGHT LOBE LIVER, PERCUTANEOUS APPROACH, DIAGNOSTIC: ICD-10-PCS | Performed by: RADIOLOGY

## 2022-01-01 PROCEDURE — 85025 COMPLETE CBC W/AUTO DIFF WBC: CPT | Performed by: EMERGENCY MEDICINE

## 2022-01-01 PROCEDURE — 25010000002 ONDANSETRON PER 1 MG: Performed by: EMERGENCY MEDICINE

## 2022-01-01 PROCEDURE — 96375 TX/PRO/DX INJ NEW DRUG ADDON: CPT

## 2022-01-01 PROCEDURE — 0 GADOBENATE DIMEGLUMINE 529 MG/ML SOLUTION: Performed by: EMERGENCY MEDICINE

## 2022-01-01 PROCEDURE — 84100 ASSAY OF PHOSPHORUS: CPT

## 2022-01-01 PROCEDURE — 25010000002 DEXAMETHASONE PER 1 MG: Performed by: INTERNAL MEDICINE

## 2022-01-01 PROCEDURE — 36415 COLL VENOUS BLD VENIPUNCTURE: CPT

## 2022-01-01 PROCEDURE — 87449 NOS EACH ORGANISM AG IA: CPT | Performed by: INTERNAL MEDICINE

## 2022-01-01 PROCEDURE — 77012 CT SCAN FOR NEEDLE BIOPSY: CPT

## 2022-01-01 PROCEDURE — 71250 CT THORAX DX C-: CPT

## 2022-01-01 PROCEDURE — 25010000002 FENTANYL CITRATE (PF) 50 MCG/ML SOLUTION: Performed by: INTERNAL MEDICINE

## 2022-01-01 PROCEDURE — 63710000001 INSULIN REGULAR HUMAN PER 5 UNITS: Performed by: INTERNAL MEDICINE

## 2022-01-01 PROCEDURE — 25010000002 CARBOPLATIN PER 50 MG: Performed by: INTERNAL MEDICINE

## 2022-01-01 PROCEDURE — 80048 BASIC METABOLIC PNL TOTAL CA: CPT | Performed by: FAMILY MEDICINE

## 2022-01-01 PROCEDURE — 25010000002 VANCOMYCIN 5 G RECONSTITUTED SOLUTION: Performed by: FAMILY MEDICINE

## 2022-01-01 PROCEDURE — 99284 EMERGENCY DEPT VISIT MOD MDM: CPT

## 2022-01-01 PROCEDURE — 84100 ASSAY OF PHOSPHORUS: CPT | Performed by: PHYSICIAN ASSISTANT

## 2022-01-01 PROCEDURE — 81001 URINALYSIS AUTO W/SCOPE: CPT

## 2022-01-01 PROCEDURE — 83036 HEMOGLOBIN GLYCOSYLATED A1C: CPT | Performed by: PHYSICIAN ASSISTANT

## 2022-01-01 PROCEDURE — 83605 ASSAY OF LACTIC ACID: CPT

## 2022-01-01 PROCEDURE — 99203 OFFICE O/P NEW LOW 30 MIN: CPT | Performed by: PODIATRIST

## 2022-01-01 PROCEDURE — 99292 CRITICAL CARE ADDL 30 MIN: CPT | Performed by: INTERNAL MEDICINE

## 2022-01-01 PROCEDURE — 85025 COMPLETE CBC W/AUTO DIFF WBC: CPT | Performed by: PHYSICIAN ASSISTANT

## 2022-01-01 PROCEDURE — 25010000002 PALONOSETRON PER 25 MCG: Performed by: INTERNAL MEDICINE

## 2022-01-01 PROCEDURE — U0004 COV-19 TEST NON-CDC HGH THRU: HCPCS | Performed by: EMERGENCY MEDICINE

## 2022-01-01 PROCEDURE — 36415 COLL VENOUS BLD VENIPUNCTURE: CPT | Performed by: INTERNAL MEDICINE

## 2022-01-01 PROCEDURE — 87880 STREP A ASSAY W/OPTIC: CPT | Performed by: EMERGENCY MEDICINE

## 2022-01-01 PROCEDURE — 36600 WITHDRAWAL OF ARTERIAL BLOOD: CPT | Performed by: HOSPITALIST

## 2022-01-01 PROCEDURE — 0BH17EZ INSERTION OF ENDOTRACHEAL AIRWAY INTO TRACHEA, VIA NATURAL OR ARTIFICIAL OPENING: ICD-10-PCS | Performed by: INTERNAL MEDICINE

## 2022-01-01 PROCEDURE — 25010000002 FOSAPREPITANT PER 1 MG: Performed by: INTERNAL MEDICINE

## 2022-01-01 PROCEDURE — 25010000002 HYDROMORPHONE 1 MG/ML SOLUTION: Performed by: HOSPITALIST

## 2022-01-01 PROCEDURE — 86301 IMMUNOASSAY TUMOR CA 19-9: CPT | Performed by: INTERNAL MEDICINE

## 2022-01-01 PROCEDURE — 84100 ASSAY OF PHOSPHORUS: CPT | Performed by: FAMILY MEDICINE

## 2022-01-01 PROCEDURE — 92610 EVALUATE SWALLOWING FUNCTION: CPT

## 2022-01-01 PROCEDURE — 87804 INFLUENZA ASSAY W/OPTIC: CPT | Performed by: FAMILY MEDICINE

## 2022-01-01 PROCEDURE — 25010000002 NALOXONE PER 1 MG: Performed by: HOSPITALIST

## 2022-01-01 PROCEDURE — 80202 ASSAY OF VANCOMYCIN: CPT | Performed by: FAMILY MEDICINE

## 2022-01-01 PROCEDURE — 96374 THER/PROPH/DIAG INJ IV PUSH: CPT

## 2022-01-01 PROCEDURE — 76705 ECHO EXAM OF ABDOMEN: CPT

## 2022-01-01 PROCEDURE — 85007 BL SMEAR W/DIFF WBC COUNT: CPT | Performed by: INTERNAL MEDICINE

## 2022-01-01 PROCEDURE — 82105 ALPHA-FETOPROTEIN SERUM: CPT | Performed by: INTERNAL MEDICINE

## 2022-01-01 PROCEDURE — 70450 CT HEAD/BRAIN W/O DYE: CPT

## 2022-01-01 PROCEDURE — 80061 LIPID PANEL: CPT | Performed by: PHYSICIAN ASSISTANT

## 2022-01-01 PROCEDURE — 25010000002 CALCIUM GLUCONATE-NACL 1-0.675 GM/50ML-% SOLUTION: Performed by: INTERNAL MEDICINE

## 2022-01-01 PROCEDURE — 87633 RESP VIRUS 12-25 TARGETS: CPT | Performed by: INTERNAL MEDICINE

## 2022-01-01 PROCEDURE — 95822 EEG COMA OR SLEEP ONLY: CPT

## 2022-01-01 PROCEDURE — 83690 ASSAY OF LIPASE: CPT | Performed by: EMERGENCY MEDICINE

## 2022-01-01 PROCEDURE — 88307 TISSUE EXAM BY PATHOLOGIST: CPT | Performed by: INTERNAL MEDICINE

## 2022-01-01 PROCEDURE — 85025 COMPLETE CBC W/AUTO DIFF WBC: CPT

## 2022-01-01 PROCEDURE — 83735 ASSAY OF MAGNESIUM: CPT | Performed by: FAMILY MEDICINE

## 2022-01-01 PROCEDURE — 25010000002 CEFEPIME PER 500 MG: Performed by: EMERGENCY MEDICINE

## 2022-01-01 PROCEDURE — 84145 PROCALCITONIN (PCT): CPT | Performed by: FAMILY MEDICINE

## 2022-01-01 PROCEDURE — 80053 COMPREHEN METABOLIC PANEL: CPT | Performed by: INTERNAL MEDICINE

## 2022-01-01 PROCEDURE — 80053 COMPREHEN METABOLIC PANEL: CPT | Performed by: EMERGENCY MEDICINE

## 2022-01-01 PROCEDURE — 25010000002 MIDAZOLAM PER 1 MG: Performed by: INTERNAL MEDICINE

## 2022-01-01 PROCEDURE — 87205 SMEAR GRAM STAIN: CPT | Performed by: INTERNAL MEDICINE

## 2022-01-01 PROCEDURE — 36415 COLL VENOUS BLD VENIPUNCTURE: CPT | Performed by: PHYSICIAN ASSISTANT

## 2022-01-01 PROCEDURE — 99214 OFFICE O/P EST MOD 30 MIN: CPT | Performed by: INTERNAL MEDICINE

## 2022-01-01 PROCEDURE — 99231 SBSQ HOSP IP/OBS SF/LOW 25: CPT | Performed by: INTERNAL MEDICINE

## 2022-01-01 PROCEDURE — 36415 COLL VENOUS BLD VENIPUNCTURE: CPT | Performed by: EMERGENCY MEDICINE

## 2022-01-01 PROCEDURE — 82805 BLOOD GASES W/O2 SATURATION: CPT | Performed by: HOSPITALIST

## 2022-01-01 PROCEDURE — 31645 BRNCHSC W/THER ASPIR 1ST: CPT | Performed by: INTERNAL MEDICINE

## 2022-01-01 PROCEDURE — 83880 ASSAY OF NATRIURETIC PEPTIDE: CPT | Performed by: INTERNAL MEDICINE

## 2022-01-01 PROCEDURE — 89051 BODY FLUID CELL COUNT: CPT | Performed by: INTERNAL MEDICINE

## 2022-01-01 PROCEDURE — 82378 CARCINOEMBRYONIC ANTIGEN: CPT | Performed by: INTERNAL MEDICINE

## 2022-01-01 PROCEDURE — A9577 INJ MULTIHANCE: HCPCS | Performed by: INTERNAL MEDICINE

## 2022-01-01 PROCEDURE — 80053 COMPREHEN METABOLIC PANEL: CPT | Performed by: PHYSICIAN ASSISTANT

## 2022-01-01 PROCEDURE — 80053 COMPREHEN METABOLIC PANEL: CPT

## 2022-01-01 PROCEDURE — 82805 BLOOD GASES W/O2 SATURATION: CPT | Performed by: PHYSICIAN ASSISTANT

## 2022-01-01 PROCEDURE — 82375 ASSAY CARBOXYHB QUANT: CPT | Performed by: PHYSICIAN ASSISTANT

## 2022-01-01 PROCEDURE — 81001 URINALYSIS AUTO W/SCOPE: CPT | Performed by: EMERGENCY MEDICINE

## 2022-01-01 PROCEDURE — 31624 DX BRONCHOSCOPE/LAVAGE: CPT | Performed by: INTERNAL MEDICINE

## 2022-01-01 PROCEDURE — 31500 INSERT EMERGENCY AIRWAY: CPT | Performed by: INTERNAL MEDICINE

## 2022-01-01 PROCEDURE — 99215 OFFICE O/P EST HI 40 MIN: CPT | Performed by: NURSE PRACTITIONER

## 2022-01-01 PROCEDURE — 25010000002 MORPHINE PER 10 MG: Performed by: FAMILY MEDICINE

## 2022-01-01 PROCEDURE — 99205 OFFICE O/P NEW HI 60 MIN: CPT | Performed by: INTERNAL MEDICINE

## 2022-01-01 PROCEDURE — 94002 VENT MGMT INPAT INIT DAY: CPT

## 2022-01-01 PROCEDURE — 70553 MRI BRAIN STEM W/O & W/DYE: CPT

## 2022-01-01 PROCEDURE — 99283 EMERGENCY DEPT VISIT LOW MDM: CPT

## 2022-01-01 PROCEDURE — 87641 MR-STAPH DNA AMP PROBE: CPT | Performed by: INTERNAL MEDICINE

## 2022-01-01 PROCEDURE — 87040 BLOOD CULTURE FOR BACTERIA: CPT | Performed by: EMERGENCY MEDICINE

## 2022-01-01 PROCEDURE — 83605 ASSAY OF LACTIC ACID: CPT | Performed by: INTERNAL MEDICINE

## 2022-01-01 PROCEDURE — 82140 ASSAY OF AMMONIA: CPT | Performed by: FAMILY MEDICINE

## 2022-01-01 PROCEDURE — 25010000002 ETOPOSIDE 1 GM/50ML SOLUTION 50 ML VIAL: Performed by: INTERNAL MEDICINE

## 2022-01-01 PROCEDURE — 25010000002 FUROSEMIDE PER 20 MG: Performed by: HOSPITALIST

## 2022-01-01 PROCEDURE — 99239 HOSP IP/OBS DSCHRG MGMT >30: CPT | Performed by: INTERNAL MEDICINE

## 2022-01-01 PROCEDURE — 82375 ASSAY CARBOXYHB QUANT: CPT | Performed by: HOSPITALIST

## 2022-01-01 PROCEDURE — 88341 IMHCHEM/IMCYTCHM EA ADD ANTB: CPT | Performed by: INTERNAL MEDICINE

## 2022-01-01 PROCEDURE — P9047 ALBUMIN (HUMAN), 25%, 50ML: HCPCS | Performed by: INTERNAL MEDICINE

## 2022-01-01 PROCEDURE — 87899 AGENT NOS ASSAY W/OPTIC: CPT | Performed by: INTERNAL MEDICINE

## 2022-01-01 PROCEDURE — U0005 INFEC AGEN DETEC AMPLI PROBE: HCPCS | Performed by: EMERGENCY MEDICINE

## 2022-01-01 PROCEDURE — 85027 COMPLETE CBC AUTOMATED: CPT | Performed by: FAMILY MEDICINE

## 2022-01-01 PROCEDURE — 83050 HGB METHEMOGLOBIN QUAN: CPT | Performed by: PHYSICIAN ASSISTANT

## 2022-01-01 PROCEDURE — 02HV33Z INSERTION OF INFUSION DEVICE INTO SUPERIOR VENA CAVA, PERCUTANEOUS APPROACH: ICD-10-PCS | Performed by: FAMILY MEDICINE

## 2022-01-01 RX ORDER — ONDANSETRON 4 MG/1
4 TABLET, ORALLY DISINTEGRATING ORAL EVERY 8 HOURS PRN
Qty: 15 TABLET | Refills: 0 | Status: SHIPPED | OUTPATIENT
Start: 2022-01-01 | End: 2022-01-01 | Stop reason: ALTCHOICE

## 2022-01-01 RX ORDER — KETAMINE HYDROCHLORIDE 50 MG/ML
200 INJECTION, SOLUTION, CONCENTRATE INTRAMUSCULAR; INTRAVENOUS ONCE
Status: COMPLETED | OUTPATIENT
Start: 2022-01-01 | End: 2022-01-01

## 2022-01-01 RX ORDER — FUROSEMIDE 10 MG/ML
40 INJECTION INTRAMUSCULAR; INTRAVENOUS ONCE
Status: COMPLETED | OUTPATIENT
Start: 2022-01-01 | End: 2022-01-01

## 2022-01-01 RX ORDER — AMOXICILLIN AND CLAVULANATE POTASSIUM 875; 125 MG/1; MG/1
1 TABLET, FILM COATED ORAL EVERY 12 HOURS SCHEDULED
Qty: 12 TABLET | Refills: 0 | Status: SHIPPED | OUTPATIENT
Start: 2022-01-01 | End: 2022-01-01

## 2022-01-01 RX ORDER — ENOXAPARIN SODIUM 100 MG/ML
40 INJECTION SUBCUTANEOUS EVERY 24 HOURS
Status: DISCONTINUED | OUTPATIENT
Start: 2022-01-01 | End: 2022-01-01

## 2022-01-01 RX ORDER — LISINOPRIL 40 MG/1
40 TABLET ORAL DAILY
Qty: 30 TABLET | Refills: 5 | Status: SHIPPED | OUTPATIENT
Start: 2022-01-01

## 2022-01-01 RX ORDER — CHOLECALCIFEROL (VITAMIN D3) 125 MCG
5 CAPSULE ORAL NIGHTLY PRN
Status: DISCONTINUED | OUTPATIENT
Start: 2022-01-01 | End: 2022-01-01

## 2022-01-01 RX ORDER — SODIUM CHLORIDE 0.9 % (FLUSH) 0.9 %
10 SYRINGE (ML) INJECTION EVERY 12 HOURS SCHEDULED
Status: DISCONTINUED | OUTPATIENT
Start: 2022-01-01 | End: 2022-01-01 | Stop reason: HOSPADM

## 2022-01-01 RX ORDER — CEFEPIME 1 G/50ML
2 INJECTION, SOLUTION INTRAVENOUS EVERY 12 HOURS
Status: DISCONTINUED | OUTPATIENT
Start: 2022-01-01 | End: 2022-01-01

## 2022-01-01 RX ORDER — ACETAMINOPHEN 325 MG/1
650 TABLET ORAL EVERY 4 HOURS PRN
Status: DISCONTINUED | OUTPATIENT
Start: 2022-01-01 | End: 2022-01-01

## 2022-01-01 RX ORDER — FAMOTIDINE 10 MG/ML
20 INJECTION, SOLUTION INTRAVENOUS EVERY 12 HOURS SCHEDULED
Status: DISCONTINUED | OUTPATIENT
Start: 2022-01-01 | End: 2022-01-01

## 2022-01-01 RX ORDER — SODIUM CHLORIDE 0.9 % (FLUSH) 0.9 %
10 SYRINGE (ML) INJECTION AS NEEDED
Status: DISCONTINUED | OUTPATIENT
Start: 2022-01-01 | End: 2022-01-01 | Stop reason: HOSPADM

## 2022-01-01 RX ORDER — ATENOLOL 25 MG/1
25 TABLET ORAL DAILY
Qty: 30 TABLET | Refills: 5 | Status: SHIPPED | OUTPATIENT
Start: 2022-01-01

## 2022-01-01 RX ORDER — NOREPINEPHRINE BIT/0.9 % NACL 8 MG/250ML
INFUSION BOTTLE (ML) INTRAVENOUS
Status: DISPENSED
Start: 2022-01-01 | End: 2022-01-01

## 2022-01-01 RX ORDER — AMOXICILLIN AND CLAVULANATE POTASSIUM 875; 125 MG/1; MG/1
1 TABLET, FILM COATED ORAL EVERY 12 HOURS SCHEDULED
Status: DISCONTINUED | OUTPATIENT
Start: 2022-01-01 | End: 2022-01-01 | Stop reason: HOSPADM

## 2022-01-01 RX ORDER — FAMOTIDINE 20 MG/1
40 TABLET, FILM COATED ORAL DAILY
Status: DISCONTINUED | OUTPATIENT
Start: 2022-01-01 | End: 2022-01-01 | Stop reason: HOSPADM

## 2022-01-01 RX ORDER — ROCURONIUM BROMIDE 10 MG/ML
50 INJECTION, SOLUTION INTRAVENOUS ONCE
Status: COMPLETED | OUTPATIENT
Start: 2022-01-01 | End: 2022-01-01

## 2022-01-01 RX ORDER — POLYETHYLENE GLYCOL 3350 17 G/17G
17 POWDER, FOR SOLUTION ORAL DAILY PRN
Status: DISCONTINUED | OUTPATIENT
Start: 2022-01-01 | End: 2022-01-01

## 2022-01-01 RX ORDER — DEXTROSE MONOHYDRATE 25 G/50ML
25 INJECTION, SOLUTION INTRAVENOUS
Status: DISCONTINUED | OUTPATIENT
Start: 2022-01-01 | End: 2022-01-01 | Stop reason: HOSPADM

## 2022-01-01 RX ORDER — LORAZEPAM 2 MG/ML
0.5 CONCENTRATE ORAL
Status: DISCONTINUED | OUTPATIENT
Start: 2022-01-01 | End: 2022-01-01 | Stop reason: HOSPADM

## 2022-01-01 RX ORDER — SODIUM CHLORIDE 9 MG/ML
250 INJECTION, SOLUTION INTRAVENOUS ONCE
Status: COMPLETED | OUTPATIENT
Start: 2022-01-01 | End: 2022-01-01

## 2022-01-01 RX ORDER — BISACODYL 10 MG
10 SUPPOSITORY, RECTAL RECTAL DAILY PRN
Status: DISCONTINUED | OUTPATIENT
Start: 2022-01-01 | End: 2022-01-01

## 2022-01-01 RX ORDER — BISACODYL 5 MG/1
5 TABLET, DELAYED RELEASE ORAL DAILY PRN
Status: DISCONTINUED | OUTPATIENT
Start: 2022-01-01 | End: 2022-01-01

## 2022-01-01 RX ORDER — ATROPINE SULFATE 10 MG/ML
2 SOLUTION/ DROPS OPHTHALMIC
Status: DISCONTINUED | OUTPATIENT
Start: 2022-01-01 | End: 2022-01-01 | Stop reason: HOSPADM

## 2022-01-01 RX ORDER — NICOTINE POLACRILEX 4 MG
24 LOZENGE BUCCAL
Status: DISCONTINUED | OUTPATIENT
Start: 2022-01-01 | End: 2022-01-01 | Stop reason: ALTCHOICE

## 2022-01-01 RX ORDER — SODIUM CHLORIDE 0.9 % (FLUSH) 0.9 %
10 SYRINGE (ML) INJECTION AS NEEDED
Status: DISCONTINUED | OUTPATIENT
Start: 2022-01-01 | End: 2022-01-01

## 2022-01-01 RX ORDER — CEFEPIME 1 G/50ML
2 INJECTION, SOLUTION INTRAVENOUS ONCE
Status: COMPLETED | OUTPATIENT
Start: 2022-01-01 | End: 2022-01-01

## 2022-01-01 RX ORDER — MULTIPLE VITAMINS W/ MINERALS TAB 9MG-400MCG
1 TAB ORAL DAILY
Status: DISCONTINUED | OUTPATIENT
Start: 2022-01-01 | End: 2022-01-01 | Stop reason: HOSPADM

## 2022-01-01 RX ORDER — ALBUMIN (HUMAN) 12.5 G/50ML
25 SOLUTION INTRAVENOUS
Status: COMPLETED | OUTPATIENT
Start: 2022-01-01 | End: 2022-01-01

## 2022-01-01 RX ORDER — NALOXONE HCL 0.4 MG/ML
0.2 VIAL (ML) INJECTION ONCE
Status: COMPLETED | OUTPATIENT
Start: 2022-01-01 | End: 2022-01-01

## 2022-01-01 RX ORDER — ONDANSETRON 2 MG/ML
4 INJECTION INTRAMUSCULAR; INTRAVENOUS EVERY 6 HOURS PRN
Status: DISCONTINUED | OUTPATIENT
Start: 2022-01-01 | End: 2022-01-01 | Stop reason: HOSPADM

## 2022-01-01 RX ORDER — NALOXONE HYDROCHLORIDE 0.4 MG/ML
INJECTION, SOLUTION INTRAMUSCULAR; INTRAVENOUS; SUBCUTANEOUS
Status: DISPENSED
Start: 2022-01-01 | End: 2022-01-01

## 2022-01-01 RX ORDER — CHOLECALCIFEROL (VITAMIN D3) 125 MCG
5 CAPSULE ORAL NIGHTLY PRN
Status: DISCONTINUED | OUTPATIENT
Start: 2022-01-01 | End: 2022-01-01 | Stop reason: HOSPADM

## 2022-01-01 RX ORDER — HALOPERIDOL 2 MG/ML
0.5 SOLUTION ORAL
Status: DISCONTINUED | OUTPATIENT
Start: 2022-01-01 | End: 2022-01-01 | Stop reason: HOSPADM

## 2022-01-01 RX ORDER — LACTULOSE 10 G/15ML
30 SOLUTION ORAL 2 TIMES DAILY
Status: DISCONTINUED | OUTPATIENT
Start: 2022-01-01 | End: 2022-01-01

## 2022-01-01 RX ORDER — SODIUM CHLORIDE 9 MG/ML
40 INJECTION, SOLUTION INTRAVENOUS AS NEEDED
Status: DISCONTINUED | OUTPATIENT
Start: 2022-01-01 | End: 2022-01-01 | Stop reason: HOSPADM

## 2022-01-01 RX ORDER — CALCIUM GLUCONATE 20 MG/ML
1 INJECTION, SOLUTION INTRAVENOUS ONCE
Status: COMPLETED | OUTPATIENT
Start: 2022-01-01 | End: 2022-01-01

## 2022-01-01 RX ORDER — SODIUM CHLORIDE 9 MG/ML
250 INJECTION, SOLUTION INTRAVENOUS ONCE
Status: CANCELLED | OUTPATIENT
Start: 2022-01-01

## 2022-01-01 RX ORDER — MORPHINE SULFATE 2 MG/ML
2 INJECTION, SOLUTION INTRAMUSCULAR; INTRAVENOUS
Status: DISCONTINUED | OUTPATIENT
Start: 2022-01-01 | End: 2022-01-01 | Stop reason: HOSPADM

## 2022-01-01 RX ORDER — OLANZAPINE 5 MG/1
5 TABLET ORAL NIGHTLY
Qty: 3 TABLET | Refills: 3 | Status: SHIPPED | OUTPATIENT
Start: 2022-01-01

## 2022-01-01 RX ORDER — DEXMEDETOMIDINE HYDROCHLORIDE 4 UG/ML
.2-1.5 INJECTION, SOLUTION INTRAVENOUS
Status: DISCONTINUED | OUTPATIENT
Start: 2022-01-01 | End: 2022-01-01

## 2022-01-01 RX ORDER — VARENICLINE TARTRATE 1 MG/1
1 TABLET, FILM COATED ORAL 2 TIMES DAILY
Qty: 56 TABLET | Refills: 1 | Status: SHIPPED | OUTPATIENT
Start: 2022-01-01 | End: 2022-01-01 | Stop reason: ALTCHOICE

## 2022-01-01 RX ORDER — LIDOCAINE HYDROCHLORIDE 20 MG/ML
INJECTION, SOLUTION INFILTRATION; PERINEURAL
Status: DISCONTINUED
Start: 2022-01-01 | End: 2022-01-01 | Stop reason: HOSPADM

## 2022-01-01 RX ORDER — LORAZEPAM 2 MG/ML
0.5 INJECTION INTRAMUSCULAR
Status: DISCONTINUED | OUTPATIENT
Start: 2022-01-01 | End: 2022-01-01 | Stop reason: HOSPADM

## 2022-01-01 RX ORDER — DEXTROSE MONOHYDRATE 25 G/50ML
25 INJECTION, SOLUTION INTRAVENOUS
Status: DISCONTINUED | OUTPATIENT
Start: 2022-01-01 | End: 2022-01-01 | Stop reason: ALTCHOICE

## 2022-01-01 RX ORDER — MIDAZOLAM HYDROCHLORIDE 1 MG/ML
INJECTION INTRAMUSCULAR; INTRAVENOUS
Status: COMPLETED | OUTPATIENT
Start: 2022-01-01 | End: 2022-01-01

## 2022-01-01 RX ORDER — AMOXICILLIN 250 MG
2 CAPSULE ORAL 2 TIMES DAILY
Status: DISCONTINUED | OUTPATIENT
Start: 2022-01-01 | End: 2022-01-01

## 2022-01-01 RX ORDER — SODIUM CHLORIDE, SODIUM LACTATE, POTASSIUM CHLORIDE, CALCIUM CHLORIDE 600; 310; 30; 20 MG/100ML; MG/100ML; MG/100ML; MG/100ML
125 INJECTION, SOLUTION INTRAVENOUS CONTINUOUS
Status: DISCONTINUED | OUTPATIENT
Start: 2022-01-01 | End: 2022-01-01

## 2022-01-01 RX ORDER — PALONOSETRON 0.05 MG/ML
0.25 INJECTION, SOLUTION INTRAVENOUS ONCE
Status: COMPLETED | OUTPATIENT
Start: 2022-01-01 | End: 2022-01-01

## 2022-01-01 RX ORDER — INSULIN LISPRO 100 [IU]/ML
0-9 INJECTION, SOLUTION INTRAVENOUS; SUBCUTANEOUS
Status: DISCONTINUED | OUTPATIENT
Start: 2022-01-01 | End: 2022-01-01 | Stop reason: ALTCHOICE

## 2022-01-01 RX ORDER — NOREPINEPHRINE BIT/0.9 % NACL 8 MG/250ML
.02-.3 INFUSION BOTTLE (ML) INTRAVENOUS
Status: DISCONTINUED | OUTPATIENT
Start: 2022-01-01 | End: 2022-01-01 | Stop reason: SDUPTHER

## 2022-01-01 RX ORDER — SODIUM CHLORIDE, SODIUM LACTATE, POTASSIUM CHLORIDE, CALCIUM CHLORIDE 600; 310; 30; 20 MG/100ML; MG/100ML; MG/100ML; MG/100ML
100 INJECTION, SOLUTION INTRAVENOUS CONTINUOUS
Status: DISCONTINUED | OUTPATIENT
Start: 2022-01-01 | End: 2022-01-01

## 2022-01-01 RX ORDER — FENTANYL CITRATE 50 UG/ML
50 INJECTION, SOLUTION INTRAMUSCULAR; INTRAVENOUS
Status: DISCONTINUED | OUTPATIENT
Start: 2022-01-01 | End: 2022-01-01 | Stop reason: HOSPADM

## 2022-01-01 RX ORDER — ACETAMINOPHEN 650 MG/1
650 SUPPOSITORY RECTAL EVERY 4 HOURS PRN
Status: DISCONTINUED | OUTPATIENT
Start: 2022-01-01 | End: 2022-01-01

## 2022-01-01 RX ORDER — CHLORHEXIDINE GLUCONATE 0.12 MG/ML
15 RINSE ORAL EVERY 12 HOURS SCHEDULED
Status: DISCONTINUED | OUTPATIENT
Start: 2022-01-01 | End: 2022-01-01

## 2022-01-01 RX ORDER — MIDAZOLAM HYDROCHLORIDE 1 MG/ML
INJECTION INTRAMUSCULAR; INTRAVENOUS
Status: DISPENSED
Start: 2022-01-01 | End: 2022-01-01

## 2022-01-01 RX ORDER — MIDAZOLAM HYDROCHLORIDE 1 MG/ML
5 INJECTION INTRAMUSCULAR; INTRAVENOUS ONCE
Status: COMPLETED | OUTPATIENT
Start: 2022-01-01 | End: 2022-01-01

## 2022-01-01 RX ORDER — DEXTROSE MONOHYDRATE 25 G/50ML
10-50 INJECTION, SOLUTION INTRAVENOUS
Status: DISCONTINUED | OUTPATIENT
Start: 2022-01-01 | End: 2022-01-01

## 2022-01-01 RX ORDER — DEXMEDETOMIDINE HYDROCHLORIDE 4 UG/ML
INJECTION, SOLUTION INTRAVENOUS
Status: DISPENSED
Start: 2022-01-01 | End: 2022-01-01

## 2022-01-01 RX ORDER — ONDANSETRON HYDROCHLORIDE 8 MG/1
8 TABLET, FILM COATED ORAL 3 TIMES DAILY PRN
Qty: 30 TABLET | Refills: 5 | Status: SHIPPED | OUTPATIENT
Start: 2022-01-01

## 2022-01-01 RX ORDER — NOREPINEPHRINE BIT/0.9 % NACL 8 MG/250ML
.02-3 INFUSION BOTTLE (ML) INTRAVENOUS
Status: DISCONTINUED | OUTPATIENT
Start: 2022-01-01 | End: 2022-01-01

## 2022-01-01 RX ORDER — SODIUM CHLORIDE 0.9 % (FLUSH) 0.9 %
10 SYRINGE (ML) INJECTION EVERY 12 HOURS SCHEDULED
Status: DISCONTINUED | OUTPATIENT
Start: 2022-01-01 | End: 2022-01-01

## 2022-01-01 RX ORDER — ALBUMIN (HUMAN) 12.5 G/50ML
50 SOLUTION INTRAVENOUS ONCE
Status: DISCONTINUED | OUTPATIENT
Start: 2022-01-01 | End: 2022-01-01

## 2022-01-01 RX ORDER — SODIUM CHLORIDE 9 MG/ML
75 INJECTION, SOLUTION INTRAVENOUS CONTINUOUS
Status: DISCONTINUED | OUTPATIENT
Start: 2022-01-01 | End: 2022-01-01

## 2022-01-01 RX ORDER — AMLODIPINE BESYLATE 10 MG/1
10 TABLET ORAL DAILY
Qty: 30 TABLET | Refills: 5 | Status: SHIPPED | OUTPATIENT
Start: 2022-01-01

## 2022-01-01 RX ORDER — DEXTROSE MONOHYDRATE 25 G/50ML
50 INJECTION, SOLUTION INTRAVENOUS ONCE
Status: COMPLETED | OUTPATIENT
Start: 2022-01-01 | End: 2022-01-01

## 2022-01-01 RX ORDER — DEXTROSE MONOHYDRATE 25 G/50ML
INJECTION, SOLUTION INTRAVENOUS
Status: COMPLETED
Start: 2022-01-01 | End: 2022-01-01

## 2022-01-01 RX ORDER — ACETAMINOPHEN 160 MG/5ML
650 SOLUTION ORAL EVERY 4 HOURS PRN
Status: DISCONTINUED | OUTPATIENT
Start: 2022-01-01 | End: 2022-01-01

## 2022-01-01 RX ORDER — OXYCODONE HYDROCHLORIDE 5 MG/1
5 TABLET ORAL EVERY 4 HOURS PRN
Status: DISCONTINUED | OUTPATIENT
Start: 2022-01-01 | End: 2022-01-01

## 2022-01-01 RX ORDER — NICOTINE POLACRILEX 4 MG
24 LOZENGE BUCCAL
Status: DISCONTINUED | OUTPATIENT
Start: 2022-01-01 | End: 2022-01-01 | Stop reason: HOSPADM

## 2022-01-01 RX ORDER — HALOPERIDOL 5 MG/ML
1 INJECTION INTRAMUSCULAR
Status: DISCONTINUED | OUTPATIENT
Start: 2022-01-01 | End: 2022-01-01 | Stop reason: HOSPADM

## 2022-01-01 RX ORDER — NICOTINE POLACRILEX 4 MG
15 LOZENGE BUCCAL
Status: DISCONTINUED | OUTPATIENT
Start: 2022-01-01 | End: 2022-01-01

## 2022-01-01 RX ORDER — SODIUM BICARBONATE 650 MG/1
1300 TABLET ORAL 4 TIMES DAILY
Status: DISCONTINUED | OUTPATIENT
Start: 2022-01-01 | End: 2022-01-01

## 2022-01-01 RX ORDER — DICLOFENAC SODIUM 75 MG/1
75 TABLET, DELAYED RELEASE ORAL 2 TIMES DAILY
Qty: 60 TABLET | Refills: 1 | Status: SHIPPED | OUTPATIENT
Start: 2022-01-01 | End: 2022-01-01 | Stop reason: SDDI

## 2022-01-01 RX ORDER — FAMOTIDINE 10 MG/ML
20 INJECTION, SOLUTION INTRAVENOUS ONCE
Status: COMPLETED | OUTPATIENT
Start: 2022-01-01 | End: 2022-01-01

## 2022-01-01 RX ORDER — FENTANYL CITRATE 50 UG/ML
INJECTION, SOLUTION INTRAMUSCULAR; INTRAVENOUS
Status: COMPLETED | OUTPATIENT
Start: 2022-01-01 | End: 2022-01-01

## 2022-01-01 RX ORDER — INSULIN LISPRO 100 [IU]/ML
0-7 INJECTION, SOLUTION INTRAVENOUS; SUBCUTANEOUS
Status: DISCONTINUED | OUTPATIENT
Start: 2022-01-01 | End: 2022-01-01 | Stop reason: HOSPADM

## 2022-01-01 RX ORDER — ONDANSETRON 2 MG/ML
4 INJECTION INTRAMUSCULAR; INTRAVENOUS ONCE
Status: COMPLETED | OUTPATIENT
Start: 2022-01-01 | End: 2022-01-01

## 2022-01-01 RX ORDER — PROCHLORPERAZINE MALEATE 10 MG
10 TABLET ORAL EVERY 6 HOURS PRN
Qty: 20 TABLET | Refills: 3 | Status: SHIPPED | OUTPATIENT
Start: 2022-01-01

## 2022-01-01 RX ORDER — CYCLOBENZAPRINE HCL 10 MG
10 TABLET ORAL 3 TIMES DAILY PRN
Qty: 90 TABLET | Refills: 0 | Status: SHIPPED | OUTPATIENT
Start: 2022-01-01

## 2022-01-01 RX ORDER — MORPHINE SULFATE 20 MG/ML
5 SOLUTION ORAL
Status: DISCONTINUED | OUTPATIENT
Start: 2022-01-01 | End: 2022-01-01 | Stop reason: HOSPADM

## 2022-01-01 RX ORDER — OLANZAPINE 5 MG/1
5 TABLET ORAL ONCE
Status: COMPLETED | OUTPATIENT
Start: 2022-01-01 | End: 2022-01-01

## 2022-01-01 RX ADMIN — SODIUM CHLORIDE, POTASSIUM CHLORIDE, SODIUM LACTATE AND CALCIUM CHLORIDE 125 ML/HR: 600; 310; 30; 20 INJECTION, SOLUTION INTRAVENOUS at 02:08

## 2022-01-01 RX ADMIN — SENNOSIDES AND DOCUSATE SODIUM 2 TABLET: 50; 8.6 TABLET ORAL at 21:15

## 2022-01-01 RX ADMIN — INSULIN HUMAN 3.3 UNITS/HR: 1 INJECTION, SOLUTION INTRAVENOUS at 06:38

## 2022-01-01 RX ADMIN — Medication 0.12 MCG/KG/MIN: at 18:35

## 2022-01-01 RX ADMIN — RIFAXIMIN 400 MG: 200 TABLET ORAL at 13:55

## 2022-01-01 RX ADMIN — SODIUM BICARBONATE 150 MEQ: 84 INJECTION INTRAVENOUS at 20:34

## 2022-01-01 RX ADMIN — CEFEPIME 2 G: 1 INJECTION, SOLUTION INTRAVENOUS at 01:17

## 2022-01-01 RX ADMIN — PROPOFOL 5 MCG/KG/MIN: 10 INJECTION, EMULSION INTRAVENOUS at 08:43

## 2022-01-01 RX ADMIN — SODIUM CHLORIDE 75 ML/HR: 9 INJECTION, SOLUTION INTRAVENOUS at 22:45

## 2022-01-01 RX ADMIN — RIFAXIMIN 400 MG: 200 TABLET ORAL at 22:27

## 2022-01-01 RX ADMIN — SODIUM CHLORIDE, POTASSIUM CHLORIDE, SODIUM LACTATE AND CALCIUM CHLORIDE 100 ML/HR: 600; 310; 30; 20 INJECTION, SOLUTION INTRAVENOUS at 09:14

## 2022-01-01 RX ADMIN — CEFEPIME 2 G: 1 INJECTION, SOLUTION INTRAVENOUS at 23:54

## 2022-01-01 RX ADMIN — MIDAZOLAM HYDROCHLORIDE 1 MG: 1 INJECTION, SOLUTION INTRAMUSCULAR; INTRAVENOUS at 15:16

## 2022-01-01 RX ADMIN — DEXTROSE MONOHYDRATE 25 ML: 25 INJECTION, SOLUTION INTRAVENOUS at 02:57

## 2022-01-01 RX ADMIN — SODIUM BICARBONATE 150 MEQ: 84 INJECTION, SOLUTION INTRAVENOUS at 07:06

## 2022-01-01 RX ADMIN — IOPAMIDOL 100 ML: 755 INJECTION, SOLUTION INTRAVENOUS at 14:59

## 2022-01-01 RX ADMIN — VASOPRESSIN 0.04 UNITS/MIN: 0.2 INJECTION INTRAVENOUS at 03:02

## 2022-01-01 RX ADMIN — RIFAXIMIN 400 MG: 200 TABLET ORAL at 13:47

## 2022-01-01 RX ADMIN — SODIUM ZIRCONIUM CYCLOSILICATE 10 G: 10 POWDER, FOR SUSPENSION ORAL at 09:26

## 2022-01-01 RX ADMIN — SODIUM BICARBONATE 650 MG TABLET 1300 MG: at 22:12

## 2022-01-01 RX ADMIN — SENNOSIDES AND DOCUSATE SODIUM 2 TABLET: 50; 8.6 TABLET ORAL at 08:20

## 2022-01-01 RX ADMIN — HYDROMORPHONE HYDROCHLORIDE 0.5 MG: 1 INJECTION, SOLUTION INTRAMUSCULAR; INTRAVENOUS; SUBCUTANEOUS at 02:50

## 2022-01-01 RX ADMIN — Medication 0.05 MCG/KG/MIN: at 06:57

## 2022-01-01 RX ADMIN — PROPOFOL 45 MCG/KG/MIN: 10 INJECTION, EMULSION INTRAVENOUS at 08:20

## 2022-01-01 RX ADMIN — VASOPRESSIN 0.04 UNITS/MIN: 0.2 INJECTION INTRAVENOUS at 18:03

## 2022-01-01 RX ADMIN — KETAMINE HYDROCHLORIDE 200 MG: 50 INJECTION INTRAMUSCULAR; INTRAVENOUS at 07:00

## 2022-01-01 RX ADMIN — SODIUM BICARBONATE 150 MEQ: 84 INJECTION INTRAVENOUS at 07:07

## 2022-01-01 RX ADMIN — Medication 0.18 MCG/KG/MIN: at 03:03

## 2022-01-01 RX ADMIN — Medication 10 ML: at 08:53

## 2022-01-01 RX ADMIN — SENNOSIDES AND DOCUSATE SODIUM 2 TABLET: 50; 8.6 TABLET ORAL at 08:17

## 2022-01-01 RX ADMIN — CHLORHEXIDINE GLUCONATE 15 ML: 1.2 RINSE ORAL at 08:09

## 2022-01-01 RX ADMIN — FENTANYL CITRATE 50 MCG: 50 INJECTION, SOLUTION INTRAMUSCULAR; INTRAVENOUS at 22:27

## 2022-01-01 RX ADMIN — NALOXONE HYDROCHLORIDE 0.2 MG: 0.4 INJECTION, SOLUTION INTRAMUSCULAR; INTRAVENOUS; SUBCUTANEOUS at 05:46

## 2022-01-01 RX ADMIN — INSULIN LISPRO 4 UNITS: 100 INJECTION, SOLUTION INTRAVENOUS; SUBCUTANEOUS at 11:15

## 2022-01-01 RX ADMIN — ROCURONIUM BROMIDE 50 MG: 10 INJECTION INTRAVENOUS at 07:00

## 2022-01-01 RX ADMIN — GADOBENATE DIMEGLUMINE 18 ML: 529 INJECTION, SOLUTION INTRAVENOUS at 22:17

## 2022-01-01 RX ADMIN — INSULIN HUMAN 10 UNITS: 100 INJECTION, SOLUTION PARENTERAL at 08:20

## 2022-01-01 RX ADMIN — SODIUM BICARBONATE 50 MEQ: 84 INJECTION, SOLUTION INTRAVENOUS at 06:45

## 2022-01-01 RX ADMIN — Medication 0.24 MCG/KG/MIN: at 18:03

## 2022-01-01 RX ADMIN — CHLORHEXIDINE GLUCONATE 15 ML: 1.2 RINSE ORAL at 22:30

## 2022-01-01 RX ADMIN — Medication 10 ML: at 08:09

## 2022-01-01 RX ADMIN — CALCIUM GLUCONATE 1 G: 20 INJECTION, SOLUTION INTRAVENOUS at 06:40

## 2022-01-01 RX ADMIN — ETOPOSIDE 100 MG: 20 INJECTION INTRAVENOUS at 15:29

## 2022-01-01 RX ADMIN — DEXTROSE MONOHYDRATE 50 ML: 25 INJECTION, SOLUTION INTRAVENOUS at 06:40

## 2022-01-01 RX ADMIN — CEFEPIME 2 G: 1 INJECTION, SOLUTION INTRAVENOUS at 11:36

## 2022-01-01 RX ADMIN — ALBUMIN HUMAN 25 G: 0.25 SOLUTION INTRAVENOUS at 07:56

## 2022-01-01 RX ADMIN — PROPOFOL 45 MCG/KG/MIN: 10 INJECTION, EMULSION INTRAVENOUS at 04:36

## 2022-01-01 RX ADMIN — SODIUM BICARBONATE 650 MG TABLET 1300 MG: at 11:19

## 2022-01-01 RX ADMIN — Medication 0.26 MCG/KG/MIN: at 00:28

## 2022-01-01 RX ADMIN — SODIUM CHLORIDE 1000 ML: 9 INJECTION, SOLUTION INTRAVENOUS at 12:03

## 2022-01-01 RX ADMIN — PROPOFOL 30 MCG/KG/MIN: 10 INJECTION, EMULSION INTRAVENOUS at 21:20

## 2022-01-01 RX ADMIN — VASOPRESSIN 0.04 UNITS/MIN: 0.2 INJECTION INTRAVENOUS at 00:26

## 2022-01-01 RX ADMIN — VASOPRESSIN 0.03 UNITS/MIN: 0.2 INJECTION INTRAVENOUS at 18:36

## 2022-01-01 RX ADMIN — PALONOSETRON HYDROCHLORIDE 0.25 MG: 0.25 INJECTION INTRAVENOUS at 13:52

## 2022-01-01 RX ADMIN — FENTANYL CITRATE 50 MCG: 50 INJECTION, SOLUTION INTRAMUSCULAR; INTRAVENOUS at 15:18

## 2022-01-01 RX ADMIN — LACTULOSE 30 G: 20 SOLUTION ORAL at 08:32

## 2022-01-01 RX ADMIN — SODIUM CHLORIDE 1000 ML: 9 INJECTION, SOLUTION INTRAVENOUS at 18:55

## 2022-01-01 RX ADMIN — FAMOTIDINE 20 MG: 10 INJECTION INTRAVENOUS at 12:03

## 2022-01-01 RX ADMIN — FAMOTIDINE 20 MG: 10 INJECTION INTRAVENOUS at 21:15

## 2022-01-01 RX ADMIN — Medication 2000 MG: at 01:12

## 2022-01-01 RX ADMIN — FENTANYL CITRATE 50 MCG: 50 INJECTION, SOLUTION INTRAMUSCULAR; INTRAVENOUS at 19:15

## 2022-01-01 RX ADMIN — SODIUM CHLORIDE 1000 ML: 9 INJECTION, SOLUTION INTRAVENOUS at 18:43

## 2022-01-01 RX ADMIN — RIFAXIMIN 400 MG: 200 TABLET ORAL at 17:17

## 2022-01-01 RX ADMIN — SODIUM CHLORIDE, POTASSIUM CHLORIDE, SODIUM LACTATE AND CALCIUM CHLORIDE 125 ML/HR: 600; 310; 30; 20 INJECTION, SOLUTION INTRAVENOUS at 09:42

## 2022-01-01 RX ADMIN — PROPOFOL 45 MCG/KG/MIN: 10 INJECTION, EMULSION INTRAVENOUS at 00:27

## 2022-01-01 RX ADMIN — SODIUM CHLORIDE 1000 ML: 9 INJECTION, SOLUTION INTRAVENOUS at 14:17

## 2022-01-01 RX ADMIN — FUROSEMIDE 40 MG: 10 INJECTION, SOLUTION INTRAMUSCULAR; INTRAVENOUS at 05:26

## 2022-01-01 RX ADMIN — SENNOSIDES AND DOCUSATE SODIUM 2 TABLET: 50; 8.6 TABLET ORAL at 08:32

## 2022-01-01 RX ADMIN — CEFEPIME 2 G: 1 INJECTION, SOLUTION INTRAVENOUS at 11:20

## 2022-01-01 RX ADMIN — SODIUM BICARBONATE 650 MG TABLET 1300 MG: at 17:17

## 2022-01-01 RX ADMIN — ENOXAPARIN SODIUM 40 MG: 100 INJECTION SUBCUTANEOUS at 09:14

## 2022-01-01 RX ADMIN — Medication 10 ML: at 09:27

## 2022-01-01 RX ADMIN — SODIUM CHLORIDE 100 ML: 9 INJECTION, SOLUTION INTRAVENOUS at 14:16

## 2022-01-01 RX ADMIN — FAMOTIDINE 20 MG: 10 INJECTION INTRAVENOUS at 10:11

## 2022-01-01 RX ADMIN — SODIUM ZIRCONIUM CYCLOSILICATE 10 G: 10 POWDER, FOR SUSPENSION ORAL at 08:26

## 2022-01-01 RX ADMIN — CEFEPIME 2 G: 1 INJECTION, SOLUTION INTRAVENOUS at 00:28

## 2022-01-01 RX ADMIN — CEFEPIME 2 G: 1 INJECTION, SOLUTION INTRAVENOUS at 00:23

## 2022-01-01 RX ADMIN — CARBOPLATIN 500 MG: 10 INJECTION, SOLUTION INTRAVENOUS at 14:50

## 2022-01-01 RX ADMIN — VANCOMYCIN HYDROCHLORIDE 750 MG: 5 INJECTION, POWDER, LYOPHILIZED, FOR SOLUTION INTRAVENOUS at 21:00

## 2022-01-01 RX ADMIN — PROPOFOL 45 MCG/KG/MIN: 10 INJECTION, EMULSION INTRAVENOUS at 16:26

## 2022-01-01 RX ADMIN — GADOBENATE DIMEGLUMINE 16 ML: 529 INJECTION, SOLUTION INTRAVENOUS at 13:37

## 2022-01-01 RX ADMIN — INSULIN LISPRO 2 UNITS: 100 INJECTION, SOLUTION INTRAVENOUS; SUBCUTANEOUS at 08:32

## 2022-01-01 RX ADMIN — FAMOTIDINE 20 MG: 10 INJECTION INTRAVENOUS at 08:26

## 2022-01-01 RX ADMIN — DEXTROSE MONOHYDRATE 50 ML: 25 INJECTION, SOLUTION INTRAVENOUS at 05:20

## 2022-01-01 RX ADMIN — CEFEPIME 2 G: 1 INJECTION, SOLUTION INTRAVENOUS at 11:58

## 2022-01-01 RX ADMIN — OXYCODONE HYDROCHLORIDE 5 MG: 5 TABLET ORAL at 23:50

## 2022-01-01 RX ADMIN — Medication 0.26 MCG/KG/MIN: at 06:18

## 2022-01-01 RX ADMIN — IOPAMIDOL 100 ML: 755 INJECTION, SOLUTION INTRAVENOUS at 19:05

## 2022-01-01 RX ADMIN — Medication 150 MEQ: at 07:06

## 2022-01-01 RX ADMIN — DEXTROSE MONOHYDRATE 50 ML: 25 INJECTION, SOLUTION INTRAVENOUS at 08:19

## 2022-01-01 RX ADMIN — SODIUM BICARBONATE 50 MEQ: 84 INJECTION, SOLUTION INTRAVENOUS at 08:19

## 2022-01-01 RX ADMIN — VASOPRESSIN 0.04 UNITS/MIN: 0.2 INJECTION INTRAVENOUS at 10:17

## 2022-01-01 RX ADMIN — VASOPRESSIN 0.03 UNITS/MIN: 0.2 INJECTION INTRAVENOUS at 10:11

## 2022-01-01 RX ADMIN — MIDAZOLAM HYDROCHLORIDE 5 MG: 1 INJECTION, SOLUTION INTRAMUSCULAR; INTRAVENOUS at 06:58

## 2022-01-01 RX ADMIN — INSULIN HUMAN 10 UNITS: 100 INJECTION, SOLUTION PARENTERAL at 06:40

## 2022-01-01 RX ADMIN — LACTULOSE 30 G: 20 SOLUTION ORAL at 21:15

## 2022-01-01 RX ADMIN — RIFAXIMIN 400 MG: 200 TABLET ORAL at 22:12

## 2022-01-01 RX ADMIN — ONDANSETRON 4 MG: 2 INJECTION INTRAMUSCULAR; INTRAVENOUS at 12:03

## 2022-01-01 RX ADMIN — CHLORHEXIDINE GLUCONATE 15 ML: 1.2 RINSE ORAL at 21:15

## 2022-01-01 RX ADMIN — SODIUM CHLORIDE, POTASSIUM CHLORIDE, SODIUM LACTATE AND CALCIUM CHLORIDE 125 ML/HR: 600; 310; 30; 20 INJECTION, SOLUTION INTRAVENOUS at 04:25

## 2022-01-01 RX ADMIN — FAMOTIDINE 20 MG: 10 INJECTION INTRAVENOUS at 22:31

## 2022-01-01 RX ADMIN — LACTULOSE 30 G: 20 SOLUTION ORAL at 22:26

## 2022-01-01 RX ADMIN — AMOXICILLIN AND CLAVULANATE POTASSIUM 1 TABLET: 875; 125 TABLET, FILM COATED ORAL at 08:53

## 2022-01-01 RX ADMIN — OLANZAPINE 5 MG: 5 TABLET, FILM COATED ORAL at 13:54

## 2022-01-01 RX ADMIN — Medication 5 MG: at 22:12

## 2022-01-01 RX ADMIN — CHLORHEXIDINE GLUCONATE 15 ML: 1.2 RINSE ORAL at 08:30

## 2022-01-01 RX ADMIN — DEXAMETHASONE SODIUM PHOSPHATE 12 MG: 4 INJECTION, SOLUTION INTRA-ARTICULAR; INTRALESIONAL; INTRAMUSCULAR; INTRAVENOUS; SOFT TISSUE at 13:59

## 2022-01-01 RX ADMIN — LACTULOSE 30 G: 20 SOLUTION ORAL at 08:26

## 2022-01-01 RX ADMIN — Medication 10 ML: at 09:42

## 2022-01-01 RX ADMIN — SENNOSIDES AND DOCUSATE SODIUM 2 TABLET: 50; 8.6 TABLET ORAL at 11:18

## 2022-01-01 RX ADMIN — SENNOSIDES AND DOCUSATE SODIUM 2 TABLET: 50; 8.6 TABLET ORAL at 22:26

## 2022-01-01 RX ADMIN — PROPOFOL 45 MCG/KG/MIN: 10 INJECTION, EMULSION INTRAVENOUS at 20:06

## 2022-01-01 RX ADMIN — LACTULOSE 30 G: 20 SOLUTION ORAL at 11:18

## 2022-01-01 RX ADMIN — FENTANYL CITRATE 50 MCG: 50 INJECTION, SOLUTION INTRAMUSCULAR; INTRAVENOUS at 02:46

## 2022-01-01 RX ADMIN — SODIUM CHLORIDE 250 ML: 9 INJECTION, SOLUTION INTRAVENOUS at 13:50

## 2022-01-01 RX ADMIN — MORPHINE SULFATE 2 MG: 2 INJECTION, SOLUTION INTRAMUSCULAR; INTRAVENOUS at 11:40

## 2022-01-01 RX ADMIN — INSULIN LISPRO 2 UNITS: 100 INJECTION, SOLUTION INTRAVENOUS; SUBCUTANEOUS at 17:06

## 2022-01-01 RX ADMIN — RIFAXIMIN 400 MG: 200 TABLET ORAL at 21:15

## 2022-01-01 RX ADMIN — Medication 10 ML: at 08:20

## 2022-01-01 RX ADMIN — ENOXAPARIN SODIUM 40 MG: 100 INJECTION SUBCUTANEOUS at 09:52

## 2022-01-01 RX ADMIN — SODIUM BICARBONATE 50 MEQ: 84 INJECTION, SOLUTION INTRAVENOUS at 05:20

## 2022-01-01 RX ADMIN — SODIUM CHLORIDE 50 ML/HR: 9 INJECTION, SOLUTION INTRAVENOUS at 03:16

## 2022-01-01 RX ADMIN — RIFAXIMIN 400 MG: 200 TABLET ORAL at 05:51

## 2022-01-01 RX ADMIN — LACTULOSE 30 G: 20 SOLUTION ORAL at 08:20

## 2022-01-01 RX ADMIN — SODIUM BICARBONATE 150 MEQ: 84 INJECTION INTRAVENOUS at 02:48

## 2022-01-01 RX ADMIN — SODIUM CHLORIDE, POTASSIUM CHLORIDE, SODIUM LACTATE AND CALCIUM CHLORIDE 3000 ML: 600; 310; 30; 20 INJECTION, SOLUTION INTRAVENOUS at 07:23

## 2022-01-01 RX ADMIN — SODIUM CHLORIDE, POTASSIUM CHLORIDE, SODIUM LACTATE AND CALCIUM CHLORIDE 125 ML/HR: 600; 310; 30; 20 INJECTION, SOLUTION INTRAVENOUS at 20:43

## 2022-01-01 RX ADMIN — SODIUM ZIRCONIUM CYCLOSILICATE 10 G: 10 POWDER, FOR SUSPENSION ORAL at 22:26

## 2022-01-01 RX ADMIN — SODIUM BICARBONATE 150 MEQ: 84 INJECTION INTRAVENOUS at 13:53

## 2022-01-01 RX ADMIN — SODIUM ZIRCONIUM CYCLOSILICATE 10 G: 10 POWDER, FOR SUSPENSION ORAL at 17:06

## 2022-01-01 RX ADMIN — INSULIN HUMAN 10 UNITS: 100 INJECTION, SOLUTION PARENTERAL at 05:21

## 2022-01-01 RX ADMIN — VANCOMYCIN HYDROCHLORIDE 1500 MG: 5 INJECTION, POWDER, LYOPHILIZED, FOR SOLUTION INTRAVENOUS at 23:54

## 2022-01-01 RX ADMIN — FAMOTIDINE 20 MG: 10 INJECTION INTRAVENOUS at 08:18

## 2022-01-01 RX ADMIN — PROPOFOL 45 MCG/KG/MIN: 10 INJECTION, EMULSION INTRAVENOUS at 04:25

## 2022-01-01 RX ADMIN — PROPOFOL 45 MCG/KG/MIN: 10 INJECTION, EMULSION INTRAVENOUS at 12:32

## 2022-01-01 RX ADMIN — Medication 10 ML: at 02:07

## 2022-01-01 RX ADMIN — Medication 10 ML: at 20:07

## 2022-01-01 RX ADMIN — SODIUM CHLORIDE, POTASSIUM CHLORIDE, SODIUM LACTATE AND CALCIUM CHLORIDE 100 ML/HR: 600; 310; 30; 20 INJECTION, SOLUTION INTRAVENOUS at 18:35

## 2022-01-01 RX ADMIN — PROPOFOL 45 MCG/KG/MIN: 10 INJECTION, EMULSION INTRAVENOUS at 08:19

## 2022-01-01 RX ADMIN — CALCIUM GLUCONATE 1 G: 20 INJECTION, SOLUTION INTRAVENOUS at 08:30

## 2022-01-01 RX ADMIN — SODIUM ZIRCONIUM CYCLOSILICATE 10 G: 10 POWDER, FOR SUSPENSION ORAL at 06:40

## 2022-01-01 RX ADMIN — PROPOFOL 45 MCG/KG/MIN: 10 INJECTION, EMULSION INTRAVENOUS at 00:31

## 2022-01-01 RX ADMIN — VASOPRESSIN 0.04 UNITS/MIN: 0.2 INJECTION INTRAVENOUS at 09:52

## 2022-01-01 RX ADMIN — FENTANYL CITRATE 50 MCG: 50 INJECTION, SOLUTION INTRAMUSCULAR; INTRAVENOUS at 11:38

## 2022-01-01 RX ADMIN — ENOXAPARIN SODIUM 40 MG: 100 INJECTION SUBCUTANEOUS at 09:37

## 2022-01-01 RX ADMIN — Medication 10 ML: at 22:30

## 2022-01-01 RX ADMIN — Medication 10 ML: at 20:15

## 2022-01-01 RX ADMIN — Medication 0.16 MCG/KG/MIN: at 10:17

## 2022-01-01 RX ADMIN — RIFAXIMIN 400 MG: 200 TABLET ORAL at 06:19

## 2022-01-01 RX ADMIN — CHLORHEXIDINE GLUCONATE 15 ML: 1.2 RINSE ORAL at 09:27

## 2022-01-01 RX ADMIN — ALBUMIN HUMAN 25 G: 0.25 SOLUTION INTRAVENOUS at 08:03

## 2022-01-01 RX ADMIN — LACTULOSE 30 G: 20 SOLUTION ORAL at 22:12

## 2022-01-01 RX ADMIN — VANCOMYCIN HYDROCHLORIDE 1000 MG: 5 INJECTION, POWDER, LYOPHILIZED, FOR SOLUTION INTRAVENOUS at 21:20

## 2022-02-22 NOTE — PROGRESS NOTES
Discussed positive COVID results with Karen Goldstein  1960 using COVID-19 results scripting. Educated on the CDC guidance for quarantining. Advised patient to follow up with the PCP if symptoms worsen or medical treatment is needed. Advised to go to the ED if emergent needs arise. Addressed all questions and concerns.

## 2022-02-22 NOTE — ED PROVIDER NOTES
Time: 11:19 AM EST  Arrived by: private car  Chief Complaint: abdominal pain, nausea  History provided by: pt  History is limited by: N/A     History of Present Illness:    Karen Goldstein is a 61 y.o. female who presents to the emergency department today with complaints of abdominal pain over the past week and a half. In addition to the abdominal pain she complains of nausea, diarrhea, and decreased PO intake. She denies anyone else around her feeling ill. She has no other complaints at this time. She denies chest pain, fever, emesis, diaphoresis, headache, weakness, or shortness of breath.           History provided by:  Patient   used: No        Patient Care Team  Primary Care Provider: Lakshmi Gibson MD    Past Medical History:     Allergies   Allergen Reactions   • Codeine Nausea And Vomiting   • Ibuprofen GI Intolerance     Past Medical History:   Diagnosis Date   • Arthritis    • Cervicalgia 03/07/2017   • Diabetes mellitus (HCC)    • DM2 (diabetes mellitus, type 2) ([facility])    • Essential hypertension    • Hyperlipidemia    • Hypertension    • Hypertension    • Lumbago 03/07/2017    low back pain   • Paresthesia 03/07/2017   • Sciatica 03/07/2017     Past Surgical History:   Procedure Laterality Date   • BACK SURGERY     • OTHER SURGICAL HISTORY      discectomy   • TONSILLECTOMY       Family History   Problem Relation Age of Onset   • Other Father         spine problems   • Arthritis Father    • Other Sister         spine problems   • Arthritis Sister        Home Medications:  Prior to Admission medications    Medication Sig Start Date End Date Taking? Authorizing Provider   amLODIPine (NORVASC) 10 MG tablet Take 1 tablet by mouth Daily. 9/3/21   Lakshmi Gibson MD   Apple Cider Vinegar 188 MG capsule     Emergency, Nurse Jazz, RN   ascorbic acid (VITAMIN C) 1000 MG tablet Vitamin C 1,000 mg oral tablet take 1 tablet by oral route 2 times a day   Active    Emergency,  Nurse LAITH Schulz   atenolol (TENORMIN) 25 MG tablet Take 1 tablet by mouth Daily. 9/3/21   Lakshmi Gibson MD   B Complex Vitamins (B COMPLEX-B12 PO) Vitamins B Complex oral capsule take 1 capsule by oral route daily   Active    Emergency, Nurse Epic, RN   Biotin 10 MG capsule biotin 10,000 mcg oral capsule take 1 capsule by oral route 2 times a day   Active    Emergency, Nurse Jazz RN   Cholecalciferol 125 MCG (5000 UT) tablet Vitamin D3 125 mcg (5,000 unit) oral tablet take 1 tablet by oral route 2 times a day   Active    Emergency, Nurse Jazz RN   Coenzyme Q10 200 MG capsule Co Q-10 200 mg oral capsule take 1 capsule by oral route daily   Active    Emergency, Nurse Epic, RN   cyclobenzaprine (FLEXERIL) 10 MG tablet Take 1 tablet by mouth 3 (Three) Times a Day As Needed for Muscle Spasms. 9/3/21   Lakshmi Gibson MD   lisinopril (PRINIVIL,ZESTRIL) 40 MG tablet Take 1 tablet by mouth Daily. 9/3/21   Lakshmi Gibson MD   metFORMIN (GLUCOPHAGE) 500 MG tablet Take 1 tablet by mouth Daily With Breakfast. 9/3/21   Lakshmi Gibson MD   Zinc 100 MG tablet zinc 50 mg oral tablet take 1 tablet by oral route daily   Active    Emergency, Nurse LAITH Schulz        Social History:   Social History     Tobacco Use   • Smoking status: Current Every Day Smoker     Packs/day: 1.00     Years: 20.00     Pack years: 20.00     Types: Cigarettes   • Smokeless tobacco: Never Used   • Tobacco comment: smoked 11-20 years   Vaping Use   • Vaping Use: Never used   Substance Use Topics   • Alcohol use: Yes     Comment: rare; less than 1 drink per day; been drinking 21-20 years    • Drug use: Never     Recent travel: no     Review of Systems:  Review of Systems   Constitutional: Positive for appetite change. Negative for chills and fever.   HENT: Negative for congestion, rhinorrhea and sore throat.    Eyes: Negative for pain and visual disturbance.   Respiratory: Negative for apnea, cough, chest  "tightness and shortness of breath.    Cardiovascular: Negative for chest pain and palpitations.   Gastrointestinal: Positive for abdominal pain, diarrhea and nausea. Negative for vomiting.   Genitourinary: Negative for difficulty urinating and dysuria.   Musculoskeletal: Negative for joint swelling and myalgias.   Skin: Negative for color change.   Neurological: Negative for seizures and headaches.   Psychiatric/Behavioral: Negative.    All other systems reviewed and are negative.       Physical Exam:  /75   Pulse 96   Temp 99.7 °F (37.6 °C)   Resp 18   Ht 167.6 cm (66\")   Wt 92.2 kg (203 lb 4.2 oz)   SpO2 97%   BMI 32.81 kg/m²     Physical Exam  Vitals and nursing note reviewed.   Constitutional:       General: She is not in acute distress.     Appearance: Normal appearance.   HENT:      Head: Normocephalic and atraumatic.      Nose: Nose normal.      Mouth/Throat:      Pharynx: Oropharynx is clear.   Eyes:      General: No scleral icterus.     Conjunctiva/sclera: Conjunctivae normal.   Cardiovascular:      Rate and Rhythm: Normal rate and regular rhythm.      Heart sounds: Normal heart sounds. No murmur heard.      Pulmonary:      Effort: No respiratory distress.      Breath sounds: Normal breath sounds. No wheezing, rhonchi or rales.   Chest:      Chest wall: No tenderness.   Abdominal:      Palpations: Abdomen is soft.      Tenderness: There is no abdominal tenderness. There is no guarding or rebound.      Comments: No rigidity.   Musculoskeletal:         General: No tenderness. Normal range of motion.      Cervical back: Normal range of motion and neck supple.      Right lower leg: No edema.      Left lower leg: No edema.   Skin:     General: Skin is warm and dry.   Neurological:      Mental Status: She is alert. Mental status is at baseline.   Psychiatric:         Mood and Affect: Mood normal.         Behavior: Behavior normal.                Medications in the Emergency Department:  Medications "   sodium chloride 0.9 % flush 10 mL (has no administration in time range)   ondansetron (ZOFRAN) injection 4 mg (4 mg Intravenous Given 2/22/22 1203)   famotidine (PEPCID) injection 20 mg (20 mg Intravenous Given 2/22/22 1203)   sodium chloride 0.9 % bolus 1,000 mL (1,000 mL Intravenous New Bag 2/22/22 1203)        Labs  Lab Results (last 24 hours)     Procedure Component Value Units Date/Time    CBC & Differential [[id number]]  (Abnormal) Collected: 02/22/22 1042    Specimen: Blood Updated: 02/22/22 1050    Narrative:      The following orders were created for panel order CBC & Differential.  Procedure                               Abnormality         Status                     ---------                               -----------         ------                     CBC Auto Differential[[id number]]        Abnormal            Final result                 Please view results for these tests on the individual orders.    Comprehensive Metabolic Panel [[id number]]  (Abnormal) Collected: 02/22/22 1042    Specimen: Blood Updated: 02/22/22 1105     Glucose 157 mg/dL      BUN 24 mg/dL      Creatinine 1.05 mg/dL      Sodium 129 mmol/L      Potassium 4.6 mmol/L      Chloride 95 mmol/L      CO2 20.6 mmol/L      Calcium 9.0 mg/dL      Total Protein 7.6 g/dL      Albumin 3.90 g/dL      ALT (SGPT) 20 U/L      AST (SGOT) 29 U/L      Alkaline Phosphatase 92 U/L      Total Bilirubin 0.5 mg/dL      eGFR Non African Amer 53 mL/min/1.73      Globulin 3.7 gm/dL      A/G Ratio 1.1 g/dL      BUN/Creatinine Ratio 22.9     Anion Gap 13.4 mmol/L     Narrative:      GFR Normal >60  Chronic Kidney Disease <60  Kidney Failure <15      Lipase [[id number]]  (Normal) Collected: 02/22/22 1042    Specimen: Blood Updated: 02/22/22 1105     Lipase 31 U/L     CBC Auto Differential [[id number]]  (Abnormal) Collected: 02/22/22 1042    Specimen: Blood Updated: 02/22/22 1050     WBC 6.35 10*3/mm3      RBC 4.87 10*6/mm3      Hemoglobin 15.0 g/dL      Hematocrit  43.2 %      MCV 88.7 fL      MCH 30.8 pg      MCHC 34.7 g/dL      RDW 12.9 %      RDW-SD 42.2 fl      MPV 9.6 fL      Platelets 206 10*3/mm3      Neutrophil % 59.0 %      Lymphocyte % 27.6 %      Monocyte % 12.4 %      Eosinophil % 0.2 %      Basophil % 0.3 %      Immature Grans % 0.5 %      Neutrophils, Absolute 3.75 10*3/mm3      Lymphocytes, Absolute 1.75 10*3/mm3      Monocytes, Absolute 0.79 10*3/mm3      Eosinophils, Absolute 0.01 10*3/mm3      Basophils, Absolute 0.02 10*3/mm3      Immature Grans, Absolute 0.03 10*3/mm3      nRBC 0.0 /100 WBC     Rapid Strep A Screen - Swab, Throat [[id number]]  (Normal) Collected: 02/22/22 1130    Specimen: Swab from Throat Updated: 02/22/22 1202     Strep A Ag Negative    COVID-19,APTIMA PANTHER(FLORENCIO),[facility], NP/OP SWAB IN UTM/VTM/[facility] TRANSPORT MEDIA,24 HR TAT - Swab, Nasal Cavity [[id number]] Collected: 02/22/22 1130    Specimen: Swab from Nasal Cavity Updated: 02/22/22 1134    Beta Strep Culture, Throat - Swab, Throat [[id number]] Collected: 02/22/22 1130    Specimen: Swab from Throat Updated: 02/22/22 1201    Urinalysis With Microscopic If Indicated (No Culture) - Urine, Clean Catch [[id number]]  (Abnormal) Collected: 02/22/22 1301    Specimen: Urine, Clean Catch Updated: 02/22/22 1317     Color, UA Yellow     Appearance, UA Clear     pH, UA 5.5     Specific Gravity, UA 1.009     Glucose, UA Negative     Ketones, UA Trace     Bilirubin, UA Negative     Blood, UA Negative     Protein, UA Negative     Leuk Esterase, UA Negative     Nitrite, UA Negative     Urobilinogen, UA 0.2 E.U./dL    Narrative:      Urine microscopic not indicated.           Imaging:  No Radiology Exams Resulted Within Past 24 Hours    Procedures:  Procedures    Progress                            Medical Decision Making:  MDM  Number of Diagnoses or Management Options  Vomiting without nausea, intractability of vomiting not specified, unspecified vomiting type  Diagnosis management comments: The  patient´s CBC was reviewed and shows no abnormalities of critical concern. Of note, there is no anemia requiring a blood transfusion and the platelet count is acceptable.  The patient´s CMP was reviewed and shows no abnormalities of critical concern. Of note, the patient´s sodium and potassium are acceptable. The patient´s liver enzymes are unremarkable. The patient´s renal function (creatinine) is preserved. The patient has a normal anion gap.  Urinalysis negative for bacteriuria.  The patient comes to the ED for evaluation of vomiting. Emesis is much improved in the ED. The patient was given antiemetics in the ED. The patient is resting comfortably and feels better, is alert and in no distress. Repeat examination is unremarkable and benign; in particular, there's no discomfort at McBurney's point. The history, exam, diagnostic testing, and current condition does not suggest acute appendicitis, bowel instruction, acute cholecystitis, bowel perforation, major gastrointestinal bleeding, severe diverticulitis, abdominal aortic aneurysm, mesenteric ischemia, volvulus, sepsis, or other significant pathology that warrants further testing, continued ED treatment, admission, for surgical evaluation at this point. The vital signs have been stable. Bloodwork performed shows no signs of acute renal failure. The patient does not have uncontrollable pain, intractable vomiting, or other significant symptoms. The patient is now able to tolerate po intake in the ED and has passed a po challenge. The patient's condition is stable and appropriate for discharge from the emergency department.       Amount and/or Complexity of Data Reviewed  Clinical lab tests: reviewed  Decide to obtain previous medical records or to obtain history from someone other than the patient: yes  Independent visualization of images, tracings, or specimens: yes    Risk of Complications, Morbidity, and/or Mortality  Presenting problems: moderate  Management  options: moderate    Patient Progress  Patient progress: stable       Final diagnoses:   Vomiting without nausea, intractability of vomiting not specified, unspecified vomiting type        Disposition:  ED Disposition     ED Disposition Condition Comment    Discharge Stable           This medical record created using voice recognition software and may contain unintended errors.             Maria Eugenia Santos  02/22/22 1125       Alma Reese MD  02/22/22 2992

## 2022-03-07 NOTE — PROGRESS NOTES
"Chief Complaint  Med Refill and Foot Pain (Was seen in October from urgent care)    Subjective          Karen Goldstein presents to [facility]  Hypertension: normal, no chest pain, leg swelling or blurred vision    HLD: reviewed last labs    DM2: has lost weight since having COVID, she has been trying to eat better, sugars have been under better control.  No hypoglycemic episodes.    Left foot pain: chronic x 6 months.  Still having swelling on the inside of her foot.  She has been evaluated by urgent care and has had xray which was reviewed with patient during visit.     Smokinppd, 40 years.  Interested in quitting.  She has had a friend quit by using Chantix and patient would like to try this.  She has not had a low dose CT chest but would be interested.       Objective   Vital Signs:   /72 (BP Location: Left arm, Patient Position: Sitting, Cuff Size: Adult)   Pulse 80   Temp 97.9 °F (36.6 °C) (Temporal)   Ht 167.6 cm (66\")   Wt 92.2 kg (203 lb 3.2 oz)   SpO2 97%   BMI 32.80 kg/m²     Physical Exam  Vitals reviewed.   Constitutional:       Appearance: Normal appearance. She is well-developed.   HENT:      Head: Normocephalic and atraumatic.   Eyes:      Conjunctiva/sclera: Conjunctivae normal.      Pupils: Pupils are equal, round, and reactive to light.   Cardiovascular:      Rate and Rhythm: Normal rate and regular rhythm.      Heart sounds: No murmur heard.    No friction rub. No gallop.   Pulmonary:      Effort: Pulmonary effort is normal.      Breath sounds: Normal breath sounds. No wheezing or rhonchi.   Abdominal:      General: Bowel sounds are normal. There is no distension.      Palpations: Abdomen is soft.      Tenderness: There is no abdominal tenderness.   Musculoskeletal:      Comments: Swelling of left medial foot   Skin:     General: Skin is warm and dry.   Neurological:      Mental Status: She is alert and oriented to person, place, and " time.      Cranial Nerves: No cranial nerve deficit.   Psychiatric:         Mood and Affect: Mood and affect normal.         Behavior: Behavior normal.         Thought Content: Thought content normal.         Judgment: Judgment normal.        Result Review :          Procedures      Assessment and Plan    Diagnoses and all orders for this visit:    1. Type 2 diabetes mellitus without complication, without long-term current use of insulin (HCC) (Primary)  Assessment & Plan:  Better controlled per patient.  Will check A1c today.  Continue to work on diet and exercise.    Orders:  -     CBC w AUTO Differential  -     Comprehensive metabolic panel  -     Hemoglobin A1c  -     metFORMIN (GLUCOPHAGE) 500 MG tablet; Take 1 tablet by mouth Daily With Breakfast.  Dispense: 30 tablet; Refill: 5    2. Hyperlipidemia, unspecified hyperlipidemia type  Assessment & Plan:  Will check labs today and treat depending on results.    Orders:  -     Lipid panel    3. Left foot pain  Assessment & Plan:  Will order MRI of foot due to chronic swelling and pain with normal x-ray.  Could consider podiatry referral depending on MRI results.    Orders:  -     MRI Foot Left Without Contrast; Future  -     cyclobenzaprine (FLEXERIL) 10 MG tablet; Take 1 tablet by mouth 3 (Three) Times a Day As Needed for Muscle Spasms.  Dispense: 90 tablet; Refill: 0    4. Tobacco abuse  Assessment & Plan:  Patient interested in quitting, will start Chantix at this time.  Discussed risk of intrusive thoughts or other side effects when starting medication.  Would like for patient to decrease cigarettes.  Will order low-dose CT of chest for lung cancer screening.    Orders:  -     varenicline (CHANTIX RENAE) 0.5 MG X 11 & 1 MG X 42 tablet; Take 0.5 mg po daily x 3 days, then 0.5 mg po bid x 4 days, then 1 mg po bid  Dispense: 53 tablet; Refill: 0  -     varenicline (Chantix Continuing Month Renae) 1 MG tablet; Take 1 tablet by mouth 2 (Two) Times a Day for 56 days.   Dispense: 56 tablet; Refill: 1  -      CT Chest Low Dose Cancer Screening WO; Future    5. Nicotine dependence, cigarettes, uncomplicated   -      CT Chest Low Dose Cancer Screening WO; Future    6. Essential hypertension  Assessment & Plan:  Hypertension is unchanged.  Continue current treatment regimen.  Blood pressure will be reassessed in 3 months.    Orders:  -     amLODIPine (NORVASC) 10 MG tablet; Take 1 tablet by mouth Daily.  Dispense: 30 tablet; Refill: 5  -     atenolol (TENORMIN) 25 MG tablet; Take 1 tablet by mouth Daily.  Dispense: 30 tablet; Refill: 5  -     lisinopril (PRINIVIL,ZESTRIL) 40 MG tablet; Take 1 tablet by mouth Daily.  Dispense: 30 tablet; Refill: 5            Follow Up   Return in about 3 months (around 6/7/2022) for Dr. Gibson.  Patient was given instructions and counseling regarding her condition or for health maintenance advice. Please see specific information pulled into the AVS if appropriate.

## 2022-03-08 PROBLEM — F17.210 NICOTINE DEPENDENCE, CIGARETTES, UNCOMPLICATED: Status: ACTIVE | Noted: 2022-01-01

## 2022-03-08 PROBLEM — Z72.0 TOBACCO ABUSE: Status: ACTIVE | Noted: 2022-01-01

## 2022-03-08 PROBLEM — M79.672 LEFT FOOT PAIN: Status: ACTIVE | Noted: 2022-01-01

## 2022-04-08 NOTE — TELEPHONE ENCOUNTER
Red rule verified and correct.    Pt calling for results of her MRI; advised a provider has not reviewed it yet and will call with results after that occurs.    Patient verbalized understanding.      (Ortho referral pended)

## 2022-04-11 NOTE — TELEPHONE ENCOUNTER
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  There is a tendon attached to this area which could lead to inflammation due to pulling.  Would recommend patient wear a walking boot to help rest this area. Can also send to podiatry for further evaluation if still having problems.

## 2022-04-11 NOTE — TELEPHONE ENCOUNTER
Called patient and informed of results. Patient voiced understanding, but also wants to get a boot and see podiatry. Patient will come by the office today to  a boot and a referral has been placed. Patient also wanted to let us know that she will be leaving out of town April 26th and will be back May 2nd.

## 2022-04-20 NOTE — ASSESSMENT & PLAN NOTE
Will order MRI of foot due to chronic swelling and pain with normal x-ray.  Could consider podiatry referral depending on MRI results.   Hydroxychloroquine Counseling:  I discussed with the patient that a baseline ophthalmologic exam is needed at the start of therapy and every year thereafter while on therapy. A CBC may also be warranted for monitoring.  The side effects of this medication were discussed with the patient, including but not limited to agranulocytosis, aplastic anemia, seizures, rashes, retinopathy, and liver toxicity. Patient instructed to call the office should any adverse effect occur.  The patient verbalized understanding of the proper use and possible adverse effects of Plaquenil.  All the patient's questions and concerns were addressed.

## 2022-05-13 NOTE — TELEPHONE ENCOUNTER
[facility] WAS UNABLE TO WARM TRANSFER    Caller: Karen Goldstein    Relationship: Self    Best call back number: [phone]    What is the best time to reach you: ANYTIME     Who are you requesting to speak with (clinical staff, provider,  specific staff member): CLINICAL       What was the call regarding: PATIENT IS CALLING WITH COVID RELATED CLINICAL QUESTIONS     Do you require a callback: YES

## 2022-05-16 NOTE — TELEPHONE ENCOUNTER
"Red rule verified and correct.    Pt asking if it is common for hair to fall out after having covid.    Has come out in \"hand fulls\" x 4 weeks.    Thinning, no bald spots.  "

## 2022-05-18 NOTE — TELEPHONE ENCOUNTER
Hair loss can occur after any event that is stressful on the body such as illness, child birth, extreme emotional upset, etc. COVID can be such an inciting event. Hair loss caused by this tends to be thinning rather than patchy and usually lasts from a few weeks to a few months and then slowly regrows.

## 2022-05-23 NOTE — TELEPHONE ENCOUNTER
Caller: Karen Goldstein    Relationship: Self    Best call back number: [phone]    What is the best time to reach you: ANYTIME     Who are you requesting to speak with (clinical staff, provider,  specific staff member): CLINICAL      What was the call regarding:     THE PATIENT SAID HER HAIR IS FALLING OUT DUE TO HER HAVING COVID AND BEING UNDER STRESS. SHE SAID HALF OF HER HAIR IS GONE.  SHE SAID IT HAS BEEN 6 WEEKS AND IS WANTING TO KNOW WHAT SHE TO DO. SHE IS WANTING SOME SUGGESTIONS ON HOW TO STOP IT.     SHE IS REQUESTING A CALL BACK AND LEAVE A DETAIL  VOICEMAIL IF SHE DON'T ANSWER.         Do you require a callback:  YES

## 2022-05-25 NOTE — TELEPHONE ENCOUNTER
Called patient and informed of message per provider.   Patient verbalized Understanding, had no follow up questions or concerns at this time.

## 2022-05-25 NOTE — TELEPHONE ENCOUNTER
Please see my previous response for explanation of this symptom. For hair loss following a stressful event, there is really no way to prevent it. It is a self limited process that resolves with time. If she would like to be seen in clinic for assessment of her hair loss, please schedule her an appointment.

## 2022-05-25 NOTE — PROGRESS NOTES
[facility] - PODIATRY    Today's Date: 05/25/22    Patient Name: Karen Goldstein  MRN: [id number]  CSN: [id number]  PCP: Lakshmi Gibson MD  Referring Provider: Annmarie Hernandez PA-C    SUBJECTIVE     Chief Complaint   Patient presents with   • Left Foot - Pain, Establish Care     Per pt she was given cam walker after the MRI and she wore it for a few weeks.     HPI: Karen Goldstein, a 62 y.o.female, presents to clinic.    New, Established, New Problem: New    Location: Medial aspect of the left midfoot    Duration: Fall 2021    Onset: Insidious    Nature: Sore, painful    Stable, worsening, improving: Stable, no improvement    Aggravating factors:  Patient relates pain is aggravated by shoe gear and ambulation.      Previous Treatment: X-ray, MRI, Cam walker.  She states she stopped wearing the cam walker.  Patient states she uses a walking stick due to chronic sciatica.    Patient denies any fevers, chills, nausea, vomiting, shortness of breath, nor any other constitutional signs nor symptoms.    No other pedal complaints at this time.    Past Medical History:   Diagnosis Date   • Arthritis    • Cervicalgia 03/07/2017   • Diabetes mellitus (HCC)    • DM2 (diabetes mellitus, type 2) ([facility])    • Essential hypertension    • Foot pain, left    • Hyperlipidemia    • Hypertension    • Hypertension    • Lumbago 03/07/2017    low back pain   • Paresthesia 03/07/2017   • Sciatica 03/07/2017     Past Surgical History:   Procedure Laterality Date   • BACK SURGERY     • OTHER SURGICAL HISTORY      discectomy   • TONSILLECTOMY       Family History   Problem Relation Age of Onset   • Stroke Mother    • Other Father         spine problems   • Arthritis Father    • Other Sister         spine problems   • Arthritis Sister    • Cancer Neg Hx    • Diabetes Neg Hx    • Heart disease Neg Hx      Social History     Socioeconomic History   • Marital status: Single   Tobacco Use   • Smoking status: Current Every Day  Smoker     Packs/day: 1.00     Years: 20.00     Pack years: 20.00     Types: Cigarettes   • Smokeless tobacco: Never Used   • Tobacco comment: smoked 11-20 years   Vaping Use   • Vaping Use: Never used   Substance and Sexual Activity   • Alcohol use: Yes     Comment: rare; less than 1 drink per day; been drinking 21-20 years    • Drug use: Never   • Sexual activity: Defer     Allergies   Allergen Reactions   • Codeine Nausea And Vomiting   • Ibuprofen GI Intolerance     Current Outpatient Medications   Medication Sig Dispense Refill   • amLODIPine (NORVASC) 10 MG tablet Take 1 tablet by mouth Daily. 30 tablet 5   • Apple Cider Vinegar 188 MG capsule      • ascorbic acid (VITAMIN C) 1000 MG tablet Vitamin C 1,000 mg oral tablet take 1 tablet by oral route 2 times a day   Active     • atenolol (TENORMIN) 25 MG tablet Take 1 tablet by mouth Daily. 30 tablet 5   • B Complex Vitamins (B COMPLEX-B12 PO) Vitamins B Complex oral capsule take 1 capsule by oral route daily   Active     • Biotin 10 MG capsule biotin 10,000 mcg oral capsule take 1 capsule by oral route 2 times a day   Active     • Cholecalciferol 125 MCG (5000 UT) tablet Vitamin D3 125 mcg (5,000 unit) oral tablet take 1 tablet by oral route 2 times a day   Active     • Coenzyme Q10 200 MG capsule Co Q-10 200 mg oral capsule take 1 capsule by oral route daily   Active     • cyclobenzaprine (FLEXERIL) 10 MG tablet Take 1 tablet by mouth 3 (Three) Times a Day As Needed for Muscle Spasms. 90 tablet 0   • lisinopril (PRINIVIL,ZESTRIL) 40 MG tablet Take 1 tablet by mouth Daily. 30 tablet 5   • metFORMIN (GLUCOPHAGE) 500 MG tablet Take 1 tablet by mouth Daily With Breakfast. 30 tablet 5   • Zinc 100 MG tablet zinc 50 mg oral tablet take 1 tablet by oral route daily   Active     • diclofenac (VOLTAREN) 75 MG EC tablet Take 1 tablet by mouth 2 (Two) Times a Day for 30 days. 60 tablet 1     Current Facility-Administered Medications   Medication Dose Route Frequency  Provider Last Rate Last Admin   • methylPREDNISolone acetate (DEPO-medrol) injection 80 mg  80 mg Intramuscular Once Lakshmi Gibson MD         Review of Systems   Constitutional: Negative.    Musculoskeletal:        Tenderness to medial left midfoot   All other systems reviewed and are negative.      OBJECTIVE     Vitals:    05/25/22 1410   BP: 115/67   Pulse: 82   Temp: 97.3 °F (36.3 °C)   SpO2: 99%       WBC   Date Value Ref Range Status   03/07/2022 11.36 (H) 3.40 - 10.80 10*3/mm3 Final     RBC   Date Value Ref Range Status   03/07/2022 4.53 3.77 - 5.28 10*6/mm3 Final     Hemoglobin   Date Value Ref Range Status   03/07/2022 13.7 12.0 - 15.9 g/dL Final     Hematocrit   Date Value Ref Range Status   03/07/2022 41.1 34.0 - 46.6 % Final     MCV   Date Value Ref Range Status   03/07/2022 90.7 79.0 - 97.0 fL Final     MCH   Date Value Ref Range Status   03/07/2022 30.2 26.6 - 33.0 pg Final     MCHC   Date Value Ref Range Status   03/07/2022 33.3 31.5 - 35.7 g/dL Final     RDW   Date Value Ref Range Status   03/07/2022 12.3 12.3 - 15.4 % Final     RDW-SD   Date Value Ref Range Status   03/07/2022 40.1 37.0 - 54.0 fl Final     MPV   Date Value Ref Range Status   03/07/2022 10.1 6.0 - 12.0 fL Final     Platelets   Date Value Ref Range Status   03/07/2022 404 140 - 450 10*3/mm3 Final     Neutrophil %   Date Value Ref Range Status   03/07/2022 62.9 42.7 - 76.0 % Final     Lymphocyte %   Date Value Ref Range Status   03/07/2022 26.9 19.6 - 45.3 % Final     Monocyte %   Date Value Ref Range Status   03/07/2022 6.9 5.0 - 12.0 % Final     Eosinophil %   Date Value Ref Range Status   03/07/2022 2.1 0.3 - 6.2 % Final     Basophil %   Date Value Ref Range Status   03/07/2022 0.4 0.0 - 1.5 % Final     Immature Grans %   Date Value Ref Range Status   03/07/2022 0.8 (H) 0.0 - 0.5 % Final     Neutrophils, Absolute   Date Value Ref Range Status   03/07/2022 7.14 (H) 1.70 - 7.00 10*3/mm3 Final     Lymphocytes, Absolute    Date Value Ref Range Status   03/07/2022 3.06 0.70 - 3.10 10*3/mm3 Final     Monocytes, Absolute   Date Value Ref Range Status   03/07/2022 0.78 0.10 - 0.90 10*3/mm3 Final     Eosinophils, Absolute   Date Value Ref Range Status   03/07/2022 0.24 0.00 - 0.40 10*3/mm3 Final     Basophils, Absolute   Date Value Ref Range Status   03/07/2022 0.05 0.00 - 0.20 10*3/mm3 Final     Immature Grans, Absolute   Date Value Ref Range Status   03/07/2022 0.09 (H) 0.00 - 0.05 10*3/mm3 Final     nRBC   Date Value Ref Range Status   03/07/2022 0.0 0.0 - 0.2 /100 WBC Final         Lab Results   Component Value Date    GLUCOSE 114 (H) 03/07/2022    BUN 16 03/07/2022    CREATININE 0.76 03/07/2022    EGFRIFNONA 53 (L) 02/22/2022    BCR 21.1 03/07/2022    K 5.2 03/07/2022    CO2 19.4 (L) 03/07/2022    CALCIUM 10.0 03/07/2022    ALBUMIN 4.00 03/07/2022    LABIL2 1.5 01/15/2021    AST 35 (H) 03/07/2022    ALT 23 03/07/2022       Patient seen in no apparent distress.      PHYSICAL EXAM:     Foot/Ankle Exam:       General:   Appearance comment:  Chronically ill  Orientation: AAOx3    Affect: appropriate    Gait: unimpaired    Assistance comment:  Walking stick  Shoe Gear:  Casual shoes    VASCULAR      Right Foot Vascularity   Normal vascular exam    Dorsalis pedis:  1+  Posterior tibial:  1+  Skin Temperature: warm    Edema Grading:  None  CFT:  < 3 seconds  Pedal Hair Growth:  Absent  Varicosities: mild varicosities       Left Foot Vascularity   Normal vascular exam    Dorsalis pedis:  1+  Posterior tibial:  1+  Skin Temperature: warm    Edema Grading:  None  CFT:  < 3 seconds  Pedal Hair Growth:  Absent  Varicosities: mild varicosities        NEUROLOGIC     Right Foot Neurologic   Normal sensation    Light touch sensation:  Normal  Vibratory sensation:  Normal  Hot/Cold sensation: normal    Protective Sensation using [place]-Arlene Monofilament:  10     Left Foot Neurologic   Normal sensation    Light touch sensation:   Normal  Vibratory sensation:  Normal  Hot/cold sensation: normal    Protective Sensation using [place]-Arlene Monofilament:  10     MUSCULOSKELETAL      Left Foot Musculoskeletal   Ecchymosis:  None  Tenderness: posterior tibial tendon    Tenderness comment:  Medial aspect of navicular.     MUSCLE STRENGTH     Right Foot Muscle Strength   Foot dorsiflexion:  4  Foot plantar flexion:  4  Foot inversion:  4  Foot eversion:  4     Left Foot Muscle Strength   Foot dorsiflexion:  4  Foot plantar flexion:  4  Foot inversion:  4  Foot eversion:  4     RANGE OF MOTION      Right Foot Range of Motion   Foot and ankle ROM within normal limits       Left Foot Range of Motion   Foot and ankle ROM within normal limits       DERMATOLOGIC     Right Foot Dermatologic   Skin: skin intact    Nails: normal       Left Foot Dermatologic   Skin: skin intact    Nails: normal        RADIOLOGY:      PROCEDURE:  XR FOOT 3+ VW LEFT     COMPARISON: None     INDICATIONS:  Swelling and tenderness over dorsum of midfoot     FINDINGS:        There is no evidence for displaced fracture or dislocation. No focal osseous   abnormalities are seen.  There is soft tissue swelling at the dorsal aspect of the midfoot.  An os   peroneus is noted.     CONCLUSION:   1. No evidence for displaced fracture or dislocation.  2. Nonspecific soft tissue swelling is noted overlying the dorsal aspect of the midfoot.            OLVIN COLLAZO MD         Electronically Signed and Approved By: OLVIN COLLAZO MD on 11/08/2021 at 14:26         PROCEDURE:  MRI FOOT LEFT WO CONTRAST     COMPARISON:  None  INDICATIONS:  PAIN IN LEFT NAVICULAR BONE WITH OFF/ON SWELLING OF DORSAL ASPECT OF LEFT FOOT X 7   MONTHS, NO KNOWN INJURY                         TECHNIQUE:    A complete multi-planar examination was performed without contrast.     FINDINGS:          An accessory os navicularis is noted.  There is edema at the medial aspect of the navicular bone   and within the  accessory ossification center.  Edema is also noted at the syndesmosis.  The distal   posterior tibialis tendon attachment is seen adjacent to the accessory ossification center.  There   are signs of tendinopathy involving the distal tendon.  There is also mild edema in the adjacent   soft tissues.  These findings indicate changes of accessory os navicularis syndrome related to   irritation or changes of chronic stress associated with the distal posterior tibialis tendon   insertion due to the presence of the ossification center.     No additional abnormal bone marrow signal is seen.  The cortical margins are otherwise grossly   intact.  Mild changes of osteoarthritis are noted.     The flexor and extensor tendons are otherwise grossly intact.  The support ligaments of the midfoot   remain intact including the Lisfranc ligaments.     IMPRESSION:                 1. 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            OLVIN COLLAZO MD         Electronically Signed and Approved By: OLVIN COLLAZO MD on 4/06/2022 at 9:08         ASSESSMENT/PLAN     Diagnoses and all orders for this visit:    1. Foot pain, left (Primary)    2. Accessory bone of foot    3. Difficulty walking    4. Os naviculare pedis malacia (HCC)  -     diclofenac (VOLTAREN) 75 MG EC tablet; Take 1 tablet by mouth 2 (Two) Times a Day for 30 days.  Dispense: 60 tablet; Refill: 1  -     Ambulatory Referral to Physical Therapy Evaluate and treat    5. Posterior tibial tendinitis of left leg    Rice Therapy: It is important to treat any injury as soon as possible to help control swelling and increase recovery time. The recognized regimen for immediate treatment of sport injuries includes rest, ice (cold application), compression, and elevation (RICE). Remove the injured athlete from play, apply ice to the affected area, wrap or compress the injured area with an elastic bandage when appropriate, and elevate the injured area above heart level to reduce  swelling.  The patient is to not use ice for longer than 20 minutes at a time, with at least 20 minutes of no ice usage between applications.  The patient states understanding and agreement with this plan.    Comprehensive lower extremity examination and evaluation was performed.    Discussed findings and treatment plan including risks, benefits, and treatment options with patient in detail. Patient agreed with treatment plan.    Medications and allergies reviewed.  Reviewed available lab values along with other pertinent labs.  These were discussed with the patient.    An After Visit Summary was printed and given to the patient at discharge, including (if requested) any available informative/educational handouts regarding diagnosis, treatment, or medications. All questions were answered to patient/family satisfaction. Should symptoms fail to improve or worsen they agree to call or return to clinic or to go to the Emergency Department. Discussed the importance of following up with any needed screening tests/labs/specialist appointments and any requested follow-up recommended by me today. Importance of maintaining follow-up discussed and patient accepts that missed appointments can delay diagnosis and potentially lead to worsening of conditions.    Return in about 3 weeks (around 6/15/2022) for Physical Therapy f/u., or sooner if acute issues arise.    This document has been electronically signed by Jorge Luis Gonzalez DPM on May 25, 2022 14:49 EDT

## 2022-06-08 PROBLEM — L65.0 TELOGEN EFFLUVIUM: Status: ACTIVE | Noted: 2022-01-01

## 2022-06-08 NOTE — PROGRESS NOTES
"Chief Complaint  Diabetes (3 month follow up) and Hyperlipidemia (3 month follow up)    Subjective          Karen Goldstein presents to [facility]  History of Present Illness  Presenting for follow-up of diabetes and hyperlipidemia.  She is due for follow-up labs today.  She has declined statin therapy.    Still having hair loss post COVID.  Hair loss is thinning, no areas of patchy hair loss.    Cut back on smoking. Could not tolerate Chantix. Had nausea and vomiting. Felt like she was taking a narcotic with mental slowing, limb heaviness, itching, etc.      Objective   Vital Signs:   /66 (BP Location: Left arm, Patient Position: Sitting, Cuff Size: Adult)   Pulse 76   Temp 97.9 °F (36.6 °C) (Oral)   Resp 12   Ht 168.9 cm (66.5\")   Wt 89.8 kg (198 lb)   SpO2 97%   BMI 31.48 kg/m²     Physical Exam  Vitals reviewed.   Constitutional:       Appearance: Normal appearance. She is well-developed.   HENT:      Head: Normocephalic and atraumatic.      Mouth/Throat:      Pharynx: No oropharyngeal exudate.   Eyes:      Conjunctiva/sclera: Conjunctivae normal.      Pupils: Pupils are equal, round, and reactive to light.   Cardiovascular:      Rate and Rhythm: Normal rate and regular rhythm.      Heart sounds: No murmur heard.    No friction rub. No gallop.   Pulmonary:      Effort: Pulmonary effort is normal.      Breath sounds: Normal breath sounds. No wheezing or rhonchi.   Skin:     General: Skin is warm and dry.   Neurological:      Mental Status: She is alert and oriented to person, place, and time.   Psychiatric:         Mood and Affect: Affect normal.        Result Review :   The following data was reviewed by: Lakshmi Gibson MD on 06/08/2022:  CMP    CMP 9/2/21 2/22/22 3/7/22   Glucose 123 (A) 157 (A) 114 (A)   BUN 20 24 (A) 16   Creatinine 0.91 1.05 (A) 0.76   eGFR Non African Am 62 53 (A)    Sodium 139 129 (A) 139   Potassium 4.5 4.6 5.2 "   Chloride 102 95 (A) 101   Calcium 9.6 9.0 10.0   Albumin 4.50 3.90 4.00   Total Bilirubin 0.4 0.5 0.2   Alkaline Phosphatase 95 92 106   AST (SGOT) 36 (A) 29 35 (A)   ALT (SGPT) 33 20 23   (A) Abnormal value            CBC    CBC 9/2/21 2/22/22 3/7/22   WBC 11.99 (A) 6.35 11.36 (A)   RBC 4.73 4.87 4.53   Hemoglobin 14.9 15.0 13.7   Hematocrit 44.5 43.2 41.1   MCV 94.1 88.7 90.7   MCH 31.5 30.8 30.2   MCHC 33.5 34.7 33.3   RDW 12.1 (A) 12.9 12.3   Platelets 280 206 404   (A) Abnormal value            Lipid Panel    Lipid Panel 9/2/21 3/7/22   Total Cholesterol 224 (A) 229 (A)   Triglycerides 222 (A) 237 (A)   HDL Cholesterol 44 38 (A)   VLDL Cholesterol 40 43 (A)   LDL Cholesterol  140 (A) 148 (A)   LDL/HDL Ratio 3.08 3.78   (A) Abnormal value                A1C Last 3 Results    HGBA1C Last 3 Results 9/2/21 3/7/22   Hemoglobin A1C 7.74 (A) 7.10 (A)   (A) Abnormal value            Microalbumin    Microalbumin 9/2/21   Microalbumin, Urine <1.2              Procedures      Assessment and Plan    Diagnoses and all orders for this visit:    1. Type 2 diabetes mellitus without complication, without long-term current use of insulin (HCC) (Primary)  Assessment & Plan:  Well controlled on previous labs  Checking follow up labs today.  Continue current management    Orders:  -     CBC & Differential  -     Comprehensive Metabolic Panel  -     Hemoglobin A1c  -     Lipid Panel    2. Hyperlipidemia, unspecified hyperlipidemia type  Assessment & Plan:  Checking follow up labs  Discussed need for better control of cholesterol given coexisting DM which puts her at higher risk of CV disease. Patient declines statin therapy.       3. Telogen effluvium  Assessment & Plan:  Triggered by COVID infection  Diagnosis discussed  Discussed expectant management                Follow Up   Return for Medicare Wellness, Keep previously scheduled follow up appointment.  Patient was given instructions and counseling regarding her condition or  for health maintenance advice. Please see specific information pulled into the AVS if appropriate.

## 2022-06-08 NOTE — ASSESSMENT & PLAN NOTE
Checking follow up labs  Discussed need for better control of cholesterol given coexisting DM which puts her at higher risk of CV disease. Patient declines statin therapy.

## 2022-07-06 PROBLEM — R10.11 RIGHT UPPER QUADRANT ABDOMINAL PAIN: Status: ACTIVE | Noted: 2022-01-01

## 2022-07-06 NOTE — ED PROVIDER NOTES
"Time: 4:01 PM EDT  Arrived by: private car  Chief Complaint: Abdominal pain    History provided by: Pt  History is limited by: N/A     History of Present Illness:  Patient is a 62 y.o. year old female that presents to the emergency department with abdominal pain.  Pt reports onset last week with RUQ, dry-heaves, vomiting and diarrhea, with diarrhea resolving on Sunday. Pt states she might be dehydrated. Pt reports feeling weak. Pt denies feeling lightheaded. Pt states last BM was soft today, but has resolved, denies constipation, hematochezia or melena. Pt reports only drinking Ensure. Pt is urinating, notes some odor in urine and change of color  described as \"rust.\". Pt denies dysuria, hematuria, urinary hesitancy or frequency. Pt still has gallbladder. Pt denies h/o alcohol abuse.        History provided by:  Patient   used: No        Similar Symptoms Previously: No  Recently seen: No      Patient Care Team  Primary Care Provider: Lakshmi Gibson MD    Past Medical History:     Allergies   Allergen Reactions   • Chantix [Varenicline] Nausea And Vomiting and Mental Status Change   • Codeine Nausea And Vomiting   • Ibuprofen GI Intolerance     Past Medical History:   Diagnosis Date   • Arthritis    • Cervicalgia 03/07/2017   • Diabetes mellitus (HCC)    • DM2 (diabetes mellitus, type 2) (HCC)    • Essential hypertension    • Foot pain, left    • Hyperlipidemia    • Hypertension    • Hypertension    • Lumbago 03/07/2017    low back pain   • Paresthesia 03/07/2017   • Sciatica 03/07/2017     Past Surgical History:   Procedure Laterality Date   • BACK SURGERY     • OTHER SURGICAL HISTORY      discectomy   • TONSILLECTOMY       Family History   Problem Relation Age of Onset   • Stroke Mother    • Other Father         spine problems   • Arthritis Father    • Other Sister         spine problems   • Arthritis Sister    • Cancer Neg Hx    • Diabetes Neg Hx    • Heart disease Neg Hx  "       Home Medications:  Prior to Admission medications    Medication Sig Start Date End Date Taking? Authorizing Provider   amLODIPine (NORVASC) 10 MG tablet Take 1 tablet by mouth Daily. 3/7/22   Annmarie Hernandez PA-C   Apple Cider Vinegar 188 MG capsule     Emergency, Nurse LAITH Schulz   ascorbic acid (VITAMIN C) 1000 MG tablet Vitamin C 1,000 mg oral tablet take 1 tablet by oral route 2 times a day   Active    Emergency, Nurse Jazz RN   atenolol (TENORMIN) 25 MG tablet Take 1 tablet by mouth Daily. 3/7/22   Annmarie Hernandez PA-C   B Complex Vitamins (B COMPLEX-B12 PO) 2 (Two) Times a Day.    Emergency, Nurse Epic, RN   Biotin 10 MG capsule biotin 10,000 mcg oral capsule take 1 capsule by oral route 2 times a day   Active    Emergency, Nurse Jazz RN   Cholecalciferol 125 MCG (5000 UT) tablet Vitamin D3 125 mcg (5,000 unit) oral tablet take 1 tablet by oral route 2 times a day   Active    Emergency, Nurse Jazz RN   Coenzyme Q10 200 MG capsule Co Q-10 200 mg oral capsule take 1 capsule by oral route daily   Active    Emergency, Nurse Epic, RN   cyclobenzaprine (FLEXERIL) 10 MG tablet Take 1 tablet by mouth 3 (Three) Times a Day As Needed for Muscle Spasms. 3/7/22   Annmarie Hernandez PA-C   lisinopril (PRINIVIL,ZESTRIL) 40 MG tablet Take 1 tablet by mouth Daily. 3/7/22   Annmarie Hernandez PA-C   metFORMIN (GLUCOPHAGE) 500 MG tablet Take 1 tablet by mouth Daily With Breakfast.  Patient taking differently: Take 500 mg by mouth Every Night. 3/7/22   Annmarie Hernandez PA-C   Zinc 100 MG tablet zinc 50 mg oral tablet take 1 tablet by oral route daily   Active    Emergency, Nurse LAITH Schulz        Social History:   Social History     Tobacco Use   • Smoking status: Current Every Day Smoker     Packs/day: 1.00     Years: 20.00     Pack years: 20.00     Types: Cigarettes   • Smokeless tobacco: Never Used   • Tobacco comment: smoked 11-20 years   Vaping Use   • Vaping Use: Never used   Substance Use Topics   • Alcohol use: Yes      "Comment: rare; less than 1 drink per day; been drinking 21-20 years    • Drug use: Never         Review of Systems:  Review of Systems   Constitutional: Negative for chills, diaphoresis and fever.   HENT: Negative for congestion, postnasal drip, rhinorrhea and sore throat.    Eyes: Negative for photophobia.   Respiratory: Negative for cough, chest tightness and shortness of breath.    Cardiovascular: Negative for chest pain, palpitations and leg swelling.   Gastrointestinal: Positive for abdominal pain (RUQ), diarrhea and vomiting. Negative for blood in stool, constipation and nausea.        Dry-heaves   Genitourinary: Negative for difficulty urinating, dysuria, flank pain, frequency, hematuria and urgency.   Musculoskeletal: Negative for neck pain and neck stiffness.   Skin: Negative for pallor and rash.   Neurological: Positive for weakness. Negative for dizziness, syncope, light-headedness, numbness and headaches.   Hematological: Negative for adenopathy. Does not bruise/bleed easily.   Psychiatric/Behavioral: Negative.         Physical Exam:  /89   Pulse 102   Temp 97.9 °F (36.6 °C) (Oral)   Resp 20   Ht 167.6 cm (66\")   Wt 85 kg (187 lb 6.3 oz)   SpO2 94%   BMI 30.25 kg/m²     Physical Exam  Vitals and nursing note reviewed.   Constitutional:       General: She is not in acute distress.     Appearance: Normal appearance. She is not ill-appearing, toxic-appearing or diaphoretic.   HENT:      Head: Normocephalic and atraumatic.      Mouth/Throat:      Mouth: Mucous membranes are moist.   Eyes:      Pupils: Pupils are equal, round, and reactive to light.   Cardiovascular:      Rate and Rhythm: Normal rate and regular rhythm.      Pulses: Normal pulses.           Carotid pulses are 2+ on the right side and 2+ on the left side.       Radial pulses are 2+ on the right side and 2+ on the left side.        Femoral pulses are 2+ on the right side and 2+ on the left side.       Popliteal pulses are 2+ on the " right side and 2+ on the left side.        Dorsalis pedis pulses are 2+ on the right side and 2+ on the left side.        Posterior tibial pulses are 2+ on the right side and 2+ on the left side.      Heart sounds: Normal heart sounds. No murmur heard.     Comments: Good distal pulses.  Pulmonary:      Effort: Pulmonary effort is normal. No accessory muscle usage, respiratory distress or retractions.      Breath sounds: Decreased breath sounds (Slight) present. No wheezing, rhonchi or rales.      Comments: Slight expiratory wheeze.  Abdominal:      General: Abdomen is flat. There is distension.      Palpations: Abdomen is soft. There is hepatomegaly. There is no mass.      Tenderness: There is abdominal tenderness in the right upper quadrant. There is no right CVA tenderness, left CVA tenderness, guarding or rebound. Negative signs include McBurney's sign.      Comments: No rigidity     Musculoskeletal:         General: No swelling, tenderness or deformity.      Cervical back: Neck supple. No tenderness.      Right lower leg: No edema.      Left lower leg: No edema.   Skin:     General: Skin is warm and dry.      Capillary Refill: Capillary refill takes less than 2 seconds.      Coloration: Skin is not jaundiced or pale.      Findings: No erythema.   Neurological:      General: No focal deficit present.      Mental Status: She is alert and oriented to person, place, and time. Mental status is at baseline.      Sensory: No sensory deficit.      Motor: No weakness.   Psychiatric:         Mood and Affect: Mood normal.         Behavior: Behavior normal.                Medications in the Emergency Department:  Medications   sodium chloride 0.9 % flush 10 mL (has no administration in time range)   iopamidol (ISOVUE-370) 76 % injection 100 mL (100 mL Intravenous Given 7/6/22 1905)        Labs  Lab Results (last 24 hours)     Procedure Component Value Units Date/Time    CBC & Differential [[id number]]  (Abnormal) Collected:  07/06/22 1415    Specimen: Blood Updated: 07/06/22 1441    Narrative:      The following orders were created for panel order CBC & Differential.  Procedure                               Abnormality         Status                     ---------                               -----------         ------                     CBC Auto Differential[[id number]]        Abnormal            Final result                 Please view results for these tests on the individual orders.    Comprehensive Metabolic Panel [[id number]]  (Abnormal) Collected: 07/06/22 1415    Specimen: Blood Updated: 07/06/22 1502     Glucose 103 mg/dL      BUN 15 mg/dL      Creatinine 1.07 mg/dL      Sodium 133 mmol/L      Potassium 4.3 mmol/L      Chloride 99 mmol/L      CO2 20.8 mmol/L      Calcium 10.2 mg/dL      Total Protein 7.3 g/dL      Albumin 3.50 g/dL      ALT (SGPT) 121 U/L      AST (SGOT) 189 U/L      Alkaline Phosphatase 385 U/L      Total Bilirubin 2.5 mg/dL      Globulin 3.8 gm/dL      A/G Ratio 0.9 g/dL      BUN/Creatinine Ratio 14.0     Anion Gap 13.2 mmol/L      eGFR 58.1 mL/min/1.73      Comment: National Kidney Foundation and American Society of Nephrology (ASN) Task Force recommended calculation based on the Chronic Kidney Disease Epidemiology Collaboration (CKD-EPI) equation refit without adjustment for race.       Narrative:      GFR Normal >60  Chronic Kidney Disease <60  Kidney Failure <15      Lipase [[id number]]  (Abnormal) Collected: 07/06/22 1415    Specimen: Blood Updated: 07/06/22 1502     Lipase 80 U/L     Lactic Acid, Plasma [[id number]]  (Normal) Collected: 07/06/22 1415    Specimen: Blood Updated: 07/06/22 1500     Lactate 1.9 mmol/L     CBC Auto Differential [[id number]]  (Abnormal) Collected: 07/06/22 1415    Specimen: Blood Updated: 07/06/22 1441     WBC 16.86 10*3/mm3      RBC 5.56 10*6/mm3      Hemoglobin 17.0 g/dL      Hematocrit 50.5 %      MCV 90.8 fL      MCH 30.6 pg      MCHC 33.7 g/dL      RDW 14.7 %      RDW-SD  48.8 fl      MPV 11.0 fL      Platelets 279 10*3/mm3      Neutrophil % 76.0 %      Lymphocyte % 13.6 %      Monocyte % 9.0 %      Eosinophil % 0.6 %      Basophil % 0.3 %      Immature Grans % 0.5 %      Neutrophils, Absolute 12.82 10*3/mm3      Lymphocytes, Absolute 2.29 10*3/mm3      Monocytes, Absolute 1.52 10*3/mm3      Eosinophils, Absolute 0.10 10*3/mm3      Basophils, Absolute 0.05 10*3/mm3      Immature Grans, Absolute 0.08 10*3/mm3      nRBC 0.0 /100 WBC     Urinalysis With Microscopic If Indicated (No Culture) - Urine, Clean Catch [[id number]]  (Abnormal) Collected: 07/06/22 1427    Specimen: Urine, Clean Catch Updated: 07/06/22 1512     Color, UA Dark Yellow     Appearance, UA Clear     pH, UA 5.5     Specific Gravity, UA 1.023     Glucose, UA Negative     Ketones, UA Trace     Bilirubin, UA Moderate (2+)     Blood, UA Negative     Protein, UA Trace     Leuk Esterase, UA Trace     Nitrite, UA Negative     Urobilinogen, UA 1.0 E.U./dL    Urinalysis, Microscopic Only - Urine, Clean Catch [[id number]]  (Abnormal) Collected: 07/06/22 1427    Specimen: Urine, Clean Catch Updated: 07/06/22 1512     RBC, UA None Seen /HPF      WBC, UA 3-5 /HPF      Bacteria, UA None Seen /HPF      Squamous Epithelial Cells, UA 7-12 /HPF      Hyaline Casts, UA None Seen /LPF      Methodology Automated Microscopy           Imaging:  CT Abdomen Pelvis With Contrast    Result Date: 7/6/2022  PROCEDURE: CT ABDOMEN PELVIS W CONTRAST  COMPARISON: None  INDICATIONS: epigastric abdominal pain, nausea, diarrhea  TECHNIQUE: After obtaining the patient's consent, CT images were created with non-ionic intravenous contrast material.   PROTOCOL:   Standard imaging protocol performed    RADIATION:   DLP: 616 mGy*cm   Automated exposure control was utilized to minimize radiation dose. CONTRAST: 100 cc Isovue 370 I.V.  FINDINGS:  7 millimeter nodule left lower lobe.  There may be a small amount of fluid anterior to the liver.  Slightly nodular  contour of the liver.  Innumerable small lesions seen scattered throughout the liver the largest measuring up to about 2 centimeters concerning for metastatic disease.  No pancreatic lesion but there is adenopathy in kady hepatis near the pancreatic head measuring up to about 4.5 centimeters.  No adrenal lesion.  There is scarring involving the kidneys greater on the left.  No obstructive uropathy.  Bladder is unremarkable.  Uterus demonstrates calcifications and fibroids.  Ovaries not well seen.  There is small amount of ascites in the pelvis.  No evidence for appendicitis.  There is slight stranding adjacent to the rectum age indeterminate.  The stomach demonstrates no lesion.  No obstructive uropathy is seen.  There is mild atherosclerosis.  Point spine degenerative changes are seen.         1. Small 7 millimeter left lower lobe lung nodule.  Metastatic lesion is possible. 2. Slightly nodular contour liver could indicate cirrhosis. 3. Innumerable lesions are seen scattered throughout the liver.  The largest measures up to approximately 2 centimeters.  Findings concerning for primary secondary neoplasm.  Biopsy would be useful.  4. There is adenopathy adjacent to the proximal pancreas and in the kady hepatis measuring up to about 4.5 centimeters in long axis most concerning for metastatic adenopathy. 5. No intrinsic pancreatic lesion clearly seen.  Nine scarring is seen involving both kidneys. 6. Fibroids in the uterus. 7. Small amount of ascites in the pelvis indeterminate. 8. Small amount of stranding or fluid adjacent to the distal rectum.  Correlate with exam.  This air is difficult to evaluate due to incomplete distention.     MICHAEL CARLIN MD       Electronically Signed and Approved By: MICHAEL CARLIN MD on 7/06/2022 at 19:36             US Gallbladder    Result Date: 7/6/2022  PROCEDURE: US GALLBLADDER  COMPARISON:  INDICATIONS: pain  TECHNIQUE: A limited ultrasound examination of the right upper  quadrant was performed.   FINDINGS:  3 centimeter hypoechoic structure in the region of the proximal pancreas.  Heterogeneous nodular liver.  Liver measures 21 centimeters in length.  Portal venous flow is normal.  Hepatic veins are patent.  Gallbladder and straits a thick wall.  There is sludge in the gallbladder.  May be a few stones in the gallbladder.  The gallbladder wall measures 5 millimeters.  Negative sonographic Cabrera sign.  The right kidney is within normal limits.  Common duct measures about 5 millimeters.        1. There is mild hepatomegaly. 2. Heterogeneous slightly nodular liver concerning for cirrhosis. 3. 3 centimeter structure is seen near the proximal pancreas.  Lesion or mass is possible.  Recommend follow-up CT. 4. There is sludge and possibly few stones in the gallbladder.  The gallbladder wall measures up to 5 millimeters.  Cholecystitis in the differential.      MICHAEL CARLIN MD       Electronically Signed and Approved By: MICHAEL CARLIN MD on 7/06/2022 at 17:36               Procedures:  Procedures    Progress                            Medical Decision Making:  MDM  Number of Diagnoses or Management Options     Amount and/or Complexity of Data Reviewed  Clinical lab tests: reviewed  Tests in the radiology section of CPT®: reviewed  Tests in the medicine section of CPT®: reviewed  Discuss the patient with other providers: yes (I discussed the case with the gastroenterologist, Dr. Gunn.  She request the patient be admitted to the hospital for MRI with and without contrast of the abdomen and pelvis to include MRCP.  She request the patient be admitted to the hospital she will see the patient in consultation)                 Final diagnoses:   Right upper quadrant abdominal pain   Liver metastasis (HCC)   Elevated liver enzymes   Elevated bilirubin        Disposition:  ED Disposition     ED Disposition   Decision to Admit    Condition   --    Comment   --             Documentation  assistance provided by Lucia Andesron acting as scribe for Bg He DO. Information recorded by the scribe was done at my direction and has been verified and validated by me.          Lucia Anderson  07/06/22 1658       Bg He DO  07/07/22 1017

## 2022-07-07 NOTE — H&P
[facility]   HOSPITALIST HISTORY AND PHYSICAL  Date: 2022   Patient Name: Karen Goldstein  : 1960  MRN: [id number]  Primary Care Physician:  Lakshmi Gibson MD  Date of admission: 2022    Subjective   Subjective     Chief Complaint: Dehydration    HPI:  Karen Goldstein is a 62 y.o. female who presents to the hospital with a 1 week history of nausea vomiting and diarrhea.  Patient states that the symptoms have mostly resolved however she presented to the hospital with feelings of dehydration.  Patient states she has been able to keep very little down over the past week.  In the emergency department the patient was evaluated, she underwent ultrasound, right upper quadrant which was negative for acute cholecystitis.  Patient underwent CT scan which was consistent with multiple metastatic lesions.  Patient had a leukocytosis, no fever, patient had hemoconcentration with a hemoglobin of 17, patient serum sodium was 133.  Gastroenterology was contacted by the emergency room physician who recommended MRI of the abdomen with and without contrast, MRCP to rule out obstruction and admission to the hospitalist service for further management.      Personal History     Past Medical History:  Past Medical History:   Diagnosis Date   • Arthritis    • Cervicalgia 2017   • Diabetes mellitus (HCC)    • DM2 (diabetes mellitus, type 2) (HCC)    • Essential hypertension    • Foot pain, left    • Hyperlipidemia    • Hypertension    • Hypertension    • Lumbago 2017   • Paresthesia 2017   • Sciatica 2017       Past Surgical History:  Past Surgical History:   Procedure Laterality Date   • BACK SURGERY     • OTHER SURGICAL HISTORY      discectomy   • TONSILLECTOMY         Family History:   family history includes Arthritis in her father and sister; Other in her father and sister; Stroke in her mother.    Social History:    reports that she has been smoking cigarettes. She has a 20.00  pack-year smoking history. She has never used smokeless tobacco. She reports current alcohol use. She reports that she does not use drugs.    Home Medications:  Apple Cider Vinegar, B Complex Vitamins, Biotin, Cholecalciferol, Coenzyme Q10, Zinc, amLODIPine, ascorbic acid, atenolol, cyclobenzaprine, lisinopril, and metFORMIN    Allergies:  Allergies   Allergen Reactions   • Chantix [Varenicline] Nausea And Vomiting and Mental Status Change   • Codeine Nausea And Vomiting   • Ibuprofen GI Intolerance       Review of Systems:  14 point review of systems negative other than stated in HPI    Objective   Objective     Vitals:   Temp:  [97.9 °F (36.6 °C)] 97.9 °F (36.6 °C)  Heart Rate:  [] 104  Resp:  [20] 20  BP: (107-135)/(77-93) 129/80    Physical Exam    Constitutional: Awake, alert, no acute distress, obese   Eyes: Pupils equal, sclerae anicteric, no conjunctival injection   HENT: NCAT, mucous membranes moist   Neck: Supple, no thyromegaly, no lymphadenopathy, trachea midline   Respiratory: Clear to auscultation bilaterally, nonlabored respirations    Cardiovascular: RRR, no murmurs, rubs, or gallops   Gastrointestinal: Positive bowel sounds, soft, large liver   Musculoskeletal: No bilateral ankle edema, no clubbing or cyanosis to extremities   Psychiatric: Appropriate affect, cooperative   Neurologic: Oriented x 3, strength symmetric in all extremities, Cranial Nerves grossly intact to confrontation, speech clear   Skin: No rashes     Result Review:  I have personally reviewed the results from the time of this admission to 7/6/2022 20:42 EDT and agree with these findings:  [x]  Laboratory  CMP    CMP 3/7/22 6/8/22 7/6/22   Glucose 114 (A) 127 (A) 103 (A)   BUN 16 17 15   Creatinine 0.76 0.91 1.07 (A)   Sodium 139 137 133 (A)   Potassium 5.2 4.9 4.3   Chloride 101 101 99   Calcium 10.0 10.1 10.2   Albumin 4.00 4.30 3.50   Total Bilirubin 0.2 0.3 2.5 (A)   Alkaline Phosphatase 106 119 (A) 385 (A)   AST (SGOT) 35  (A) 45 (A) 189 (A)   ALT (SGPT) 23 40 (A) 121 (A)   (A) Abnormal value            CBC    CBC 3/7/22 6/8/22 7/6/22   WBC 11.36 (A) 12.48 (A) 16.86 (A)   RBC 4.53 5.02 5.56 (A)   Hemoglobin 13.7 15.8 17.0 (A)   Hematocrit 41.1 45.5 50.5 (A)   MCV 90.7 90.6 90.8   MCH 30.2 31.5 30.6   MCHC 33.3 34.7 33.7   RDW 12.3 12.9 14.7   Platelets 404 276 279   (A) Abnormal value            []  Microbiology  []  Radiology  []  EKG/Telemetry   []  Cardiology/Vascular   []  Pathology  []  Old records  []  Other:      Assessment & Plan   Assessment / Plan     Assessment:   Hepatic masses  Transaminitis  Elevated bilirubin  Dehydration  Leukocytosis  Mild hyponatremia  Hypertension  Diabetes not on insulin  Hyperlipidemia    Plan:  Patient mid to the hospital for further work-up and management of above  Gastroenterology consulted, appreciate their recommendations  CT scan of the abdomen reviewed, consistent with multiple liver lesions, concerning for metastatic disease, patient aware  MRI of the abdomen with and without contrast ordered  CEA, CA 19-9 ordered and pending  Chart lactated Ringer's, 125 cc/h given dehydration  Zofran for nausea  Monitor sodium closely, suspect hypovolemic hyponatremia  Monitor LFTs  Check INR, timing of biopsy per gastroenterology  Start sliding scale insulin  Clinical course will dictate further management    DVT prophylaxis: SCD  GI: Pepcid  Diet: Regular, n.p.o. at midnight  Telemetry: Reviewed, sinus    Reviewed patients labs and imaging, and discussed with patient and nurse at bedside, discussed with Dr. He    CODE STATUS:         Admission Status:  I believe this patient meets inpatient status.      Electronically signed by Ken Leo MD, 07/06/22, 8:42 PM EDT.

## 2022-07-07 NOTE — NURSING NOTE
Spoke to vishnu Everett RN about pending Liver Biopsy for 7/8/2022. Pt is A/O to sign consent, pt does not take any blood thinners, pt is not vaccinated for COVID, labs are up to date. Advised pt needs to be NPO after MN she v/u. Also advised that MD needs to change order to CT Needle Biopsy Liver - she will notify MD. No further questions/concerns at this time.

## 2022-07-07 NOTE — CONSULTS
[facility]  Initial Inpatient Consult Note    Referring Provider: Dr. He    Reason for Consultation: abnormal CT    Subjective     History of present illness:    62 y.o. female with history of DM 2, HTN, and HLD who presented with complaint of dehydration.  Patient reports history of nausea, vomiting, and diarrhea for a week and a half.  She reports she felt dehydrated so presented to the ER.  She reports her nausea, vomiting, and diarrhea has resolved.  She reports a roughly 8 pound weight loss over the last week and a half due to poor oral intake.  Labs on presentation with , , alk phos 385, total bili 2.5.  MRI abdomen with and without contrast with MRCP showed diffuse hepatic metastases estimated at 4 cm or less in size, cirrhosis with regenerative nodules would be in the differential diagnosis but less likely, enlarged lymph nodes involving the upper abdomen, intraosseous metastatic disease may be present, may be gallstones, no biliary ductal dilatation, no choledocholithiasis.  Pt reports no previous h/o colonoscopy but does report negative Cologuard about 1 year ago.    Past Medical History:  Past Medical History:   Diagnosis Date   • Arthritis    • Cervicalgia 03/07/2017   • Diabetes mellitus (HCC)    • DM2 (diabetes mellitus, type 2) (HCC)    • Essential hypertension    • Foot pain, left    • Hyperlipidemia    • Hypertension    • Hypertension    • Lumbago 03/07/2017    low back pain   • Paresthesia 03/07/2017   • Sciatica 03/07/2017     Past Surgical History:  Past Surgical History:   Procedure Laterality Date   • BACK SURGERY     • OTHER SURGICAL HISTORY      discectomy   • TONSILLECTOMY        Social History:   Social History     Tobacco Use   • Smoking status: Current Every Day Smoker     Packs/day: 1.00     Years: 20.00     Pack years: 20.00     Types: Cigarettes   • Smokeless tobacco: Never Used   • Tobacco comment: smoked 11-20 years   Substance Use Topics    • Alcohol use: Yes     Comment: rare; less than 1 drink per day; been drinking 21-20 years       Family History:  Family History   Problem Relation Age of Onset   • Stroke Mother    • Other Father         spine problems   • Arthritis Father    • Other Sister         spine problems   • Arthritis Sister    • Cancer Neg Hx    • Diabetes Neg Hx    • Heart disease Neg Hx        Home Meds:  Facility-Administered Medications Prior to Admission   Medication Dose Route Frequency Provider Last Rate Last Admin   • [DISCONTINUED] methylPREDNISolone acetate (DEPO-medrol) injection 80 mg  80 mg Intramuscular Once Lakshmi Gibson MD         Medications Prior to Admission   Medication Sig Dispense Refill Last Dose   • amLODIPine (NORVASC) 10 MG tablet Take 1 tablet by mouth Daily. 30 tablet 5 Past Week at Unknown time   • Apple Cider Vinegar 188 MG capsule Take 1 capsule by mouth Daily.   Past Week at Unknown time   • ascorbic acid (VITAMIN C) 1000 MG tablet Take 1,000 mg by mouth Daily.   Past Week at Unknown time   • atenolol (TENORMIN) 25 MG tablet Take 1 tablet by mouth Daily. 30 tablet 5 Past Week at Unknown time   • B Complex Vitamins (B COMPLEX-B12 PO) Take 1 tablet by mouth Daily.   Past Week at Unknown time   • Biotin 10 MG capsule Take 10 mg by mouth Daily.   Past Week at Unknown time   • Cholecalciferol 125 MCG (5000 UT) tablet Take 1 tablet by mouth Daily.   Past Week at Unknown time   • Coenzyme Q10 200 MG capsule Take 200 mg by mouth Daily.   Past Week at Unknown time   • lisinopril (PRINIVIL,ZESTRIL) 40 MG tablet Take 1 tablet by mouth Daily. 30 tablet 5 Past Week at Unknown time   • metFORMIN (GLUCOPHAGE) 500 MG tablet Take 1 tablet by mouth Daily With Breakfast. (Patient taking differently: Take 500 mg by mouth Every Night.) 30 tablet 5 Past Week at Unknown time   • Zinc 100 MG tablet Take 1 tablet by mouth Daily.   Past Week at Unknown time   • cyclobenzaprine (FLEXERIL) 10 MG tablet Take 1 tablet by  mouth 3 (Three) Times a Day As Needed for Muscle Spasms. 90 tablet 0 Unknown at Unknown time     Current Meds:   famotidine, 40 mg, Oral, Daily  insulin lispro, 0-7 Units, Subcutaneous, TID AC  multivitamin with minerals, 1 tablet, Oral, Daily  sodium chloride, 10 mL, Intravenous, Q12H      Allergies:  Allergies   Allergen Reactions   • Chantix [Varenicline] Nausea And Vomiting and Mental Status Change   • Codeine Nausea And Vomiting   • Ibuprofen GI Intolerance     Review of Systems  Pertinent items are noted in HPI, all other systems reviewed and negative     Objective     Vital Signs  Temp:  [97.3 °F (36.3 °C)-97.9 °F (36.6 °C)] 97.3 °F (36.3 °C)  Heart Rate:  [] 102  Resp:  [16-20] 16  BP: ()/(72-93) 127/81  Physical Exam:  General Appearance:    Alert, cooperative, in no acute distress   Head:    Normocephalic, without obvious abnormality, atraumatic   Eyes:          conjunctivae and sclerae normal, no icterus   Throat:   no thrush, oral mucosa moist   Neck:   Supple, no adenopathy   Lungs:     Breathing unlabored    Heart:    No chest tenderness    Chest Wall:    No abnormalities observed   Abdomen:     Soft, nondistended, RUQ TTP, hepatomegaly   Extremities:   no edema, no redness   Skin:   No bruising or rash   Psychiatric:  normal mood and insight     Results Review:   I reviewed the patient's new clinical results.    Results from last 7 days   Lab Units 07/07/22  0432 07/06/22  1415   WBC 10*3/mm3 12.95* 16.86*   HEMOGLOBIN g/dL 15.3 17.0*   HEMATOCRIT % 44.9 50.5*   PLATELETS 10*3/mm3 216 279     Results from last 7 days   Lab Units 07/07/22  0432 07/06/22  1415   SODIUM mmol/L 135* 133*   POTASSIUM mmol/L 4.4 4.3   CHLORIDE mmol/L 101 99   CO2 mmol/L 21.4* 20.8*   BUN mg/dL 15 15   CREATININE mg/dL 0.97 1.07*   CALCIUM mg/dL 9.8 10.2   BILIRUBIN mg/dL 2.9* 2.5*   ALK PHOS U/L 325* 385*   ALT (SGPT) U/L 93* 121*   AST (SGOT) U/L 156* 189*   GLUCOSE mg/dL 103* 103*     Results from last 7 days    Lab Units 07/07/22  0138   INR  1.08     Lab Results   Lab Value Date/Time    LIPASE 80 (H) 07/06/2022 1415    LIPASE 31 02/22/2022 1042       Radiology:  MRI abdomen w wo contrast mrcp   Final Result       1. There is an abnormal MRI appearance of the liver, which is thought to be most suggestive of    diffuse hepatic metastases, estimated at 4 cm or less in size.  Cirrhosis with regenerative nodules    would be in the differential diagnosis but is probably less likely.  Consider liver biopsy for    further assessment if clinically warranted and if not contraindicated.     2. There are enlarged lymph nodes involving the upper abdomen, particularly kady hepatis and    portacaval lymph nodes, as discussed.     3. Intraosseous metastatic disease may be present, especially involving the bony pelvis.     4. There may be gallstones.  No definite acute cholecystitis.  Please correlate with pertinent lab    values.     5. No biliary ductal dilatation is suggested.  No choledocholithiasis.  No pancreatic ductal    dilatation is seen.     6. No adrenal mass.     7. There is a 7 mm noncalcified left lower lobe pulmonary nodule.  This finding is indeterminate.     It is better seen on the CT study from earlier during the same day.     8. No acute pancreatitis.  No definite pancreatic mass is suggested.     9. There may be tiny renal cysts.  Renal cortical scarring is seen, greater on the left.  No    hydronephrosis.     10. There is a small amount of ascites.     11. Degenerative changes involve the imaged spine with suspected severe spinal stenosis at L1-2 and    L2-3.     12. Please see above comments for further detail.                     COMMENT:  Part of this note is an electronic transcription of spoken language to printed text. The    electronic translation/transcription may permit erroneous, or at times, nonsensical (or even    sensical) words or phrases to be inadvertently transcribed or omitted; this   has    reviewed the note for such errors (as well as additional errors); however, some may still exist.       MARAH KIDD JR, MD          Electronically Signed and Approved By: MARAH KIDD JR, MD on 7/06/2022 at 23:21                               CT Abdomen Pelvis With Contrast   Final Result       1. Small 7 millimeter left lower lobe lung nodule.  Metastatic lesion is possible.   2. Slightly nodular contour liver could indicate cirrhosis.   3. Innumerable lesions are seen scattered throughout the liver.  The largest measures up to    approximately 2 centimeters.  Findings concerning for primary secondary neoplasm.  Biopsy would be    useful.     4. There is adenopathy adjacent to the proximal pancreas and in the kady hepatis measuring up to    about 4.5 centimeters in long axis most concerning for metastatic adenopathy.   5. No intrinsic pancreatic lesion clearly seen.  Nine scarring is seen involving both kidneys.   6. Fibroids in the uterus.   7. Small amount of ascites in the pelvis indeterminate.   8. Small amount of stranding or fluid adjacent to the distal rectum.  Correlate with exam.  This    air is difficult to evaluate due to incomplete distention.                MICHAEL CARLIN MD          Electronically Signed and Approved By: MICHAEL CARLIN MD on 7/06/2022 at 19:36                           US Gallbladder   Final Result       1. There is mild hepatomegaly.   2. Heterogeneous slightly nodular liver concerning for cirrhosis.   3. 3 centimeter structure is seen near the proximal pancreas.  Lesion or mass is possible.     Recommend follow-up CT.   4. There is sludge and possibly few stones in the gallbladder.  The gallbladder wall measures up to    5 millimeters.  Cholecystitis in the differential.                    MICHAEL CARLIN MD          Electronically Signed and Approved By: MICHAEL CARLIN MD on 7/06/2022 at 17:36                               Assessment & Plan   Patient Active  Problem List   Diagnosis   • DM2 (diabetes mellitus, type 2) (HCC)   • Essential hypertension   • Hyperlipidemia   • Sciatica   • Arthritis   • Tobacco abuse   • Nicotine dependence, cigarettes, uncomplicated    • Left foot pain   • Telogen effluvium   • Right upper quadrant abdominal pain       Assessment:  1. Elevated liver enzymes  2. Abnormal imaging, liver masses    Plan:  · Findings suggestive of metastatic disease to the liver.  · CA 19-9 elevated at 228, CEA elevated at 6.8, AFP pending  · Recommend IR consultation for consideration of liver biopsy to diagnose etiology of liver masses  · Would not recommend ERCP at this time as there is not a dominant stricture noted on imaging.  Elevation in liver enzymes/bilirubin likely secondary to diffuse intrahepatic mets      I discussed the patients findings and my recommendations with patient and consulting provider.    Bushra Gunn MD

## 2022-07-07 NOTE — PLAN OF CARE
Goal Outcome Evaluation:               Patient transported at 1645 to get a CT of the chest. To be NPO at midnight for liver biopsy tomorrow, 7/8 - patient aware. Patient denying any pain/nausea. Blood sugars checked before meals, and insulin ordered if needed. IV fluids infusing. Patient compliant with care and vitals stable.

## 2022-07-07 NOTE — NURSING NOTE
Patient admitted this shift from ER. Patient has been NPO since midnight. Denies need for pain medication, reports mild tenderness in the upper right quadrant. Patient has rested well.

## 2022-07-07 NOTE — PROGRESS NOTES
[facility]   Hospitalist Progress Note  Date: 2022  Patient Name: Karen Goldstein  : 1960  MRN: [id number]  Date of admission: 2022      Subjective   Subjective     Chief Complaint: Weakness, dizziness    Summary: 62 y.o. female who presents to the hospital with a 1 week history of nausea vomiting and diarrhea.  Patient states that the symptoms have mostly resolved however she presented to the hospital with feelings of dehydration.  Patient states she has been able to keep very little down over the past week.  In the emergency department the patient was evaluated, she underwent ultrasound, right upper quadrant which was negative for acute cholecystitis.  Patient underwent CT scan which was consistent with multiple metastatic lesions of the liver.  Gastroenterology was contacted by the emergency room physician who recommended MRI of the abdomen with and without contrast, MRCP to rule out obstruction and admission to the hospitalist service for further management.    Interval Followup: No acute events overnight.  Patient states she is actually feeling significantly better today.  She no longer feels as dehydrated as she previously did.  She is just wanting to eat and drink as she has been n.p.o. and is very hungry.  Otherwise no new complaints.    Review of Systems   All systems reviewed and negative unless otherwise stated under interval follow-up    Objective   Objective     Vitals:   Temp:  [97.3 °F (36.3 °C)-97.7 °F (36.5 °C)] 97.6 °F (36.4 °C)  Heart Rate:  [] 99  Resp:  [16] 16  BP: ()/(72-93) 146/77  Physical Exam    Constitutional: Awake, alert, no acute distress   Eyes: Pupils equal, sclerae anicteric, no conjunctival injection   HENT: NCAT, mucous membranes moist   Neck: Supple, no thyromegaly, no lymphadenopathy, trachea midline   Respiratory: Clear to auscultation bilaterally, nonlabored respirations    Cardiovascular: RRR, no murmurs, rubs, or gallops   Gastrointestinal:  Positive bowel sounds, soft, nontender, nondistended   Musculoskeletal: No bilateral ankle edema, no clubbing or cyanosis to extremities   Psychiatric: Appropriate affect, cooperative   Neurologic: Oriented x 3, strength symmetric in all extremities, Cranial Nerves grossly intact to confrontation, speech clear   Skin: No rashes     Result Review    Result Review:  I have personally reviewed the results from the time of this admission to 7/7/2022 15:28 EDT and agree with these findings:  [x]  Laboratory all labs personally reviewed  []  Microbiology  [x]  Radiology MRCP personally reviewed showing multiple metastatic lesions of the liver and osseous mets  [x]  EKG/Telemetry telemetry personally reviewed showed normal sinus rhythm  []  Cardiology/Vascular   []  Pathology  []  Old records  []  Other:  CBC    CBC 6/8/22 7/6/22 7/7/22   WBC 12.48 (A) 16.86 (A) 12.95 (A)   RBC 5.02 5.56 (A) 4.96   Hemoglobin 15.8 17.0 (A) 15.3   Hematocrit 45.5 50.5 (A) 44.9   MCV 90.6 90.8 90.5   MCH 31.5 30.6 30.8   MCHC 34.7 33.7 34.1   RDW 12.9 14.7 15.0   Platelets 276 279 216   (A) Abnormal value            CMP    CMP 6/8/22 7/6/22 7/7/22   Glucose 127 (A) 103 (A) 103 (A)   BUN 17 15 15   Creatinine 0.91 1.07 (A) 0.97   Sodium 137 133 (A) 135 (A)   Potassium 4.9 4.3 4.4   Chloride 101 99 101   Calcium 10.1 10.2 9.8   Albumin 4.30 3.50 3.10 (A)   Total Bilirubin 0.3 2.5 (A) 2.9 (A)   Alkaline Phosphatase 119 (A) 385 (A) 325 (A)   AST (SGOT) 45 (A) 189 (A) 156 (A)   ALT (SGPT) 40 (A) 121 (A) 93 (A)   (A) Abnormal value              Assessment & Plan   Assessment / Plan     Assessment/Plan:  Metastatic cancer of unknown origin, mets to the liver, bone and possibly lung  Transaminitis  Hyperbilirubinemia  Clinical dehydration  Leukocytosis  Mild hyponatremia  Hypertension  Type 2 diabetes mellitus  Hyperlipidemia    Continue to monitor in the hospital for comanagement the above  Gastroenterology consulted, appreciate assistance  MRCP  reviewed, I discussed the case with interventional radiology, plan to get a tissue diagnosis from the liver lesions  Due to scheduling issues, IR cannot do a biopsy until tomorrow  Will start regular diet, n.p.o. after midnight  CEA, CA 19-9 ordered and pending  Continue LR at 125 cc/h  Will obtain CT chest for further staging and work-up  Patient is otherwise doing well and does want to be discharged after her biopsy is performed.  She wants to complete staging including PET scan on outpatient basis  Will refer to oncology at discharge  Continue appropriate home medications  Trend renal function and electrolytes with a.m. BMP  Trend Hgb and WBC with a.m. CBC    Discussed plan with RN, gastroenterology    DVT prophylaxis:  Mechanical DVT prophylaxis orders are present.    CODE STATUS:   Level Of Support Discussed With: Patient  Code Status (Patient has no pulse and is not breathing): CPR (Attempt to Resuscitate)  Medical Interventions (Patient has pulse or is breathing): Full Support        Electronically signed by Abdoul Smith MD, 07/07/22, 3:28 PM EDT.

## 2022-07-07 NOTE — CASE MANAGEMENT/SOCIAL WORK
Discharge Planning Assessment   [facility]     Patient Name: Karen Goldstein  MRN: [id number]  Today's Date: 7/7/2022    Admit Date: 7/6/2022     Discharge Needs Assessment     Row Name 07/07/22 1500       Living Environment    People in Home significant other    Primary Care Provided by self    Provides Primary Care For no one    Family Caregiver if Needed significant other    Quality of Family Relationships helpful;involved;supportive    Able to Return to Prior Arrangements yes       Resource/Environmental Concerns    Resource/Environmental Concerns none    Transportation Concerns none       Transition Planning    Patient/Family Anticipates Transition to home with family    Patient/Family Anticipated Services at Transition none    Transportation Anticipated family or friend will provide       Discharge Needs Assessment    Readmission Within the Last 30 Days no previous admission in last 30 days    Equipment Currently Used at Home other (see comments)  walking stick    Concerns to be Addressed no discharge needs identified    Anticipated Changes Related to Illness none    Equipment Needed After Discharge none               Discharge Plan     Row Name 07/07/22 1515       Plan    Plan CM assessment completed at bedside with patient.  CM role explained and discharge planning discussed. Face sheet, PCP and Pharmacy verified and updated. Patient states is current with PCP.     Patient is independent and lives with SO; states he is helpful as needed. Patient states this admission diagnosis is the first time she has found out that she might have cancer. Patient states waiting on biopsy result. At this time there are no needs for DME or HHC. CM to follow.              Continued Care and Services - Admitted Since 7/6/2022    Coordination has not been started for this encounter.          Demographic Summary     Row Name 07/07/22 1458       General Information    Admission Type inpatient    Arrived From emergency department     Referral Source admission list    Reason for Consult discharge planning    Preferred Language English       Contact Information    Permission Granted to Share Info With --               Functional Status     Row Name 07/07/22 1458       Functional Status    Usual Activity Tolerance good    Current Activity Tolerance good       Functional Status, IADL    Medications independent    Meal Preparation independent    Housekeeping independent    Laundry independent    Shopping independent       Mental Status    General Appearance WDL WDL       Mental Status Summary    Recent Changes in Mental Status/Cognitive Functioning no changes       Employment/    Employment Status disabled               Psychosocial    No documentation.                Abuse/Neglect    No documentation.                Legal    No documentation.                Substance Abuse    No documentation.                Patient Forms    No documentation.                   Niharika Henley

## 2022-07-08 NOTE — DISCHARGE SUMMARY
[facility]         HOSPITALIST  DISCHARGE SUMMARY    Patient Name: Karen Goldstein  : 1960  MRN: [id number]    Date of Admission: 2022  Date of Discharge: 2022  Primary Care Physician: Lakshmi Gibson MD    Consults     Date and Time Order Name Status Description    2022  8:10 AM Inpatient Pulmonology Consult      2022 11:06 PM Inpatient Gastroenterology Consult Completed     2022  7:53 PM Inpatient Hospitalist Consult      2022  7:45 PM Gastroenterology (on-call MD unless specified) Completed           Active and Resolved Hospital Problems:  Active Hospital Problems    Diagnosis POA   • Right upper quadrant abdominal pain [R10.11] Yes      Resolved Hospital Problems   No resolved problems to display.   Stage IV metastatic lung cancer, unknown subtype, with mets to the liver and bone  Malignant hilar lung mass  Liver metastases  Elevated CA 19-9  Postobstructive pneumonia due to unknown bacterial source  Transaminitis  Hyperbilirubinemia  Clinical dehydration  Leukocytosis  Mild hyponatremia  Hypertension  Type 2 diabetes mellitus  Hyperlipidemia    Hospital Course     Hospital Course:  Karen Goldstein is a 62 y.o. female who presents to the hospital with a 1 week history of nausea vomiting and diarrhea.  Patient states that the symptoms have mostly resolved however she presented to the hospital with feelings of dehydration.  Patient states she has been able to keep very little down over the past week.  In the emergency department the patient was evaluated, she underwent ultrasound, right upper quadrant which was negative for acute cholecystitis.  Patient underwent CT scan which was consistent with multiple metastatic lesions of the liver.  Gastroenterology was contacted by the emergency room physician who recommended MRI of the abdomen with and without contrast, MRCP to rule out obstruction and admission to the hospitalist service for further management.  MRCP  was negative for obstruction.  Further imaging revealed a 3.6 cm hilar mass with postobstructive pneumonia, metastatic lesions of the liver and sclerotic lesions of the bone.  Pulmonology was consulted.  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  She underwent liver biopsy on 7/8/2022.  She preferred to complete the work-up on an outpatient basis as she was otherwise feeling well on the day of discharge.  She will need outpatient PET scan.  She was discharged home in stable condition on 7/8/2022 complete 7 days of Augmentin.  Recommend follow-up with PCP and pulmonology within 1 week.  After results return, recommend PET scan and oncology referral.  Recommend follow-up with gastroenterology within 1 month.        DISCHARGE Follow Up Recommendations for labs and diagnostics: Outpatient PET scan, coordination with oncology and radiation oncology      Day of Discharge     Vital Signs:  Temp:  [97.5 °F (36.4 °C)-99 °F (37.2 °C)] 97.9 °F (36.6 °C)  Heart Rate:  [] 96  Resp:  [15-20] 16  BP: (124-142)/() 127/63  Flow (L/min):  [2] 2  Physical Exam:   Gen: NAD, WDWN  ENT: PERRL, EOMI   CV: RRR no MRG  Pulm: CTAB, no w/r/r  GI: Abd soft, NTND, +bs  Neuro: Moving all extremities spontaneously, CN II-XII grossly intact   Psych: A&O*3, normal mood and affect  Skin: No lesions or rashes noted      Discharge Details        Discharge Medications      New Medications      Instructions Start Date   amoxicillin-clavulanate 875-125 MG per tablet  Commonly known as: AUGMENTIN   1 tablet, Oral, Every 12 Hours Scheduled         Changes to Medications      Instructions Start Date   metFORMIN 500 MG tablet  Commonly known as: GLUCOPHAGE  What changed: when to take this   500 mg, Oral, Daily With Breakfast         Continue These Medications      Instructions Start Date   amLODIPine 10 MG tablet  Commonly known as: NORVASC   10 mg, Oral, Daily      Apple Cider Vinegar 188 MG  capsule   1 capsule, Oral, Daily      ascorbic acid 1000 MG tablet  Commonly known as: VITAMIN C   1,000 mg, Oral, Daily      atenolol 25 MG tablet  Commonly known as: TENORMIN   25 mg, Oral, Daily      B COMPLEX-B12 PO   1 tablet, Oral, Daily      Biotin 10 MG capsule   10 mg, Oral, Daily      Cholecalciferol 125 MCG (5000 UT) tablet   1 tablet, Oral, Daily      Coenzyme Q10 200 MG capsule   200 mg, Oral, Daily      cyclobenzaprine 10 MG tablet  Commonly known as: FLEXERIL   10 mg, Oral, 3 Times Daily PRN      lisinopril 40 MG tablet  Commonly known as: PRINIVIL,ZESTRIL   40 mg, Oral, Daily      Zinc 100 MG tablet   1 tablet, Oral, Daily             Allergies   Allergen Reactions   • Chantix [Varenicline] Nausea And Vomiting and Mental Status Change   • Codeine Nausea And Vomiting   • Ibuprofen GI Intolerance       Discharge Disposition:  Home or Self Care    Diet:  Hospital:  Diet Order   Procedures   • NPO Diet NPO Type: Sips with Meds       Discharge Activity:   Activity Instructions     Activity as Tolerated            CODE STATUS:  Code Status and Medical Interventions:   Ordered at: 07/07/22 0832     Level Of Support Discussed With:    Patient     Code Status (Patient has no pulse and is not breathing):    CPR (Attempt to Resuscitate)     Medical Interventions (Patient has pulse or is breathing):    Full Support         Future Appointments   Date Time Provider Department Center   7/14/2022  9:15 AM Kiesha Huynh APRN [facility]   9/30/2022  9:45 AM Lakshmi Gibson MD [facility]       Additional Instructions for the Follow-ups that You Need to Schedule     Discharge Follow-up with PCP   As directed       Currently Documented PCP:    Lakshmi Gibson MD    PCP Phone Number:    [phone]     Follow Up Details: 3-5 days         Discharge Follow-up with Specified Provider: Gastroenterology Dr. Gunn; 3 Weeks   As directed      To: Gastroenterology Dr. Gunn     Follow Up: 3 Weeks         Discharge Follow-up with Specified Provider: Oncology Dr. Moura or Dr. Steiner; 2 Weeks   As directed      To: Oncology Dr. Moura or Dr. Steiner    Follow Up: 2 Weeks         Discharge Follow-up with Specified Provider: Pulmonology Dr. Azar; 1 Week   As directed      To: Pulmonology Dr. Azar    Follow Up: 1 Week               Pertinent  and/or Most Recent Results     PROCEDURES:   Liver biopsy    LAB RESULTS:      Lab 07/07/22  0432 07/07/22  0138 07/06/22  1415   WBC 12.95*  --  16.86*   HEMOGLOBIN 15.3  --  17.0*   HEMATOCRIT 44.9  --  50.5*   PLATELETS 216  --  279   NEUTROS ABS 9.69*  --  12.82*   IMMATURE GRANS (ABS) 0.04  --  0.08*   LYMPHS ABS 2.00  --  2.29   MONOS ABS 1.14*  --  1.52*   EOS ABS 0.05  --  0.10   MCV 90.5  --  90.8   LACTATE  --   --  1.9   PROTIME  --  14.2  --          Lab 07/07/22  0432 07/06/22  1415   SODIUM 135* 133*   POTASSIUM 4.4 4.3   CHLORIDE 101 99   CO2 21.4* 20.8*   ANION GAP 12.6 13.2   BUN 15 15   CREATININE 0.97 1.07*   EGFR 65.4 58.1*   GLUCOSE 103* 103*   CALCIUM 9.8 10.2   MAGNESIUM 2.1  --    PHOSPHORUS 3.9  --          Lab 07/07/22  0432 07/06/22  1415   TOTAL PROTEIN 6.3 7.3   ALBUMIN 3.10* 3.50   GLOBULIN 3.2 3.8   ALT (SGPT) 93* 121*   AST (SGOT) 156* 189*   BILIRUBIN 2.9* 2.5*   ALK PHOS 325* 385*   LIPASE  --  80*         Lab 07/07/22  0138   PROTIME 14.2   INR 1.08                 Brief Urine Lab Results  (Last result in the past 365 days)      Color   Clarity   Blood   Leuk Est   Nitrite   Protein   CREAT   Urine HCG        07/06/22 1427 Dark Yellow   Clear   Negative   Trace   Negative   Trace               Microbiology Results (last 10 days)     ** No results found for the last 240 hours. **          CT Chest Without Contrast Diagnostic    Result Date: 7/7/2022  Impression:   1. There is a right hilar mass measuring up to at least 3.6 centimeters causing narrowing of the right mainstem bronchus and right upper lobe bronchi.   Lack of contrast limits evaluation but the findings are most concerning for neoplasm. 2. Mediastinal adenopathy including the right paratracheal, precarinal, and subcarinal regions most concerning for metastatic disease.  There may be small left hilar lymph nodes. 3. 7 millimeter left lower lobe lung nodule.  This could be due to neoplasm.  There is also a tiny left lower lobe lung nodule measuring about 3 millimeters in tiny 3 millimeter right lower lobe lung nodule. 4. Nodular opacity in the right upper lobe likely reflects postobstructive pneumonia. 5. Additional medial anterior airspace opacity in the right upper lobe could reflect postobstructive atelectasis or pneumonia. 6. Innumerable liver lesions better seen on recent CT abdomen and pelvis with contrast.  There is a small collection of ascites anterior to the liver. 7. Small sclerotic lesion in the left 3rd rib is indeterminate there is other areas of heterogeneous sclerosis within the posterior ribs although appears fairly symmetrical and is also indeterminate for neoplasm. 8. Other findings as above.     MICHAEL CARLIN MD       Electronically Signed and Approved By: MICHAEL CARLIN MD on 7/07/2022 at 17:42             CT Abdomen Pelvis With Contrast    Result Date: 7/6/2022  Impression:   1. Small 7 millimeter left lower lobe lung nodule.  Metastatic lesion is possible. 2. Slightly nodular contour liver could indicate cirrhosis. 3. Innumerable lesions are seen scattered throughout the liver.  The largest measures up to approximately 2 centimeters.  Findings concerning for primary secondary neoplasm.  Biopsy would be useful.  4. There is adenopathy adjacent to the proximal pancreas and in the kady hepatis measuring up to about 4.5 centimeters in long axis most concerning for metastatic adenopathy. 5. No intrinsic pancreatic lesion clearly seen.  Nine scarring is seen involving both kidneys. 6. Fibroids in the uterus. 7. Small amount of ascites in the  pelvis indeterminate. 8. Small amount of stranding or fluid adjacent to the distal rectum.  Correlate with exam.  This air is difficult to evaluate due to incomplete distention.     MICHAEL CARLIN MD       Electronically Signed and Approved By: MICHAEL CARLIN MD on 7/06/2022 at 19:36             US Gallbladder    Result Date: 7/6/2022  Impression:   1. There is mild hepatomegaly. 2. Heterogeneous slightly nodular liver concerning for cirrhosis. 3. 3 centimeter structure is seen near the proximal pancreas.  Lesion or mass is possible.  Recommend follow-up CT. 4. There is sludge and possibly few stones in the gallbladder.  The gallbladder wall measures up to 5 millimeters.  Cholecystitis in the differential.      MICHAEL CARLIN MD       Electronically Signed and Approved By: MICHAEL CARLIN MD on 7/06/2022 at 17:36             MRI abdomen w wo contrast mrcp    Result Date: 7/6/2022  Impression:   1. There is an abnormal MRI appearance of the liver, which is thought to be most suggestive of diffuse hepatic metastases, estimated at 4 cm or less in size.  Cirrhosis with regenerative nodules would be in the differential diagnosis but is probably less likely.  Consider liver biopsy for further assessment if clinically warranted and if not contraindicated.  2. There are enlarged lymph nodes involving the upper abdomen, particularly kady hepatis and portacaval lymph nodes, as discussed.  3. Intraosseous metastatic disease may be present, especially involving the bony pelvis.  4. There may be gallstones.  No definite acute cholecystitis.  Please correlate with pertinent lab values.  5. No biliary ductal dilatation is suggested.  No choledocholithiasis.  No pancreatic ductal dilatation is seen.  6. No adrenal mass.  7. There is a 7 mm noncalcified left lower lobe pulmonary nodule.  This finding is indeterminate.  It is better seen on the CT study from earlier during the same day.  8. No acute pancreatitis.  No  definite pancreatic mass is suggested.  9. There may be tiny renal cysts.  Renal cortical scarring is seen, greater on the left.  No hydronephrosis.  10. There is a small amount of ascites.  11. Degenerative changes involve the imaged spine with suspected severe spinal stenosis at L1-2 and L2-3.  12. Please see above comments for further detail.      COMMENT:  Part of this note is an electronic transcription of spoken language to printed text. The electronic translation/transcription may permit erroneous, or at times, nonsensical (or even sensical) words or phrases to be inadvertently transcribed or omitted; this  has reviewed the note for such errors (as well as additional errors); however, some may still exist.  MARAH KIDD JR, MD       Electronically Signed and Approved By: MARAH KIDD JR, MD on 7/06/2022 at 23:21                  CT Chest Without Contrast Diagnostic    Result Date: 7/7/2022  Narrative: PROCEDURE: CT CHEST WO CONTRAST DIAGNOSTIC  COMPARISON: [facility], CT, CT ABDOMEN PELVIS W CONTRAST, 7/06/2022, 19:09.  INDICATIONS: malignancy,unknown primary; lung nodule  TECHNIQUE: CT images were created without the administration of contrast material.   PROTOCOL:   Standard imaging protocol performed    RADIATION:   DLP: 165 mGy*cm   Automated exposure control was utilized to minimize radiation dose.  FINDINGS:  Chest CT without contrast demonstrates normal appearing thyroid.  There is abnormal adenopathy including a right paratracheal lymph node measuring 2.1 x 1.5 centimeters.  Other smaller right paratracheal lymph nodes are seen.  There are 2 precarinal lymph nodes measuring up to about 1.4 centimeters.  Subcarinal node measures about 2.4 x 2.4 centimeters.  Right hilar mass measures about 3.6 x 2.9 centimeters causing some narrowing of the right mainstem bronchus.  There obstruction of right upper lobe bronchi.  There are a few smaller left hilar lymph nodes.  Heart is  normal in size.  There is mild vascular calcification.  No significant pleural effusion.  Aorta is normal in size.  Numerous liver nodules better seen on recent CT.  There is a liver cyst is seen.  Small collection of ascites could be loculated anterior to the left measuring 3.8 centimeters.  Slightly nodular contour of the liver.   7 millimeter left lower lobe lung nodules again noted.  There is a small 3 millimeter left lower lobe lung nodule.  There is a tiny 3 millimeter right lower lobe lung nodule.  There is nodular opacity in the right upper lobe.  There is airspace opacity in the right upper lobe.  Small sclerotic lesions seen in the left 3rd rib.  Other bilateral posterior ribs demonstrates some mixed areas of sclerosis but appear fairly symmetrical.        Impression:   1. There is a right hilar mass measuring up to at least 3.6 centimeters causing narrowing of the right mainstem bronchus and right upper lobe bronchi.  Lack of contrast limits evaluation but the findings are most concerning for neoplasm. 2. Mediastinal adenopathy including the right paratracheal, precarinal, and subcarinal regions most concerning for metastatic disease.  There may be small left hilar lymph nodes. 3. 7 millimeter left lower lobe lung nodule.  This could be due to neoplasm.  There is also a tiny left lower lobe lung nodule measuring about 3 millimeters in tiny 3 millimeter right lower lobe lung nodule. 4. Nodular opacity in the right upper lobe likely reflects postobstructive pneumonia. 5. Additional medial anterior airspace opacity in the right upper lobe could reflect postobstructive atelectasis or pneumonia. 6. Innumerable liver lesions better seen on recent CT abdomen and pelvis with contrast.  There is a small collection of ascites anterior to the liver. 7. Small sclerotic lesion in the left 3rd rib is indeterminate there is other areas of heterogeneous sclerosis within the posterior ribs although appears fairly  symmetrical and is also indeterminate for neoplasm. 8. Other findings as above.     MICHAEL CARLIN MD       Electronically Signed and Approved By: MICHAEL CARLIN MD on 7/07/2022 at 17:42             CT Abdomen Pelvis With Contrast    Result Date: 7/6/2022  Narrative: PROCEDURE: CT ABDOMEN PELVIS W CONTRAST  COMPARISON: None  INDICATIONS: epigastric abdominal pain, nausea, diarrhea  TECHNIQUE: After obtaining the patient's consent, CT images were created with non-ionic intravenous contrast material.   PROTOCOL:   Standard imaging protocol performed    RADIATION:   DLP: 616 mGy*cm   Automated exposure control was utilized to minimize radiation dose. CONTRAST: 100 cc Isovue 370 I.V.  FINDINGS:  7 millimeter nodule left lower lobe.  There may be a small amount of fluid anterior to the liver.  Slightly nodular contour of the liver.  Innumerable small lesions seen scattered throughout the liver the largest measuring up to about 2 centimeters concerning for metastatic disease.  No pancreatic lesion but there is adenopathy in kady hepatis near the pancreatic head measuring up to about 4.5 centimeters.  No adrenal lesion.  There is scarring involving the kidneys greater on the left.  No obstructive uropathy.  Bladder is unremarkable.  Uterus demonstrates calcifications and fibroids.  Ovaries not well seen.  There is small amount of ascites in the pelvis.  No evidence for appendicitis.  There is slight stranding adjacent to the rectum age indeterminate.  The stomach demonstrates no lesion.  No obstructive uropathy is seen.  There is mild atherosclerosis.  Point spine degenerative changes are seen.       Impression:   1. Small 7 millimeter left lower lobe lung nodule.  Metastatic lesion is possible. 2. Slightly nodular contour liver could indicate cirrhosis. 3. Innumerable lesions are seen scattered throughout the liver.  The largest measures up to approximately 2 centimeters.  Findings concerning for primary secondary  neoplasm.  Biopsy would be useful.  4. There is adenopathy adjacent to the proximal pancreas and in the kady hepatis measuring up to about 4.5 centimeters in long axis most concerning for metastatic adenopathy. 5. No intrinsic pancreatic lesion clearly seen.  Nine scarring is seen involving both kidneys. 6. Fibroids in the uterus. 7. Small amount of ascites in the pelvis indeterminate. 8. Small amount of stranding or fluid adjacent to the distal rectum.  Correlate with exam.  This air is difficult to evaluate due to incomplete distention.     MICHAEL CARLIN MD       Electronically Signed and Approved By: MICHAEL CARLIN MD on 7/06/2022 at 19:36             US Gallbladder    Result Date: 7/6/2022  Narrative: PROCEDURE: US GALLBLADDER  COMPARISON:  INDICATIONS: pain  TECHNIQUE: A limited ultrasound examination of the right upper quadrant was performed.   FINDINGS:  3 centimeter hypoechoic structure in the region of the proximal pancreas.  Heterogeneous nodular liver.  Liver measures 21 centimeters in length.  Portal venous flow is normal.  Hepatic veins are patent.  Gallbladder and straits a thick wall.  There is sludge in the gallbladder.  May be a few stones in the gallbladder.  The gallbladder wall measures 5 millimeters.  Negative sonographic Cabrera sign.  The right kidney is within normal limits.  Common duct measures about 5 millimeters.      Impression:   1. There is mild hepatomegaly. 2. Heterogeneous slightly nodular liver concerning for cirrhosis. 3. 3 centimeter structure is seen near the proximal pancreas.  Lesion or mass is possible.  Recommend follow-up CT. 4. There is sludge and possibly few stones in the gallbladder.  The gallbladder wall measures up to 5 millimeters.  Cholecystitis in the differential.      MICHAEL CARLIN MD       Electronically Signed and Approved By: MICHAEL CARLIN MD on 7/06/2022 at 17:36             MRI abdomen w wo contrast mrcp    Result Date: 7/6/2022  Narrative:  PROCEDURE: MRI ABDOMEN W WO CONTRAST MRCP  COMPARISONS: [facility], CT, CT ABD-PELVIS W CONTRAST, 7/06/2022, 19:09.   [facility], US, US GALLBLADDER, 7/06/2022, 16:22.  INDICATIONS: Abdominal mass, intra-abdominal neoplasm suspected.  CONTRAST:  18 mL MultiHance I.V. (gadobenate dimeglumine).  TECHNIQUE: A comprehensive examination was performed utilizing a variety of imaging planes and imaging parameters to optimize visualization of suspected pathology.  1,003 magnetic resonance (MR) images were obtained both before and after intravenous gadolinium injection.  Magnetic resonance cholangiopancreatography (MRCP) was also performed.  There is slight motion artifact on the exam.  FINDINGS: There is diffuse hepatic steatosis with hepatomegaly (i.e., the liver is about 28.4 cm in maximum craniocaudal dimension, as seen on image 17 of series 19).  Multiple hepatic nodules/masses are suggested, as with hepatic metastases.  Cirrhosis with nodular regenerative disease would be in the differential diagnosis.  CT-guided percutaneous needle liver biopsy may be helpful in further assessment if clinically warranted and if not already performed.  The hepatic nodules/masses measure about 4 cm in greatest axial diameter.  A benign 2.1 cm hepatic cyst is also seen involving the superior right lobe of the liver, as on image 32 of series 8.  A small amount of ascites is seen.  No biliary ductal dilatation is seen.  The distal common bile duct measures about 4 mm in maximum diameter.  No pancreatic ductal dilatation is seen.  Tiny gallstones are possible.  Probably no definite MRI evidence of acute cholecystitis.  Please correlate with pertinent lab values.  No acute pancreatitis.  There are incidental degenerative changes of the imaged spine with severe spinal stenosis suspected at the L1-2 and L2-3 levels.  There may be mild levoscoliosis of the lumbar spine.  No splenomegaly is suspected.  There are  enlarged lymph nodes involving the kady hepatis and the portacaval regions.  These lymph nodes may measure as large as 3.5 x 3.5 x 3 cm in AP (anteroposterior), transverse, and craniocaudal extent, respectively.  Some of the lymph nodes may measure slightly larger.  There is a noncalcified, non-cavitating 7 mm subpleural nodule in the posterolateral left lower lobe, as seen on image 38 of series 9. This finding was better seen on the recent CT study from earlier during the same day.  No definite adrenal mass is seen.  Renal cortical scarring is identified, greater on the left than the right.  There may be tiny renal cysts (1 cm less in size).  They are probably too small to characterize by this study.  No definite pancreatic mass is appreciated.    Diffuse osseous metastatic disease is also suspected, especially involving the bony pelvis.      Impression:   1. There is an abnormal MRI appearance of the liver, which is thought to be most suggestive of diffuse hepatic metastases, estimated at 4 cm or less in size.  Cirrhosis with regenerative nodules would be in the differential diagnosis but is probably less likely.  Consider liver biopsy for further assessment if clinically warranted and if not contraindicated.  2. There are enlarged lymph nodes involving the upper abdomen, particularly kady hepatis and portacaval lymph nodes, as discussed.  3. Intraosseous metastatic disease may be present, especially involving the bony pelvis.  4. There may be gallstones.  No definite acute cholecystitis.  Please correlate with pertinent lab values.  5. No biliary ductal dilatation is suggested.  No choledocholithiasis.  No pancreatic ductal dilatation is seen.  6. No adrenal mass.  7. There is a 7 mm noncalcified left lower lobe pulmonary nodule.  This finding is indeterminate.  It is better seen on the CT study from earlier during the same day.  8. No acute pancreatitis.  No definite pancreatic mass is suggested.  9. There may  be tiny renal cysts.  Renal cortical scarring is seen, greater on the left.  No hydronephrosis.  10. There is a small amount of ascites.  11. Degenerative changes involve the imaged spine with suspected severe spinal stenosis at L1-2 and L2-3.  12. Please see above comments for further detail.      COMMENT:  Part of this note is an electronic transcription of spoken language to printed text. The electronic translation/transcription may permit erroneous, or at times, nonsensical (or even sensical) words or phrases to be inadvertently transcribed or omitted; this  has reviewed the note for such errors (as well as additional errors); however, some may still exist.  MARAH KIDD JR, MD       Electronically Signed and Approved By: MARAH KIDD JR, MD on 7/06/2022 at 23:21                    Time spent on Discharge including face to face service:  34 minutes    Electronically signed by Abdoul Smith MD, 07/08/22, 3:27 PM EDT.

## 2022-07-08 NOTE — THERAPY EVALUATION
Acute Care - Physical Therapy Initial Evaluation   [facility]     Patient Name: Karen Goldstein  : 1960  MRN: [id number]  Today's Date: 2022      Visit Dx:     ICD-10-CM ICD-9-CM   1. Right upper quadrant abdominal pain  R10.11 789.01   2. Liver metastasis (HCC)  C78.7 197.7   3. Elevated liver enzymes  R74.8 790.5   4. Elevated bilirubin  R17 277.4   5. Difficulty walking  R26.2 719.7     Patient Active Problem List   Diagnosis   • DM2 (diabetes mellitus, type 2) (HCC)   • Essential hypertension   • Hyperlipidemia   • Sciatica   • Arthritis   • Tobacco abuse   • Nicotine dependence, cigarettes, uncomplicated    • Left foot pain   • Telogen effluvium   • Right upper quadrant abdominal pain     Past Medical History:   Diagnosis Date   • Arthritis    • Cervicalgia 2017   • Diabetes mellitus (HCC)    • DM2 (diabetes mellitus, type 2) (HCC)    • Essential hypertension    • Foot pain, left    • Hyperlipidemia    • Hypertension    • Hypertension    • Lumbago 2017    low back pain   • Paresthesia 2017   • Sciatica 2017     Past Surgical History:   Procedure Laterality Date   • BACK SURGERY     • OTHER SURGICAL HISTORY      discectomy   • TONSILLECTOMY       PT Assessment (last 12 hours)     PT Evaluation and Treatment     Row Name 22 1600          Physical Therapy Time and Intention    Subjective Information no complaints  -DP     Document Type evaluation  -DP     Mode of Treatment individual therapy;physical therapy  -DP     Patient Effort good  -DP     Row Name 22 1600          General Information    Patient Profile Reviewed yes  -DP     Patient Observations alert;cooperative;agree to therapy  -DP     Prior Level of Function independent:;gait;transfer;bed mobility;ADL's  -DP     Equipment Currently Used at Home none  -DP     Existing Precautions/Restrictions no known precautions/restrictions  -DP     Row Name 22 1600          Living Environment    Current Living  Arrangements home  -DP     Home Accessibility stairs to enter home  -DP     People in Home significant other  -DP     Row Name 07/08/22 1600          Home Main Entrance    Number of Stairs, Main Entrance none  -DP     Row Name 07/08/22 1600          Range of Motion (ROM)    Range of Motion bilateral lower extremities;ROM is WFL  -DP     Row Name 07/08/22 1600          Strength (Manual Muscle Testing)    Strength (Manual Muscle Testing) bilateral lower extremities;strength is WFL  -DP     Row Name 07/08/22 1600          Bed Mobility    Bed Mobility supine-sit-supine  -DP     Supine-Sit-Supine [place] (Bed Mobility) standby assist  -DP     Row Name 07/08/22 1600          Transfers    Transfers sit-stand transfer  -DP     Sit-Stand [place] (Transfers) supervision  -DP     Row Name 07/08/22 1600          Balance    Balance Assessment standing dynamic balance  -DP     Dynamic Standing Balance supervision  -DP     Row Name             Wound 07/08/22 1532 Right lateral abdomen Incision    Wound - Properties Group Placement Date: 07/08/22  - Placement Time: 1532  -SM Side: Right  -SM Orientation: lateral  -SM Location: abdomen  -SM Primary Wound Type: Incision  -SM Additional Comments: CT Guided Liver Biopsy  -SM     Retired Wound - Properties Group Placement Date: 07/08/22  - Placement Time: 1532  -SM Side: Right  -SM Orientation: lateral  -SM Location: abdomen  -SM Primary Wound Type: Incision  -SM Additional Comments: CT Guided Liver Biopsy  -SM     Retired Wound - Properties Group Date first assessed: 07/08/22  - Time first assessed: 1532  -SM Side: Right  -SM Location: abdomen  -SM Primary Wound Type: Incision  -SM Additional Comments: CT Guided Liver Biopsy  -SM     Row Name 07/08/22 1600          Plan of Care Review    Plan of Care Reviewed With patient  -DP     Outcome Evaluation Patient is able to complete all transfers and ambulate ad bob. in her room.  She does not need inpatient PT services and  is being discharged from the hospital later today.  -DP     Row Name 07/08/22 1600          Therapy Assessment/Plan (PT)    Criteria for Skilled Interventions Met (PT) no problems identified which require skilled intervention  -DP     Therapy Frequency (PT) evaluation only  -DP     Row Name 07/08/22 1600          PT Evaluation Complexity    History, PT Evaluation Complexity no personal factors and/or comorbidities  -DP     Examination of Body Systems (PT Eval Complexity) total of 4 or more elements  -DP     Clinical Presentation (PT Evaluation Complexity) stable  -DP     Clinical Decision Making (PT Evaluation Complexity) low complexity  -DP     Overall Complexity (PT Evaluation Complexity) low complexity  -DP           User Key  (r) = Recorded By, (t) = Taken By, (c) = Cosigned By    Initials Name Provider Type    Starla Cosby RN Registered Nurse    Melchor Barragan, PT Physical Therapist                  PT Recommendation and Plan  Anticipated Discharge Disposition (PT): home with assist  Therapy Frequency (PT): evaluation only  Plan of Care Reviewed With: patient  Outcome Evaluation: Patient is able to complete all transfers and ambulate ad bob. in her room.  She does not need inpatient PT services and is being discharged from the hospital later today.   Outcome Measures     Row Name 07/08/22 1600             How much help from another person do you currently need...    Turning from your back to your side while in flat bed without using bedrails? 4  -DP      Moving from lying on back to sitting on the side of a flat bed without bedrails? 4  -DP      Moving to and from a bed to a chair (including a wheelchair)? 4  -DP      Standing up from a chair using your arms (e.g., wheelchair, bedside chair)? 4  -DP      Climbing 3-5 steps with a railing? 4  -DP      To walk in hospital room? 4  -DP      AM-PAC 6 Clicks Score (PT) 24  -DP              Functional Assessment    Outcome Measure Options AM-PAC 6 Clicks  Basic Mobility (PT)  -DP            User Key  (r) = Recorded By, (t) = Taken By, (c) = Cosigned By    Initials Name Provider Type    Melchor Barragan, PT Physical Therapist                 Time Calculation:    PT Charges     Row Name 07/08/22 1611             Time Calculation    PT Received On 07/08/22  -DP              Untimed Charges    PT Eval/Re-eval Minutes 40  -DP              Total Minutes    Untimed Charges Total Minutes 40  -DP       Total Minutes 40  -DP            User Key  (r) = Recorded By, (t) = Taken By, (c) = Cosigned By    Initials Name Provider Type    Melchor Barragan, PT Physical Therapist              Therapy Charges for Today     Code Description Service Date Service Provider Modifiers Qty    88285057623 HC PT EVAL LOW COMPLEXITY 3 7/8/2022 Melchor Rodríguez, PT GP 1          PT G-Codes  Outcome Measure Options: AM-PAC 6 Clicks Basic Mobility (PT)  AM-PAC 6 Clicks Score (PT): 24    Melchor Rodríguez PT  7/8/2022

## 2022-07-08 NOTE — NURSING NOTE
Pt returned to Rad holding via stretcher s/p CT Guided Liver Biopsy. VSS. Site CDI. No c/o pain at present. Drink provided to pt. Report called to floor RN and transport put in.

## 2022-07-08 NOTE — CONSULTS
[facility]   Consult Note    Patient Name: Karen Goldstein  : 1960  MRN: [id number]  Primary Care Physician:  Lakshmi Gibson MD  Referring Physician: No ref. provider found  Date of admission: 2022    Consults  Subjective   Subjective     Reason for Consult/ Chief Complaint: Reason for consultation hilar mass    History of Present Illness  Karen Goldstein is a 62 y.o. female admitted to the hospital 2022 with concern for dehydration.  Patient had been having 1 week of nausea and vomiting patient had been having symptoms for 1 week was not having no abdominal pain patient was found to have an ultrasound of the right upper quadrant showing acute cholecystitis underwent a CT scan that showed multiple metastatic hepatic lesions.  Patient underwent a CT scan of the chest which showed a large right hilar mass.  The patient states she has been having a cough and some mild shortness of breath worse for the course of the past several weeks, no chest pain, has no hemoptysis, no fevers no chills no night sweats.  The patient is a smoker she is down to 7 cigarettes on a daily basis.  Patient has no other aggravating factors no relieving factors no other associated symptoms at this time patient is set for a biopsy today.    Review of Systems   Constitutional: Positive for activity change.   HENT: Negative.    Eyes: Negative.    Respiratory: Negative.    Cardiovascular: Negative.    Gastrointestinal: Positive for nausea and vomiting.   Endocrine: Negative.    Genitourinary: Negative.    Musculoskeletal: Negative.    Skin: Negative.    Allergic/Immunologic: Negative.    Neurological: Negative.    Hematological: Negative.    Psychiatric/Behavioral: Negative.         Personal History     Past Medical History:   Diagnosis Date   • Arthritis    • Cervicalgia 2017   • Diabetes mellitus (HCC)    • DM2 (diabetes mellitus, type 2) (HCC)    • Essential hypertension    • Foot pain, left    • Hyperlipidemia     • Hypertension    • Hypertension    • Lumbago 03/07/2017    low back pain   • Paresthesia 03/07/2017   • Sciatica 03/07/2017       Past Surgical History:   Procedure Laterality Date   • BACK SURGERY     • OTHER SURGICAL HISTORY      discectomy   • TONSILLECTOMY         Family History: family history includes Arthritis in her father and sister; Other in her father and sister; Stroke in her mother. Otherwise pertinent FHx was reviewed and not pertinent to current issue.    Social History:  reports that she has been smoking cigarettes. She has a 20.00 pack-year smoking history. She has never used smokeless tobacco. She reports current alcohol use. She reports that she does not use drugs.    Home Medications:   Apple Cider Vinegar, B Complex Vitamins, Biotin, Cholecalciferol, Coenzyme Q10, Zinc, amLODIPine, amoxicillin-clavulanate, ascorbic acid, atenolol, cyclobenzaprine, lisinopril, and metFORMIN    Allergies:  Allergies   Allergen Reactions   • Chantix [Varenicline] Nausea And Vomiting and Mental Status Change   • Codeine Nausea And Vomiting   • Ibuprofen GI Intolerance       Objective    Objective     Vitals:  Temp:  [97.5 °F (36.4 °C)-97.9 °F (36.6 °C)] 97.9 °F (36.6 °C)  Heart Rate:  [] 106  Resp:  [15-26] 26  BP: (123-142)/() 127/94  Flow (L/min):  [2] 2    Physical Exam  Vital Signs Reviewed  General WDWN, Alert, NAD.    HEENT:  PERRL, EOMI.  OP, nares clear, no sinus tenderness  Neck:  Supple, no JVD, no thyromegaly  Lymph: no axillary, cervical, supraclavicular lymphadenopathy noted bilaterally  Chest:  good aeration, clear to auscultation bilaterally, tympanic to percussion bilaterally, no work of breathing noted  CV: RRR, no MGR, pulses 2+, equal.  Abd:  Soft, NT, ND, + BS, no HSM  EXT:  no clubbing, no cyanosis, no edema, no joint tenderness  Neuro:  A&Ox3, CN grossly intact, no focal deficits.  Skin: No rashes or lesions noted  Result Review    Result Review:  I have personally reviewed the  results from the time of this admission to 7/8/2022 16:14 EDT and agree with these findings:  [x]  Laboratory  [x]  Microbiology  [x]  Radiology  [x]  EKG/Telemetry   [x]  Cardiology/Vascular   []  Pathology  [x]  Old records  []  Other:    Most notable findings include: CT scan showing hilar mass    Assessment & Plan   Assessment / Plan     Brief Patient Summary:  Karen Goldstein is a 62 y.o. female who found to have right hilar mass with liver lesions    Active Hospital Problems:  Active Hospital Problems    Diagnosis    • Right upper quadrant abdominal pain      Assessment  Right hilar mass likely lung cancer  Hepatic lesions    Plan:   CT scan reviewed it looks like the patient has primary lung cancer with hepatic metastases    Patient to go for IR biopsy today    If IR biopsy unremarkable would recommend bronchoscopy with endobronchial ultrasound-guided biopsy of this right hilar mass    Explained this to the patient today    Planed to her the risk versus benefits of the biopsy including liver hematoma, bleeding and infection patient voices understanding wishes to proceed with procedure    Patient to follow-up with us in the office next week for results    Again if biopsy is inconclusive or if more tissue needed then would recommend bronchoscopy      CT scan reviewed showing right hilar mass with mediastinal adenopathy, case discussed with hospitalist, case discussed with nursing, case discussed with interventional radiology\  Electronically signed by Meño Azar DO, 07/08/22, 4:14 PM EDT.

## 2022-07-08 NOTE — PLAN OF CARE
Goal Outcome Evaluation:  Plan of Care Reviewed With: patient           Outcome Evaluation: Patient is able to complete all transfers and ambulate ad bob. in her room.  She does not need inpatient PT services and is being discharged from the hospital later today.

## 2022-07-08 NOTE — PLAN OF CARE
Goal Outcome Evaluation:  Plan of Care Reviewed With: patient        Progress: improving  Outcome Evaluation: Vitals WNL, no c/o, fluids infusing per order.

## 2022-07-09 NOTE — OUTREACH NOTE
Prep Survey    Flowsheet Row Responses   [facility] facility patient discharged from? [facility]   Is LACE score < 7 ? No   Emergency Room discharge w/ pulse ox? No   Eligibility [facility]   Hospital [facility]   Date of Admission 07/06/22   Date of Discharge 07/08/22   Discharge Disposition Home or Self Care   Discharge diagnosis Abd pain- malignancy   Does the patient have one of the following disease processes/diagnoses(primary or secondary)? Other   Does the patient have Home health ordered? No   Is there a DME ordered? No   Prep survey completed? Yes          CELESTINA A - Registered Nurse

## 2022-07-11 NOTE — OUTREACH NOTE
Call Center TCM Note    Flowsheet Row Responses   [facility] patient discharged from? [facility]   Does the patient have one of the following disease processes/diagnoses(primary or secondary)? Other   TCM attempt successful? No  [verbal release is ]   Unsuccessful attempts Attempt 1          Niharika Jones RN    2022, 11:07 EDT

## 2022-07-11 NOTE — OUTREACH NOTE
Call Center TCM Note    Flowsheet Row Responses   [facility] patient discharged from? [facility]   Does the patient have one of the following disease processes/diagnoses(primary or secondary)? Other   TCM attempt successful? No   Unsuccessful attempts Attempt 2          Niharika Jones RN    7/11/2022, 16:13 EDT

## 2022-07-12 NOTE — TELEPHONE ENCOUNTER
Provider: DR. YIN    Caller: SERGIO    Relationship to Patient: [facility]    Phone Number: [phone]    Reason for Call: SERGIO FROM [facility] IS CALLING TO REPORT MALIGNANCY FOR THE PATIENT. THE LIVER LESION BIOPSY IT IS METASTATIC SMALL CELL CARCINOMA

## 2022-07-12 NOTE — OUTREACH NOTE
Call Center TCM Note    Flowsheet Row Responses   [facility] patient discharged from? [facility]   Does the patient have one of the following disease processes/diagnoses(primary or secondary)? Other   TCM attempt successful? No  [verbal release is ]   Unsuccessful attempts Attempt 3          Niharika Jones RN    2022, 08:40 EDT

## 2022-07-13 NOTE — TELEPHONE ENCOUNTER
PTS  CALLING TO SPEAK WITH [facility] REGARDING MOVING PTS NEW PT APPT UP SOONER THAN July 19 - W/T PTS  TO [facility] TO FURTHER ASSIST.

## 2022-07-13 NOTE — TELEPHONE ENCOUNTER
I called and lvm for pablo giraldo office due to pt  had asked me to call and see if they could reschedule her appt due to we were able to get her in our office tomorrow for appt and they thought that it was more important to come and see the oncologist first. Pt  would like a call back from their office to reschedule appt.

## 2022-07-14 PROBLEM — C34.90 SMALL CELL LUNG CANCER (HCC): Status: ACTIVE | Noted: 2022-01-01

## 2022-07-14 PROBLEM — C34.01 MALIGNANT NEOPLASM OF HILUS OF RIGHT LUNG: Status: ACTIVE | Noted: 2022-01-01

## 2022-07-14 PROBLEM — C34.90 SMALL CELL LUNG CANCER (HCC): Status: RESOLVED | Noted: 2022-01-01 | Resolved: 2022-01-01

## 2022-07-14 PROBLEM — C79.51 BONE METASTASIS: Status: ACTIVE | Noted: 2022-01-01

## 2022-07-14 NOTE — ASSESSMENT & PLAN NOTE
"Patient recently diagnosed with small cell lung cancer while in the hospital.  Her hospital records reviewed.  She is admitted for increasing abdominal pain.  Imaging studies demonstrated markedly enlarged liver with multiple hypodensities concerning for malignancy.  She also had a mass in the right hilum, mediastinal adenopathy as well.  Biopsy of the liver positive for small cell carcinoma.  This is consistent with stage IV metastatic disease.  I would recommend PET scan and MRI of the brain to complete the staging evaluation- CT of the head while inpatient was suggestive of a lesion in the frontal lobe.  We discussed that this is unfortunately not curable but can be treated to offer improvement in symptoms as well as some prolongation life.   Hospice/palliative care was discussed focusing on quality of life while not specifically addressing the malignancy however she is interested in treatment.    We discussed the possibility of clinical trials and she would be interested in evaluation at [facility].  That referral will be made.  I will request next generation sequencing on her biopsy specimen which may offer additional treatment options.  With regard to standard treatment, I would recommend chemoimmunotherapy consisting of carboplatin, etoposide, durvalumab given as a 3-day regimen every 3 weeks for 4 cycles and then continuing immunotherapy as \"maintenance\" based upon response.  Side effects, risk and benefits discussed in detail including alopecia, mucositis, nausea, vomiting, taste changes, diarrhea, liver dysfunction, kidney dysfunction, thyroid abnormalities, rash, low blood counts, arthritis, pneumonitis, neurologic problems including numbness and weakness.  Written teaching information provided.  Patient is agreeable to treatment as outlined.  She reports poor venous access and will be referred to surgery for Port-A-Cath placement.  She will have lab work today to ensure adequate endorgan " functions and blood counts.  Prescriptions for Zofran and Compazine provided as needed for nausea.  She will be referred to dietitian for evaluation to help maximize her nutrition intake.  I will plan her initial cycle next week and I will see her back for cycle 2, day 1 with lab work prior to monitor for toxicities.  I would plan restaging scans after cycle 2.

## 2022-07-14 NOTE — ASSESSMENT & PLAN NOTE
Patient has bone metastases identified on CT scan.  We discussed the use of Xgeva 120 mg subcutaneously monthly to decrease the incidence of skeletal events including fractures.  Side effects discussed including injection site reactions, decreases in electrolyte levels such as calcium, magnesium, phosphorus and rare instance of osteonecrosis of the jaw-around 2%.  Patient agreeable to the treatment and I will initiate Xgeva with her initial palliative chemotherapy cycle.

## 2022-07-14 NOTE — PROGRESS NOTES
Distress Screening Follow-Up    Diagnosis: Metastatic lung cancer    Date of Distress Screenin22  Location of Distress Screening: Med onc  Distress Level: 4  Problems Indicated: Pain, fatigue, changes in eating, loss of interest or enjoyment (due to physical constraints), relationship with partner, finances, access to medicine    Comments: OSW met with pt in med onc clinic during consultation to f/u in regards to identified distress. Pt presented in a very pleasant mood. Introduced myself and discussed OSW role/psychosocial services available. Completed distress assessment with pt today. Pt's significant other of over 30 years was present with her this morning during her consult, emotionally distressed, however, left the room already due to pt needing labs.  Pt and spouse resides in [place] with their dog. Pt is disabled, receives social security disability and has Medicare insurance. Pt reports she does not have prescription insurance. Pt looked into purchasing prescription coverage a few years back during open enrollment, however, was informed she would be penalized and does not feel the monthly expense will be worth it. Provided pt with a [employer] coupon for her compazine prescribed today. Encouraged pt to contact me anytime we prescribe her a new medication, as OSW is happy to get her connected to resources available. Pt v/u.  Pt reports her spouse is currently unemployed, explaining he decided to take a year off work back in November so they could travel together. Pt and spouse have recently traveled to [place], [place], [place], etc. Pt reports her mother passed away back in 2021 and received some inheritance, which is what helped fund their travel. In addition to pt's spouse, she has a son that resides in [place] and two siblings. Pt believes her older sister from [place] is planning to come visit her after recently receiving the news regarding pt's health.  Provided emotional  support throughout, utilizing empathy and validating and normalizing identified emotions. Discussed opportunity for mental health services (counseling,psych) and/or support services ([id number] support, support groups) for both pt and/or spouse. Pt did not express interest at this time. Pt reports she has put all of her matthew in God and knows where she'll go when it's her time. Discussed opportunity to refer pt to [facility] for palliative care. Provided pt with informational brochure today and highlighted the support services the program and staff can offer to both pt and spouse. Pt expressed interest in the program, however, declined a referral at this time.   Provided my business card, encouraging OSW support remains available. Pt expressed gratitude.    Services/Referrals Provided: Medication assistance - selvin truong; emotional support, palliative care discussion

## 2022-07-14 NOTE — PROGRESS NOTES
Chief Complaint  Lung Cancer    Lakshmi Gibson MD    Subjective          Karen Goldstein presents to [facility] for evaluation of newly diagnosed small cell lung cancer.  Patient reports that she has been having intermittent pain in her midepigastrium.  She describes it as having a rock sitting in the middle of her stomach.  This has happened off and on.  She notes more trouble with eating especially larger meals.  She does better when she has small frequent meals or liquids.  She denies shortness of breath or cough.  She denies new masses or adenopathy.  No unusual aches or pains.  She had a severe episode of her pain along with nausea and vomiting that precipitated a visit to the emergency room at [facility].  There is initial concern raised for gallbladder disease and she was admitted for further evaluation.  This included an MRCP which did not show any obstructive or biliary findings.  It did demonstrate multiple lesions in the liver.  CT of the chest also obtained showed a right hilar mass with mediastinal adenopathy and obstructive atelectasis.  She was seen by pulmonology and started on antibiotics with improvement in her symptoms.  She underwent biopsy of the liver and then elected to continue evaluation as an outpatient.  She was subsequently discharged.  She presents today for discussion of options for the newly diagnosed small cell.  She notes that she continues to have difficulty with eating but has been using nutrition supplements.  She has lost some weight.  She does state that she feels hungry but simply cannot eat large volumes.  She denies headache, blurry vision, mentation changes, numbness or weakness.  She denies chest pain, shortness of breath, cough or hemoptysis.  She is a long-term smoker but is actively working towards smoking cessation.    Oncology/Hematology History Overview Note   7/8/2022 Liver lesion, core biopsy: -Metastatic  small cell carcinoma     Malignant neoplasm of hilus of right lung (HCC)   7/14/2022 Initial Diagnosis    Malignant neoplasm of hilus of right lung (HCC)     7/14/2022 Cancer Staged    Staging form: Lung, AJCC 8th Edition  - Clinical: Stage IV (cT2, cN2, pM1) - Signed by Krishan Steiner MD on 7/14/2022 7/21/2022 -  Chemotherapy    OP SUPPORTIVE Denosumab (Xgeva) Q28D     7/21/2022 -  Chemotherapy    OP LUNG Durvalumab / Etoposide / Carboplatin (SCLC)     Bone metastasis (HCC)   7/14/2022 Initial Diagnosis    Bone metastasis (HCC)     7/14/2022 Cancer Staged    Staging form: Bone - Appendicular Skeleton, Trunk, Skull, And Facial Bones, AJCC 8th Edition  - Clinical: pM1 - Signed by Krishan Steiner MD on 7/14/2022 7/21/2022 -  Chemotherapy    OP SUPPORTIVE Denosumab (Xgeva) Q28D         Review of Systems   Constitutional: Positive for appetite change, diaphoresis and fatigue. Negative for fever, unexpected weight gain and unexpected weight loss.   HENT: Negative for hearing loss, mouth sores, sore throat, swollen glands, trouble swallowing and voice change.    Eyes: Negative for blurred vision.   Respiratory: Negative for cough, shortness of breath and wheezing.    Cardiovascular: Negative for chest pain and palpitations.   Gastrointestinal: Positive for abdominal pain, constipation, diarrhea and vomiting. Negative for blood in stool and nausea.   Endocrine: Negative for cold intolerance and heat intolerance.   Genitourinary: Negative for difficulty urinating, dysuria, frequency, hematuria and urinary incontinence.   Musculoskeletal: Negative for arthralgias, back pain and myalgias.   Skin: Negative for rash, skin lesions and wound.   Neurological: Positive for weakness. Negative for dizziness, seizures, numbness and headache.   Hematological: Does not bruise/bleed easily.   Psychiatric/Behavioral: Positive for depressed mood. The patient is not nervous/anxious.      Current Outpatient Medications on File  Prior to Visit   Medication Sig Dispense Refill   • amLODIPine (NORVASC) 10 MG tablet Take 1 tablet by mouth Daily. 30 tablet 5   • amoxicillin-clavulanate (AUGMENTIN) 875-125 MG per tablet Take 1 tablet by mouth Every 12 (Twelve) Hours for 12 doses. Indications: Pneumonia 12 tablet 0   • Apple Cider Vinegar 188 MG capsule Take 1 capsule by mouth Daily.     • ascorbic acid (VITAMIN C) 1000 MG tablet Take 1,000 mg by mouth Daily.     • atenolol (TENORMIN) 25 MG tablet Take 1 tablet by mouth Daily. 30 tablet 5   • B Complex Vitamins (B COMPLEX-B12 PO) Take 1 tablet by mouth Daily.     • Biotin 10 MG capsule Take 10 mg by mouth Daily.     • Cholecalciferol 125 MCG (5000 UT) tablet Take 1 tablet by mouth Daily.     • Coenzyme Q10 200 MG capsule Take 200 mg by mouth Daily.     • cyclobenzaprine (FLEXERIL) 10 MG tablet Take 1 tablet by mouth 3 (Three) Times a Day As Needed for Muscle Spasms. 90 tablet 0   • lisinopril (PRINIVIL,ZESTRIL) 40 MG tablet Take 1 tablet by mouth Daily. 30 tablet 5   • metFORMIN (GLUCOPHAGE) 500 MG tablet Take 1 tablet by mouth Daily With Breakfast. (Patient taking differently: Take 500 mg by mouth Every Night.) 30 tablet 5   • Zinc 100 MG tablet Take 1 tablet by mouth Daily.       No current facility-administered medications on file prior to visit.       Allergies   Allergen Reactions   • Chantix [Varenicline] Nausea And Vomiting and Mental Status Change   • Codeine Nausea And Vomiting   • Ibuprofen GI Intolerance     Past Medical History:   Diagnosis Date   • Arthritis    • Bone metastasis (HCC) 7/14/2022   • Cervicalgia 03/07/2017   • Diabetes mellitus (HCC)    • DM2 (diabetes mellitus, type 2) (HCC)    • Essential hypertension    • Foot pain, left    • Hyperlipidemia    • Hypertension    • Hypertension    • Lumbago 03/07/2017    low back pain   • Lung cancer (HCC)    • Malignant neoplasm of hilus of right lung (HCC) 7/14/2022   • Metastatic cancer (HCC)    • Paresthesia 03/07/2017   •  Sciatica 2017     Past Surgical History:   Procedure Laterality Date   • BACK SURGERY     • OTHER SURGICAL HISTORY      discectomy   • TONSILLECTOMY       Social History     Socioeconomic History   • Marital status: Single   Tobacco Use   • Smoking status: Former Smoker     Packs/day: 1.00     Years: 20.00     Pack years: 20.00     Types: Cigarettes     Quit date: 2022     Years since quittin.0   • Smokeless tobacco: Former User     Quit date: 2022   • Tobacco comment: smoked 11-20 years   Vaping Use   • Vaping Use: Never used   Substance and Sexual Activity   • Alcohol use: Yes     Comment: rare; less than 1 drink per day; been drinking 21-20 years    • Drug use: Never   • Sexual activity: Defer     Family History   Problem Relation Age of Onset   • Stroke Mother    • Other Father         spine problems   • Arthritis Father    • Other Sister         spine problems   • Arthritis Sister    • Cancer Neg Hx    • Diabetes Neg Hx    • Heart disease Neg Hx        Objective   Physical Exam  Vitals reviewed. Exam conducted with a chaperone present.   Constitutional:       General: She is not in acute distress.     Appearance: Normal appearance.   HENT:      Head: Normocephalic and atraumatic.   Eyes:      Conjunctiva/sclera: Conjunctivae normal.      Pupils: Pupils are equal, round, and reactive to light.   Cardiovascular:      Rate and Rhythm: Normal rate and regular rhythm.      Heart sounds: Normal heart sounds. No murmur heard.    No gallop.   Pulmonary:      Effort: Pulmonary effort is normal.      Breath sounds: Normal breath sounds.   Abdominal:      General: Abdomen is flat. Bowel sounds are normal. There is no distension.      Tenderness: There is no abdominal tenderness.      Comments: Marked hepatomegaly extending across the midline and down toward the pelvis.  Mild tenderness to palpation without rebound or guarding   Musculoskeletal:      Cervical back: Neck supple.      Right lower leg: No  "edema.      Left lower leg: No edema.   Lymphadenopathy:      Cervical: No cervical adenopathy.   Neurological:      General: No focal deficit present.      Mental Status: She is alert and oriented to person, place, and time.   Psychiatric:         Mood and Affect: Mood normal.         Behavior: Behavior normal.         Vitals:    07/14/22 1010   BP: 99/59   Pulse: 88   Resp: 16   Temp: 98.4 °F (36.9 °C)   SpO2: 95%   Weight: 83.8 kg (184 lb 11.9 oz)   Height: 167.6 cm (66\")   PainSc:   2   PainLoc: Abdomen     ECOG score: 1         PHQ-9 Total Score:                    Result Review :   The following data was reviewed by: Krishan Steiner MD on 07/14/2022:  Lab Results   Component Value Date    HGB 15.8 07/14/2022    HCT 47.3 (H) 07/14/2022    MCV 92.2 07/14/2022     07/14/2022    WBC 14.43 (H) 07/14/2022    NEUTROABS 11.06 (H) 07/14/2022    LYMPHSABS 1.89 07/14/2022    MONOSABS 1.32 (H) 07/14/2022    EOSABS 0.04 07/14/2022    BASOSABS 0.02 07/14/2022     Lab Results   Component Value Date    GLUCOSE 126 (H) 07/14/2022    BUN 35 (H) 07/14/2022    CREATININE 1.29 (H) 07/14/2022     (L) 07/14/2022    K 4.6 07/14/2022     07/14/2022    CO2 19.3 (L) 07/14/2022    CALCIUM 9.4 07/14/2022    PROTEINTOT 5.9 (L) 07/14/2022    ALBUMIN 2.91 (L) 07/14/2022    BILITOT 5.6 (H) 07/14/2022    ALKPHOS 488 (H) 07/14/2022     (H) 07/14/2022     (H) 07/14/2022     Lab Results   Component Value Date    MG 2.1 07/07/2022    PHOS 3.0 07/14/2022    TSH 2.490 01/15/2021       Data reviewed: Hospital records reviewed including ER records, H&P, pulmonary consult, discharge summary.  Imaging studies reviewed including CT scan of the chest, abdomen, pelvis and head.  MRI/MRCP reviewed.  Biopsy report reviewed demonstrating small cell carcinoma.      Assessment and Plan    Diagnoses and all orders for this visit:    1. Malignant neoplasm of hilus of right lung (HCC) (Primary)  Assessment & Plan:  Patient " "recently diagnosed with small cell lung cancer while in the hospital.  Her hospital records reviewed.  She is admitted for increasing abdominal pain.  Imaging studies demonstrated markedly enlarged liver with multiple hypodensities concerning for malignancy.  She also had a mass in the right hilum, mediastinal adenopathy as well.  Biopsy of the liver positive for small cell carcinoma.  This is consistent with stage IV metastatic disease.  I would recommend PET scan and MRI of the brain to complete the staging evaluation- CT of the head while inpatient was suggestive of a lesion in the frontal lobe.  We discussed that this is unfortunately not curable but can be treated to offer improvement in symptoms as well as some prolongation life.   Hospice/palliative care was discussed focusing on quality of life while not specifically addressing the malignancy however she is interested in treatment.    We discussed the possibility of clinical trials and she would be interested in evaluation at [facility].  That referral will be made.  I will request next generation sequencing on her biopsy specimen which may offer additional treatment options.  With regard to standard treatment, I would recommend chemoimmunotherapy consisting of carboplatin, etoposide, durvalumab given as a 3-day regimen every 3 weeks for 4 cycles and then continuing immunotherapy as \"maintenance\" based upon response.  Side effects, risk and benefits discussed in detail including alopecia, mucositis, nausea, vomiting, taste changes, diarrhea, liver dysfunction, kidney dysfunction, thyroid abnormalities, rash, low blood counts, arthritis, pneumonitis, neurologic problems including numbness and weakness.  Written teaching information provided.  Patient is agreeable to treatment as outlined.  She reports poor venous access and will be referred to surgery for Port-A-Cath placement.  She will have lab work today to ensure adequate endorgan functions " and blood counts.  Prescriptions for Zofran and Compazine provided as needed for nausea.  She will be referred to dietitian for evaluation to help maximize her nutrition intake.  I will plan her initial cycle next week and I will see her back for cycle 2, day 1 with lab work prior to monitor for toxicities.  I would plan restaging scans after cycle 2.    Orders:  -     CBC & Differential; Future  -     Comprehensive Metabolic Panel; Future  -     Lactate Dehydrogenase; Future  -     MRI Brain With & Without Contrast; Future  -     NM PET/CT Skull Base to Mid Thigh; Future  -     Ambulatory Referral to General Surgery  -     prochlorperazine (COMPAZINE) 10 MG tablet; Take 1 tablet by mouth Every 6 (Six) Hours As Needed for Nausea or Vomiting.  Dispense: 20 tablet; Refill: 3  -     Ambulatory Referral to Oncology  -     CBC and Differential; Future  -     Comprehensive metabolic panel; Future  -     Magnesium; Future  -     Phosphorus; Future  -     Lab Appointment Request; Future  -     Clinic Appointment Request; Future  -     Infusion Appointment Request 3; Future  -     Ambulatory Referral to OP ONC Nutrition Services    2. Bone metastasis (HCC)  Assessment & Plan:  Patient has bone metastases identified on CT scan.  We discussed the use of Xgeva 120 mg subcutaneously monthly to decrease the incidence of skeletal events including fractures.  Side effects discussed including injection site reactions, decreases in electrolyte levels such as calcium, magnesium, phosphorus and rare instance of osteonecrosis of the jaw-around 2%.  Patient agreeable to the treatment and I will initiate Xgeva with her initial palliative chemotherapy cycle.    Orders:  -     CBC and Differential; Future  -     Comprehensive metabolic panel; Future  -     Magnesium; Future  -     Phosphorus; Future  -     Lab Appointment Request; Future  -     Clinic Appointment Request; Future  -     Infusion Appointment Request 3; Future          Patient  Follow Up: Cycle 2-day 1 on  Patient was given instructions and counseling regarding her condition or for health maintenance advice. Please see specific information pulled into the AVS if appropriate.     Krishan Steiner MD    7/14/2022

## 2022-07-15 NOTE — TELEPHONE ENCOUNTER
John called and left a vm. John states that the pt is not sleeping, not eating, and is fatigued. John states that the pt is a new cancer pt and was told by Dr Steiner that she has a swollen liver. John states that they went to the pharmacy and the only meds there were for nausea. John states that the pt needs medication for a swollen liver not nausea. John states that the pt missed her GI apt and tried to get into the GI group office today and the next available apt is in August. John states that the pt will not make it to August if she can not eat and sleep. John is request a medication to reduce swelling in the pt's liver. This nurse looked at the MD note from the pt's consult visit yesterday. The pt is a right lung cancer pt with possible mets to her liver. What do you suggest outside the dietary consult that has already been ordered.

## 2022-07-15 NOTE — PROGRESS NOTES
Primary Care Provider  Lakshmi Gibson MD     Referring Provider  No ref. provider found     Chief Complaint   Malignant neoplasm of hilus of right lung , Hospital Follow Up Visit, Shortness of Breath, Cough, and Wheezing    Subjective          Karen Goldstein presents to [facility]MON[facility]ICINE  History of Present Illness  Karen Goldstein is a 62 y.o. female patient recently admitted to [facility] on 7/6/2022 and discharged on 7/8/2022.  She is here for a post hospital follow-up visit today.    Per patient's discharge summary, she had presented to the hospital with 1 week of nausea, vomiting and diarrhea.  Her symptoms had mostly resolved, but did present with feelings of dehydration.  She underwent an ultrasound of the right upper quadrant in the emergency room, which was negative for acute cholecystitis.  CT scan was performed, which was consistent with multiple metastatic lesions of the liver.  MRCP was negative for obstruction.  Further imaging did reveal a 3.6 cm hilar mass with postobstructive pneumonia, metastatic lesions of the liver and sclerotic lesions of the bone.  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  Patient did undergo a liver biopsy on 7/8/2022.  Patient will need an outpatient PET scan.  She was discharged home to complete 7 days of Levaquin.    Patient's liver biopsy was positive for metastatic small cell carcinoma.  She did see oncology yesterday and a brain MRI and PET scan have been ordered.  Patient does admit to having some weight loss and difficulty with eating, but has an inability to eat large volumes of food.  She is a longtime smoker, and states that she is in the process of smoking cessation.  She smoked on average 1 pack a day for 35 to 40 years.  Oncology has made a referral to the [facility] for clinical trials, and she is scheduled to  see them next week.  Dr. Steiner is recommending treatment with chemoimmunotherapy.  Patient does report poor venous access and has been referred to surgery for a port placement.  She is scheduled to have a brain MRI this afternoon.  After speaking with Dr. Azar, since patient's liver biopsy showed a lung primary carcinoma, there is no need for a lung biopsy.    She states that she has not had a personal history of asthma or COPD.  She denies any fevers, chills or hemoptysis.  She does complain of fatigue and weakness.  She denies any family history of asthma, COPD or lung cancer.  She denies any occupational closure to chemicals.  Overall, patient has no additional concerns at this time.     Her history of smoking is   Tobacco Use: Medium Risk   • Smoking Tobacco Use: Former Smoker   • Smokeless Tobacco Use: Former User   .    Review of Systems   Constitutional: Positive for unexpected weight loss. Negative for chills, fatigue, fever and unexpected weight gain.   HENT: Negative for congestion (Nasal), hearing loss, mouth sores, nosebleeds, postnasal drip, sore throat and trouble swallowing.    Eyes: Negative for blurred vision and visual disturbance.   Respiratory: Negative for apnea, cough, shortness of breath and wheezing.         Negative for Hemoptysis     Cardiovascular: Negative for chest pain, palpitations and leg swelling.   Gastrointestinal: Negative for abdominal pain, constipation, diarrhea, nausea, vomiting and GERD.        Negative for Jaundice  Negative for Bloating  Negative for Melena   Musculoskeletal: Negative for joint swelling and myalgias.        Negative for Joint pain  Negative for Joint stiffness   Skin: Negative for color change.        Negative for cyanosis   Neurological: Negative for syncope, weakness, numbness and headache.      Sleep: Negative for Excessive daytime sleepiness  Negative for morning headaches  Negative for Snoring    Family History   Problem Relation Age of Onset   •  Stroke Mother    • Other Father         spine problems   • Arthritis Father    • Other Sister         spine problems   • Arthritis Sister    • Cancer Neg Hx    • Diabetes Neg Hx    • Heart disease Neg Hx         Social History     Socioeconomic History   • Marital status: Single   Tobacco Use   • Smoking status: Former Smoker     Packs/day: 1.00     Years: 35.00     Pack years: 35.00     Types: Cigarettes     Quit date: 2022     Years since quittin.0   • Smokeless tobacco: Former User     Quit date: 2022   • Tobacco comment: smoked 11-20 years   Vaping Use   • Vaping Use: Never used   Substance and Sexual Activity   • Alcohol use: Yes     Comment: rare; less than 1 drink per day; been drinking 21-20 years    • Drug use: Never   • Sexual activity: Defer        Past Medical History:   Diagnosis Date   • Arthritis    • Bone metastasis (HCC) 2022   • Cervicalgia 2017   • Diabetes mellitus (HCC)    • DM2 (diabetes mellitus, type 2) (HCC)    • Essential hypertension    • Foot pain, left    • Hyperlipidemia    • Hypertension    • Hypertension    • Lumbago 2017    low back pain   • Lung cancer (HCC)    • Malignant neoplasm of hilus of right lung (HCC) 2022   • Metastatic cancer (HCC)    • Paresthesia 2017   • Sciatica 2017          There is no immunization history on file for this patient.      Allergies   Allergen Reactions   • Chantix [Varenicline] Nausea And Vomiting and Mental Status Change   • Codeine Nausea And Vomiting   • Ibuprofen GI Intolerance          Current Outpatient Medications:   •  amLODIPine (NORVASC) 10 MG tablet, Take 1 tablet by mouth Daily., Disp: 30 tablet, Rfl: 5  •  Apple Cider Vinegar 188 MG capsule, Take 1 capsule by mouth Daily., Disp: , Rfl:   •  ascorbic acid (VITAMIN C) 1000 MG tablet, Take 1,000 mg by mouth Daily., Disp: , Rfl:   •  atenolol (TENORMIN) 25 MG tablet, Take 1 tablet by mouth Daily., Disp: 30 tablet, Rfl: 5  •  B Complex Vitamins (B  COMPLEX-B12 PO), Take 1 tablet by mouth Daily., Disp: , Rfl:   •  Biotin 10 MG capsule, Take 10 mg by mouth Daily., Disp: , Rfl:   •  Cholecalciferol 125 MCG (5000 UT) tablet, Take 1 tablet by mouth Daily., Disp: , Rfl:   •  Coenzyme Q10 200 MG capsule, Take 200 mg by mouth Daily., Disp: , Rfl:   •  cyclobenzaprine (FLEXERIL) 10 MG tablet, Take 1 tablet by mouth 3 (Three) Times a Day As Needed for Muscle Spasms., Disp: 90 tablet, Rfl: 0  •  lisinopril (PRINIVIL,ZESTRIL) 40 MG tablet, Take 1 tablet by mouth Daily., Disp: 30 tablet, Rfl: 5  •  metFORMIN (GLUCOPHAGE) 500 MG tablet, Take 1 tablet by mouth Daily With Breakfast. (Patient taking differently: Take 500 mg by mouth Every Night.), Disp: 30 tablet, Rfl: 5  •  prochlorperazine (COMPAZINE) 10 MG tablet, Take 1 tablet by mouth Every 6 (Six) Hours As Needed for Nausea or Vomiting., Disp: 20 tablet, Rfl: 3  •  Zinc 100 MG tablet, Take 1 tablet by mouth Daily., Disp: , Rfl:      Objective   Physical Exam  Constitutional:       General: She is not in acute distress.     Appearance: Normal appearance. She is normal weight.   HENT:      Right Ear: Hearing normal.      Left Ear: Hearing normal.      Nose: No nasal tenderness or congestion.      Mouth/Throat:      Mouth: Mucous membranes are moist. No oral lesions.   Eyes:      Extraocular Movements: Extraocular movements intact.      Pupils: Pupils are equal, round, and reactive to light.   Neck:      Thyroid: No thyroid mass or thyromegaly.   Cardiovascular:      Rate and Rhythm: Normal rate and regular rhythm.      Pulses: Normal pulses.      Heart sounds: Normal heart sounds. No murmur heard.  Pulmonary:      Effort: Pulmonary effort is normal.      Breath sounds: Decreased breath sounds present. No wheezing, rhonchi or rales.   Chest:   Breasts:      Right: No axillary adenopathy.       Abdominal:      General: Bowel sounds are normal. There is no distension.      Palpations: Abdomen is soft.      Tenderness: There  "is no abdominal tenderness.   Musculoskeletal:      Cervical back: Neck supple.      Right lower leg: No edema.      Left lower leg: No edema.   Lymphadenopathy:      Cervical: No cervical adenopathy.      Upper Body:      Right upper body: No axillary adenopathy.   Skin:     General: Skin is warm and dry.      Findings: No lesion or rash.   Neurological:      General: No focal deficit present.      Mental Status: She is alert and oriented to person, place, and time.      Cranial Nerves: Cranial nerves are intact.   Psychiatric:         Mood and Affect: Affect normal. Mood is not anxious or depressed.         Vital Signs:   BP 90/60 (BP Location: Right arm, Patient Position: Sitting, Cuff Size: Adult)   Pulse 105   Temp 97.8 °F (36.6 °C) (Temporal)   Resp 19   Ht 167.6 cm (66\")   Wt 83.9 kg (185 lb)   SpO2 92% Comment: room air  BMI 29.86 kg/m²        Result Review :     CMP    CMP 7/6/22 7/7/22 7/14/22   Glucose 103 (A) 103 (A) 126 (A)   BUN 15 15 35 (A)   Creatinine 1.07 (A) 0.97 1.29 (A)   Sodium 133 (A) 135 (A) 133 (A)   Potassium 4.3 4.4 4.6   Chloride 99 101 101   Calcium 10.2 9.8 9.4   Albumin 3.50 3.10 (A) 2.91 (A)   Total Bilirubin 2.5 (A) 2.9 (A) 5.6 (A)   Alkaline Phosphatase 385 (A) 325 (A) 488 (A)   AST (SGOT) 189 (A) 156 (A) 260 (A)   ALT (SGPT) 121 (A) 93 (A) 111 (A)   (A) Abnormal value            CBC w/diff    CBC w/Diff 7/6/22 7/7/22 7/14/22   WBC 16.86 (A) 12.95 (A) 14.43 (A)   RBC 5.56 (A) 4.96 5.13   Hemoglobin 17.0 (A) 15.3 15.8   Hematocrit 50.5 (A) 44.9 47.3 (A)   MCV 90.8 90.5 92.2   MCH 30.6 30.8 30.8   MCHC 33.7 34.1 33.4   RDW 14.7 15.0 18.1 (A)   Platelets 279 216 322   Neutrophil Rel % 76.0 74.9 76.7 (A)   Immature Granulocyte Rel % 0.5 0.3 0.7 (A)   Lymphocyte Rel % 13.6 (A) 15.4 (A) 13.1 (A)   Monocyte Rel % 9.0 8.8 9.1   Eosinophil Rel % 0.6 0.4 0.3   Basophil Rel % 0.3 0.2 0.1   (A) Abnormal value          Personally reviewed tissue pathology exam from 7/8/2022    Data " reviewed: Radiologic studies Chest CT 7/7/2022, head CT 7/7/2022, Consultant notes Dr. Azar consult note 7/8/2022, Recent hospitalization notes Hospital discharge summary 7/8/2022 and Dr. Steiner office note 7/14/2022   Procedures        Assessment and Plan    Diagnoses and all orders for this visit:    1. Malignant neoplasm of hilus of right lung (HCC) (Primary)    2. Bone metastasis (HCC)    3. Dyspnea on exertion    4. Nicotine dependence, cigarettes, uncomplicated    5. Encounter for smoking cessation counseling    6. Fatigue, unspecified type    7.  Smoking cessation counseling provided.  I spent 5 minutes today counseling patient on the risks of smoking, including throat cancer, lung cancer, COPD, heart disease and death.  Also discussed the benefits of quitting.  8.  Follow-up with oncology as scheduled.  9.  Follow-up with our office as needed.    I spent 49 minutes caring for Karen on this date of service. This time includes time spent by me in the following activities:preparing for the visit, reviewing tests, obtaining and/or reviewing a separately obtained history, performing a medically appropriate examination and/or evaluation , counseling and educating the patient/family/caregiver, referring and communicating with other health care professionals  and documenting information in the medical record    Follow Up   Return if symptoms worsen or fail to improve.  Patient was given instructions and counseling regarding her condition or for health maintenance advice. Please see specific information pulled into the AVS if appropriate.

## 2022-07-15 NOTE — TELEPHONE ENCOUNTER
This nurse called the pt back. No one answered. This nurse left a message explaining that the pt's liver will not improve by taking a pill and informed her that she has to have chemo tx to combat the mets in her liver to reduce the enlagrement. This nurse left instructions to take antiemetics for nausea. This nurse informed the pt that a dietician will call her later to discuss her diet to help her with the condition of her liver. This nurse also informed the pt that she needs to keep all of apt's so that she will have her port placed and test complete and be ready start tx when her Chemo gets approved. This nurse left the Triage number to call if she has any further questions.

## 2022-07-15 NOTE — TELEPHONE ENCOUNTER
Patient was to come in yesterday for her appt but Dr. Steiner needed to see her yesterday. She was dx with stage 4 lung cancer. She was told her liver is swollen and needs to be seen by gastro. She said she can't hardly eat anything for the nausea and abd pain. Your next available appt is at the end of Aug. She cannot do This Tues or Wed because she has to be in [place] for the ca.Please advise.

## 2022-07-15 NOTE — PATIENT INSTRUCTIONS
Smoking Tobacco Information, Adult  Smoking tobacco can be harmful to your health. Tobacco contains a poisonous (toxic), colorless chemical called nicotine. Nicotine is addictive. It changes the brain and can make it hard to stop smoking. Tobacco also has other toxic chemicals that can hurt your body and raise your risk of many cancers.  How can smoking tobacco affect me?  Smoking tobacco puts you at risk for:  Cancer. Smoking is most commonly associated with lung cancer, but can also lead to cancer in other parts of the body.  Chronic obstructive pulmonary disease (COPD). This is a long-term lung condition that makes it hard to breathe. It also gets worse over time.  High blood pressure (hypertension), heart disease, stroke, or heart attack.  Lung infections, such as pneumonia.  Cataracts. This is when the lenses in the eyes become clouded.  Digestive problems. This may include peptic ulcers, heartburn, and gastroesophageal reflux disease (GERD).  Oral health problems, such as gum disease and tooth loss.  Loss of taste and smell.  Smoking can affect your appearance by causing:  Wrinkles.  Yellow or stained teeth, fingers, and fingernails.  Smoking tobacco can also affect your social life, because:  It may be challenging to find places to smoke when away from home. Many workplaces, restaurants, hotels, and public places are tobacco-free.  Smoking is expensive. This is due to the cost of tobacco and the long-term costs of treating health problems from smoking.  Secondhand smoke may affect those around you. Secondhand smoke can cause lung cancer, breathing problems, and heart disease. Children of smokers have a higher risk for:  Sudden infant death syndrome (SIDS).  Ear infections.  Lung infections.  If you currently smoke tobacco, quitting now can help you:  Lead a longer and healthier life.  Look, smell, breathe, and feel better over time.  Save money.  Protect others from the harms of secondhand smoke.  What  actions can I take to prevent health problems?  Quit smoking    Do not start smoking. Quit if you already do.  Make a plan to quit smoking and commit to it. Look for programs to help you and ask your health care provider for recommendations and ideas.  Set a date and write down all the reasons you want to quit.  Let your friends and family know you are quitting so they can help and support you. Consider finding friends who also want to quit. It can be easier to quit with someone else, so that you can support each other.  Talk with your health care provider about using nicotine replacement medicines to help you quit, such as gum, lozenges, patches, sprays, or pills.  Do not replace cigarette smoking with electronic cigarettes, which are commonly called e-cigarettes. The safety of e-cigarettes is not known, and some may contain harmful chemicals.  If you try to quit but return to smoking, stay positive. It is common to slip up when you first quit, so take it one day at a time.  Be prepared for cravings. When you feel the urge to smoke, chew gum or suck on hard candy.    Lifestyle  Stay busy and take care of your body.  Drink enough fluid to keep your urine pale yellow.  Get plenty of exercise and eat a healthy diet. This can help prevent weight gain after quitting.  Monitor your eating habits. Quitting smoking can cause you to have a larger appetite than when you smoke.  Find ways to relax. Go out with friends or family to a movie or a restaurant where people do not smoke.  Ask your health care provider about having regular tests (screenings) to check for cancer. This may include blood tests, imaging tests, and other tests.  Find ways to manage your stress, such as meditation, yoga, or exercise.  Where to find support  To get support to quit smoking, consider:  Asking your health care provider for more information and resources.  Taking classes to learn more about quitting smoking.  Looking for local organizations  that offer resources about quitting smoking.  Joining a support group for people who want to quit smoking in your local community.  Calling the smokefree.gov counselor helpline: 1-800-Quit-Now (1-296.920.2879)  Where to find more information  You may find more information about quitting smoking from:  HelpGuide.org: www.helpguide.org  Smokefree.gov: smokefree.gov  American Lung Association: www.lung.org  Contact a health care provider if you:  Have problems breathing.  Notice that your lips, nose, or fingers turn blue.  Have chest pain.  Are coughing up blood.  Feel faint or you pass out.  Have other health changes that cause you to worry.  Summary  Smoking tobacco can negatively affect your health, the health of those around you, your finances, and your social life.  Do not start smoking. Quit if you already do. If you need help quitting, ask your health care provider.  Think about joining a support group for people who want to quit smoking in your local community. There are many effective programs that will help you to quit this behavior.  This information is not intended to replace advice given to you by your health care provider. Make sure you discuss any questions you have with your health care provider.  Document Revised: 09/11/2020 Document Reviewed: 01/02/2018  [employer]vier Patient Education © 2021 [employer]

## 2022-07-15 NOTE — PROGRESS NOTES
Outpatient Nutrition Oncology Assessment    Patient Name: Karen Goldstein  YOB: 1960  MRN: [id number]  Assessment Date: 7/15/2022    CLINICAL NUTRITION ASSESSMENT  Dx: Metastatic Lung CA with mets to liver and bone      Type of Cancer Treatment         CLINICAL NUTRITION ASSESSMENT      Reason for Assessment  Physician consult     H&P:    Past Medical History:   Diagnosis Date   • Arthritis    • Bone metastasis (HCC) 7/14/2022   • Cervicalgia 03/07/2017   • Diabetes mellitus (HCC)    • DM2 (diabetes mellitus, type 2) (HCC)    • Essential hypertension    • Foot pain, left    • Hyperlipidemia    • Hypertension    • Hypertension    • Lumbago 03/07/2017    low back pain   • Lung cancer (HCC)    • Malignant neoplasm of hilus of right lung (HCC) 7/14/2022   • Metastatic cancer (HCC)    • Paresthesia 03/07/2017   • Sciatica 03/07/2017        Current Problems:   Patient Active Problem List   Diagnosis Code   • DM2 (diabetes mellitus, type 2) (HCC) E11.9   • Essential hypertension I10   • Hyperlipidemia E78.5   • Sciatica M54.30   • Arthritis M19.90   • Tobacco abuse Z72.0   • Nicotine dependence, cigarettes, uncomplicated  F17.210   • Left foot pain M79.672   • Telogen effluvium L65.0   • Right upper quadrant abdominal pain R10.11   • Malignant neoplasm of hilus of right lung (HCC) C34.01   • Bone metastasis (HCC) C79.51         Anthropometrics     Row Name 07/15/22 1537          Anthropometrics    Weight for Calculation 83.9 kg (184 lb 15.5 oz)                 BMI kg/m2   There is no height or weight on file to calculate BMI.    Weight Hx  Wt Readings from Last 30 Encounters:   07/15/22 1036 83.9 kg (185 lb)   07/14/22 1010 83.8 kg (184 lb 11.9 oz)   07/08/22 0504 85 kg (187 lb 6.3 oz)   07/06/22 1404 85 kg (187 lb 6.3 oz)   07/06/22 1053 83.9 kg (185 lb)   06/08/22 0912 89.8 kg (198 lb)   05/25/22 1410 89 kg (196 lb 3.2 oz)   03/07/22 0917 92.2 kg (203 lb 3.2 oz)   02/22/22 1036 92.2 kg (203 lb 4.2 oz)    11/08/21 1243 102 kg (225 lb 3.2 oz)   10/15/21 0908 100 kg (220 lb 6.4 oz)   09/02/21 1400 98.3 kg (216 lb 12.8 oz)   07/01/21 1329 101 kg (223 lb)   06/22/21 1137 88.5 kg (195 lb)   01/15/21 0000 96.3 kg (212 lb 6 oz)   10/14/20 0000 97.5 kg (215 lb)   08/03/20 0000 96.7 kg (213 lb 2 oz)   07/29/20 0000 95.7 kg (211 lb)   07/27/20 0000 95.9 kg (211 lb 8 oz)   07/13/20 0000 94 kg (207 lb 2 oz)         Estimated/Assessed Needs - Anthropometrics     Row Name 07/15/22 1537          Anthropometrics    Weight for Calculation 83.9 kg (184 lb 15.5 oz)            Estimated/Assessed Needs    Additional Documentation KCAL/KG (Group);Protein Requirements (Group);Fluid Requirements (Group)            KCAL/KG    KCAL/KG 30 Kcal/Kg (kcal);35 Kcal/Kg (kcal)     30 Kcal/Kg (kcal) 2517     35 Kcal/Kg (kcal) 2936.5            Protein Requirements    Weight Used For Protein Calculations 83.9 kg (184 lb 15.5 oz)     Est Protein Requirement Amount (gms/kg) 1.5 gm protein     Estimated Protein Requirements (gms/day) 125.85                  Labs/Medications        Pertinent Labs Reviewed.   Results from last 7 days   Lab Units 07/14/22  1148   SODIUM mmol/L 133*   POTASSIUM mmol/L 4.6   CHLORIDE mmol/L 101   CO2 mmol/L 19.3*   BUN mg/dL 35*   CREATININE mg/dL 1.29*   CALCIUM mg/dL 9.4   BILIRUBIN mg/dL 5.6*   ALK PHOS U/L 488*   ALT (SGPT) U/L 111*   AST (SGOT) U/L 260*   GLUCOSE mg/dL 126*     Results from last 7 days   Lab Units 07/14/22  1148 07/14/22  1135   PHOSPHORUS mg/dL 3.0  --    HEMOGLOBIN g/dL  --  15.8   HEMATOCRIT %  --  47.3*     COVID19   Date Value Ref Range Status   02/22/2022 Detected (C) Not Detected - Ref. Range Final     Lab Results   Component Value Date    HGBA1C 6.90 (H) 06/08/2022         Pertinent Medications Apple Cider Vinegar, B Complex Vitamins, Biotin, Cholecalciferol, Coenzyme Q10, Zinc, amLODIPine, ascorbic acid, atenolol, cyclobenzaprine, lisinopril, metFORMIN, and prochlorperazine     Physical  "Findings            Current Nutrition Orders & Evaluation of Intake       Oral Nutrition     Current PO Diet Softer foods, more liquid in form   Supplement Ensure varies     Enteral Nutrition     Current Formula    Schedule      Parenteral Nutrition     Current Prescription    Schedule      Nutrition Diagnosis        Nutrition Dx Problem 1 Increased nutrient needs related to increased nutrient needs due to catabolic disease as evidenced by hypermetabolic state.       Nutrition Intervention       RD Action Nutritional Counseling  Written Materials to be mailed: Recipes for smoothies / milkshakes, Nutrition Therapy for Head, Neck, and Lung CA (includes recipes in the back), Gastroparesis MNT (more for small meal ideas and appropriate foods to choose from), Liquid Diet (for more nutrient dense liquid ideas)  Nutrition F/U     Monitor/Evaluation       Monitor Per oncology nutrition protocol.     Comments:  MD Consult received to discuss nutrition ideas with pt and for recent weight loss. Spoke with pt and pt's  via phone. Pt reports she has difficulty consuming solid foods and only able to tolerate Ensure, yogurt, fruit cups due to enlarged liver pressing on stomach. Pt reports she lays on her right side to aid gastric emptying. Per EMR, pt with 9% weight loss X 4 months. Discussed with pt and pt's family MNT to aid gastric emptying. Encouraged pt to consume low fiber, soft and well cooked foods (very tender meat minced), possibly consuming pureed or blenderized foods to aid emptying. Also encouraged avoiding solid fats, however, liquid fats are usually well tolerated (whole milk, high kcal oral nutrition supplements). Encouraged pt consume very small meals every 1-1.5 hours a day, alternating \"solids\" from calorie dense liquids and to continue laying on right side to aid emptying as tolerated. Also discussed different oral nutrition supplement options that are higher kcal / higher protein to help meet " "nutritional needs. Pt reports she has a GI consult \"in 9-10 days\" with hopes in being provided additional aid with GI emptying. Offered to mail handouts that include MNT for slow gastric emptying, milkshake / smoothie recipes, modified texture recipes and supplement information. Pt agreed and verified mailing address. Please see nutrition POC for specific information on how to help meet increased nutritional needs.    Nutrition POC: Low Fiber, Low solid fat diet (not low fat liquids), consisting of small meals, consumed every 1-1.5 hours, alternating with calorie dense oral nutrition supplement consumed in 4-8 ounce servings at a time (depending on tolerance) (for 6-8 total meals a day)     Oral Nutrition Supplement considerations: Ensure Plus, Boost Plus, [place] Instant Breakfast mixed with whole milk, Boost VHC, [facility] 1.5    Recommendations: May consider prokinetic if gastric emptying a concern unless contraindicated.       Electronically signed by:  Saniya Donohue RD  07/15/22 15:38 EDT  "

## 2022-07-15 NOTE — PROGRESS NOTES
Nurse Navigator New Patient Note    Karen Goldstein    07/14/2022      Attended visit with Dr. Krishan Steiner.  Information provided on diagnosis, chemotherapy, and immunotherapy.  Navigator contact information provided. Reviewed Plan of Care.     Discussion:  Services that navigator can assist with.      Brenda Cardoza RN  Nurse Navigator   [facility] [place]

## 2022-07-15 NOTE — TELEPHONE ENCOUNTER
I reviewed her chart and it appears that the cancer treatment is the priority right now.  I would definitely discuss her symptoms with the providers that she's seeing at .  I can see her on 7/21 at 3 pm.

## 2022-07-19 NOTE — TELEPHONE ENCOUNTER
Received call from dr fournier office that patient was a no show for her port consult with their office. I have passed message along to staff.

## 2022-07-20 PROBLEM — A41.9 SEPSIS: Status: ACTIVE | Noted: 2022-01-01

## 2022-07-20 NOTE — TELEPHONE ENCOUNTER
Caller: HOLLY AUSTIN    Relationship: Emergency Contact    Best call back number: [phone]    What was the call regarding: HOLLY CALLED TO THANK DR. SR FOR CONTINUING TO FIGHT FOR [place].    Do you require a callback: YES

## 2022-07-21 PROBLEM — R65.21 SEPTIC SHOCK: Status: ACTIVE | Noted: 2022-01-01

## 2022-07-25 LAB
BACTERIA SPEC AEROBE CULT: NORMAL
BACTERIA SPEC AEROBE CULT: NORMAL

## 2023-03-20 DIAGNOSIS — E11.9 TYPE 2 DIABETES MELLITUS WITHOUT COMPLICATION, WITHOUT LONG-TERM CURRENT USE OF INSULIN: ICD-10-CM

## 2023-03-20 DIAGNOSIS — I10 ESSENTIAL HYPERTENSION: ICD-10-CM

## 2023-03-20 RX ORDER — LISINOPRIL 40 MG/1
TABLET ORAL
Qty: 30 TABLET | Refills: 0 | OUTPATIENT
Start: 2023-03-20

## 2023-03-20 RX ORDER — AMLODIPINE BESYLATE 10 MG/1
TABLET ORAL
Qty: 30 TABLET | Refills: 0 | OUTPATIENT
Start: 2023-03-20

## 2023-03-20 RX ORDER — ATENOLOL 25 MG/1
TABLET ORAL
Qty: 30 TABLET | Refills: 0 | OUTPATIENT
Start: 2023-03-20

## 2023-12-13 NOTE — ASSESSMENT & PLAN NOTE
(R05.1) Acute cough  (primary encounter diagnosis)  Comment:   Plan: XR Chest 2 Views, doxycycline hyclate         (VIBRAMYCIN) 100 MG capsule            (R06.2) Wheezing  Comment:   Plan: albuterol (PROAIR HFA/PROVENTIL HFA/VENTOLIN         HFA) 108 (90 Base) MCG/ACT inhaler, predniSONE         (DELTASONE) 20 MG tablet            Rest.     Follow up with primary doctor should symptoms persist or worsen.         Patient interested in quitting, will start Chantix at this time.  Discussed risk of intrusive thoughts or other side effects when starting medication.  Would like for patient to decrease cigarettes.  Will order low-dose CT of chest for lung cancer screening.